# Patient Record
Sex: FEMALE | Race: BLACK OR AFRICAN AMERICAN | Employment: FULL TIME | URBAN - METROPOLITAN AREA
[De-identification: names, ages, dates, MRNs, and addresses within clinical notes are randomized per-mention and may not be internally consistent; named-entity substitution may affect disease eponyms.]

---

## 2017-10-11 ENCOUNTER — HOSPITAL ENCOUNTER (EMERGENCY)
Age: 57
Discharge: HOME | End: 2017-10-11
Attending: EMERGENCY MEDICINE
Payer: SELF-PAY

## 2017-10-11 VITALS
WEIGHT: 110 LBS | RESPIRATION RATE: 16 BRPM | BODY MASS INDEX: 20.24 KG/M2 | HEART RATE: 79 BPM | HEIGHT: 62 IN | OXYGEN SATURATION: 98 % | SYSTOLIC BLOOD PRESSURE: 119 MMHG | DIASTOLIC BLOOD PRESSURE: 71 MMHG | TEMPERATURE: 97.4 F

## 2017-10-11 DIAGNOSIS — S39.012A STRAIN OF LUMBAR REGION, INITIAL ENCOUNTER: Primary | ICD-10-CM

## 2017-10-11 DIAGNOSIS — M54.16 LUMBAR RADICULAR PAIN: ICD-10-CM

## 2017-10-11 LAB
APPEARANCE UR: CLEAR
BILIRUB UR QL: NEGATIVE
COLOR UR: YELLOW
EPITH CASTS URNS QL MICRO: NORMAL /LPF (ref 0–5)
GLUCOSE UR STRIP.AUTO-MCNC: NEGATIVE MG/DL
HGB UR QL STRIP: NEGATIVE
KETONES UR QL STRIP.AUTO: NEGATIVE MG/DL
LEUKOCYTE ESTERASE UR QL STRIP.AUTO: ABNORMAL
NITRITE UR QL STRIP.AUTO: NEGATIVE
PH UR STRIP: 6.5 [PH] (ref 5–8)
PROT UR STRIP-MCNC: NEGATIVE MG/DL
RBC #/AREA URNS HPF: NORMAL /HPF (ref 0–5)
SP GR UR REFRACTOMETRY: 1.02 (ref 1–1.03)
UROBILINOGEN UR QL STRIP.AUTO: 1 EU/DL (ref 0.2–1)
WBC URNS QL MICRO: NORMAL /HPF (ref 0–4)

## 2017-10-11 PROCEDURE — 99282 EMERGENCY DEPT VISIT SF MDM: CPT

## 2017-10-11 PROCEDURE — 81001 URINALYSIS AUTO W/SCOPE: CPT | Performed by: EMERGENCY MEDICINE

## 2017-10-11 RX ORDER — ACETAMINOPHEN 325 MG/1
650 TABLET ORAL
Qty: 20 TAB | Refills: 0 | Status: SHIPPED | OUTPATIENT
Start: 2017-10-11 | End: 2022-07-25

## 2017-10-11 RX ORDER — PREDNISONE 10 MG/1
TABLET ORAL
Qty: 1 PACKAGE | Refills: 0 | OUTPATIENT
Start: 2017-10-11 | End: 2019-11-16

## 2017-10-11 RX ORDER — CYCLOBENZAPRINE HCL 10 MG
10 TABLET ORAL
Qty: 15 TAB | Refills: 0 | OUTPATIENT
Start: 2017-10-11 | End: 2020-01-01

## 2017-10-11 NOTE — ED TRIAGE NOTES
Patient complains of lower back pain x 3 days. Patient denies any trauma. Patient believes it may be an UTI.

## 2017-10-11 NOTE — DISCHARGE INSTRUCTIONS
Back Strain: Care Instructions  Your Care Instructions    Back strain happens when you overstretch, or pull, a muscle in your back. You may hurt your back in an accident or when you exercise or lift something. Most back pain will get better with rest and time. You can take care of yourself at home to help your back heal.  Follow-up care is a key part of your treatment and safety. Be sure to make and go to all appointments, and call your doctor if you are having problems. It's also a good idea to know your test results and keep a list of the medicines you take. How can you care for yourself at home? · Try to stay as active as you can, but stop or reduce any activity that causes pain. · Put ice or a cold pack on the sore muscle for 10 to 20 minutes at a time to stop swelling. Try this every 1 to 2 hours for 3 days (when you are awake) or until the swelling goes down. Put a thin cloth between the ice pack and your skin. · After 2 or 3 days, apply a heating pad on low or a warm cloth to your back. Some doctors suggest that you go back and forth between hot and cold treatments. · Take pain medicines exactly as directed. ¨ If the doctor gave you a prescription medicine for pain, take it as prescribed. ¨ If you are not taking a prescription pain medicine, ask your doctor if you can take an over-the-counter medicine. · Try sleeping on your side with a pillow between your legs. Or put a pillow under your knees when you lie on your back. These measures can ease pain in your lower back. · Return to your usual level of activity slowly. When should you call for help? Call 911 anytime you think you may need emergency care. For example, call if:  · You are unable to move a leg at all. Call your doctor now or seek immediate medical care if:  · You have new or worse symptoms in your legs, belly, or buttocks. Symptoms may include:  ¨ Numbness or tingling. ¨ Weakness. ¨ Pain.   · You lose bladder or bowel control. Watch closely for changes in your health, and be sure to contact your doctor if you are not getting better as expected. Where can you learn more? Go to http://ej-damari.info/. Enter F309 in the search box to learn more about \"Back Strain: Care Instructions. \"  Current as of: March 21, 2017  Content Version: 11.3  © 7620-0963 [employer]. Care instructions adapted under license by [employer] (which disclaims liability or warranty for this information). If you have questions about a medical condition or this instruction, always ask your healthcare professional. [place] [address] any warranty or liability for your use of this information.

## 2017-10-11 NOTE — ED PROVIDER NOTES
HPI Comments: 5:19 PM Oskar Trevino is a 59 y.o. female who presents to the ED c/o lower back swelling and pain that began 3 days ago that radiates to hip and is worse on the right side. Patient states that she does a lot of walking at work. She denies dysuria, but notes a changed odor. She states that she has not had appetite changes. She is concerned about a UTI. The history is provided by the patient. No  was used. No past medical history on file. Past Surgical History:   Procedure Laterality Date    HX PARTIAL HYSTERECTOMY           No family history on file. Social History     Social History    Marital status: SINGLE     Spouse name: N/A    Number of children: N/A    Years of education: N/A     Occupational History    Not on file. Social History Main Topics    Smoking status: Current Every Day Smoker     Packs/day: 0.50    Smokeless tobacco: Not on file    Alcohol use No    Drug use: No    Sexual activity: Yes     Birth control/ protection: None     Other Topics Concern    Not on file     Social History Narrative    No narrative on file         ALLERGIES: Review of patient's allergies indicates no known allergies. Review of Systems   Constitutional: Negative for activity change, fatigue and fever. HENT: Negative for congestion and rhinorrhea. Eyes: Negative for visual disturbance. Respiratory: Negative for shortness of breath. Cardiovascular: Negative for chest pain and palpitations. Gastrointestinal: Negative for abdominal pain, diarrhea, nausea and vomiting. Genitourinary: Positive for flank pain. Negative for dysuria and hematuria. Musculoskeletal: Positive for back pain, joint swelling and myalgias. Skin: Negative for rash. Neurological: Negative for dizziness, weakness and light-headedness.        Vitals:    10/11/17 1705   BP: 119/71   Pulse: 79   Resp: 16   Temp: 97.4 °F (36.3 °C)   SpO2: 98%   Weight: 49.9 kg (110 lb) Height: 5' 2\" (1.575 m)            Physical Exam   Constitutional: She is oriented to person, place, and time. She appears well-developed and well-nourished. No distress. HENT:   Head: Normocephalic and atraumatic. Right Ear: External ear normal.   Left Ear: External ear normal.   Nose: Nose normal.   Mouth/Throat: Oropharynx is clear and moist.   Eyes: Conjunctivae and EOM are normal. Pupils are equal, round, and reactive to light. No scleral icterus. Neck: Normal range of motion. Neck supple. No JVD present. No tracheal deviation present. No thyromegaly present. Cardiovascular: Normal rate, regular rhythm, normal heart sounds and intact distal pulses. Exam reveals no gallop and no friction rub. No murmur heard. Pulmonary/Chest: Effort normal and breath sounds normal. She exhibits no tenderness. Abdominal: Soft. Bowel sounds are normal. She exhibits no distension. There is no tenderness. There is no rebound and no guarding. Mild R flank pain    Musculoskeletal: Normal range of motion. She exhibits tenderness. She exhibits no edema. R paraspinal tenderness, muscle hypertonicity, pain noted to the low back R with straight leg raising, no radicular findings   Lymphadenopathy:     She has no cervical adenopathy. Neurological: She is alert and oriented to person, place, and time. No cranial nerve deficit. Coordination normal.   No sensory loss, Gait normal, Motor 5/5   Skin: Skin is warm and dry. Psychiatric: She has a normal mood and affect. Her behavior is normal. Judgment and thought content normal.   Nursing note and vitals reviewed. MDM  Number of Diagnoses or Management Options  Diagnosis management comments: Pt is a 63yo female without significant medical hx presents with complaint of R sided low back pain with movement. Pt denies fever or chills but a change in urine smell. Suspect lumbar strain but will follow UA and reevaluate.   If negative, will proceed with supportive care for her low back pain. Jennifer William, DO 5:30 PM      ED Course       Procedures    Vitals:  Patient Vitals for the past 12 hrs:   Temp Pulse Resp BP SpO2   10/11/17 1705 97.4 °F (36.3 °C) 79 16 119/71 98 %       Medications ordered:   Medications - No data to display      Lab findings:  Recent Results (from the past 12 hour(s))   URINALYSIS W/ RFLX MICROSCOPIC    Collection Time: 10/11/17  5:07 PM   Result Value Ref Range    Color YELLOW      Appearance CLEAR      Specific gravity 1.018 1.005 - 1.030      pH (UA) 6.5 5.0 - 8.0      Protein NEGATIVE  NEG mg/dL    Glucose NEGATIVE  NEG mg/dL    Ketone NEGATIVE  NEG mg/dL    Bilirubin NEGATIVE  NEG      Blood NEGATIVE  NEG      Urobilinogen 1.0 0.2 - 1.0 EU/dL    Nitrites NEGATIVE  NEG      Leukocyte Esterase SMALL (A) NEG     URINE MICROSCOPIC ONLY    Collection Time: 10/11/17  5:07 PM   Result Value Ref Range    WBC 0 to 2 0 - 4 /hpf    RBC 0 to 2 0 - 5 /hpf    Epithelial cells FEW 0 - 5 /lpf       Reevaluation of patient:   I have reviewed discharge instructions with the patient. The patient verbalized understanding. Disposition:  Diagnosis:   1. Strain of lumbar region, initial encounter    2. Lumbar radicular pain        Disposition: Discharge    Follow-up Information     [facility] Details [facility] Call in 2 days ED visit follow-up [address], [address] [facility]. [address]    [facility] EMERGENCY DEPT Go to As needed, If symptoms worsen [address] [place] [id number]  [phone]           Patient's Medications   Start Taking    ACETAMINOPHEN (TYLENOL) 325 MG TABLET    Take 2 Tabs by mouth every four (4) hours as needed for Pain. CYCLOBENZAPRINE (FLEXERIL) 10 MG TABLET    Take 1 Tab by mouth three (3) times daily as needed for Muscle Spasm(s).     PREDNISONE (STERAPRED DS) 10 MG DOSE PACK    6 day dose pack per package instructions   Continue Taking    No medications on file   These Medications have changed    No medications on file   Stop Taking    No medications on file         Via Cheri Deanadebayo Michael acting as a scribe for and in the presence of Alex Mariano MD      October 11, 2017 at 5:21 PM       Provider Attestation:      I personally performed the services described in the documentation, reviewed the documentation, as recorded by the scribe in my presence, and it accurately and completely records my words and actions.  October 11, 2017 at 5:21 PM - Alex Mariano MD

## 2017-10-11 NOTE — ED NOTES
I have reviewed discharge instructions with the patient. The patient verbalized understanding.     Pt evaluated by provider

## 2018-06-26 ENCOUNTER — HOSPITAL ENCOUNTER (OUTPATIENT)
Dept: LAB | Age: 58
Discharge: HOME | End: 2018-06-26
Payer: SUBSIDIZED

## 2018-06-26 LAB
ALBUMIN SERPL-MCNC: 4 G/DL (ref 3.4–5)
ALBUMIN/GLOB SERPL: 1.1 {RATIO} (ref 0.8–1.7)
ALP SERPL-CCNC: 66 U/L (ref 45–117)
ALT SERPL-CCNC: 21 U/L (ref 13–56)
ANION GAP SERPL CALC-SCNC: 4 MMOL/L (ref 3–18)
AST SERPL-CCNC: 20 U/L (ref 15–37)
BASOPHILS # BLD: 0 K/UL (ref 0–0.06)
BASOPHILS NFR BLD: 0 % (ref 0–2)
BILIRUB SERPL-MCNC: 0.7 MG/DL (ref 0.2–1)
BUN SERPL-MCNC: 10 MG/DL (ref 7–18)
BUN/CREAT SERPL: 16 (ref 12–20)
CALCIUM SERPL-MCNC: 9.3 MG/DL (ref 8.5–10.1)
CHLORIDE SERPL-SCNC: 107 MMOL/L (ref 100–108)
CO2 SERPL-SCNC: 29 MMOL/L (ref 21–32)
CREAT SERPL-MCNC: 0.63 MG/DL (ref 0.6–1.3)
DIFFERENTIAL METHOD BLD: ABNORMAL
EOSINOPHIL # BLD: 0.1 K/UL (ref 0–0.4)
EOSINOPHIL NFR BLD: 1 % (ref 0–5)
ERYTHROCYTE [DISTWIDTH] IN BLOOD BY AUTOMATED COUNT: 12.4 % (ref 11.6–14.5)
GLOBULIN SER CALC-MCNC: 3.6 G/DL (ref 2–4)
GLUCOSE SERPL-MCNC: 86 MG/DL (ref 74–99)
HCT VFR BLD AUTO: 37.8 % (ref 35–45)
HGB BLD-MCNC: 12.9 G/DL (ref 12–16)
LYMPHOCYTES # BLD: 2 K/UL (ref 0.9–3.6)
LYMPHOCYTES NFR BLD: 22 % (ref 21–52)
MCH RBC QN AUTO: 30.9 PG (ref 24–34)
MCHC RBC AUTO-ENTMCNC: 34.1 G/DL (ref 31–37)
MCV RBC AUTO: 90.6 FL (ref 74–97)
MONOCYTES # BLD: 0.9 K/UL (ref 0.05–1.2)
MONOCYTES NFR BLD: 10 % (ref 3–10)
NEUTS SEG # BLD: 5.9 K/UL (ref 1.8–8)
NEUTS SEG NFR BLD: 67 % (ref 40–73)
PLATELET # BLD AUTO: 239 K/UL (ref 135–420)
PMV BLD AUTO: 10.7 FL (ref 9.2–11.8)
POTASSIUM SERPL-SCNC: 4.5 MMOL/L (ref 3.5–5.5)
PROT SERPL-MCNC: 7.6 G/DL (ref 6.4–8.2)
RBC # BLD AUTO: 4.17 M/UL (ref 4.2–5.3)
SODIUM SERPL-SCNC: 140 MMOL/L (ref 136–145)
WBC # BLD AUTO: 8.8 K/UL (ref 4.6–13.2)

## 2018-06-26 PROCEDURE — 85025 COMPLETE CBC W/AUTO DIFF WBC: CPT | Performed by: INTERNAL MEDICINE

## 2018-06-26 PROCEDURE — 80053 COMPREHEN METABOLIC PANEL: CPT | Performed by: INTERNAL MEDICINE

## 2018-06-26 PROCEDURE — 87902 NFCT AGT GNTYP ALYS HEP C: CPT | Performed by: INTERNAL MEDICINE

## 2018-06-26 PROCEDURE — 36415 COLL VENOUS BLD VENIPUNCTURE: CPT | Performed by: INTERNAL MEDICINE

## 2018-06-29 LAB
HCV GENTYP SERPL NAA+PROBE: NORMAL
HCV GENTYP SERPL NAA+PROBE: NORMAL
PLEASE NOTE, 550474: NORMAL
PLEASE NOTE, 550474: NORMAL

## 2018-08-14 ENCOUNTER — HOSPITAL ENCOUNTER (OUTPATIENT)
Dept: LAB | Age: 58
Discharge: HOME | End: 2018-08-14
Payer: SUBSIDIZED

## 2018-08-14 LAB
ALBUMIN SERPL-MCNC: 4 G/DL (ref 3.4–5)
ALBUMIN/GLOB SERPL: 1.1 {RATIO} (ref 0.8–1.7)
ALP SERPL-CCNC: 68 U/L (ref 45–117)
ALT SERPL-CCNC: 22 U/L (ref 13–56)
AMPHET UR QL SCN: NEGATIVE
ANION GAP SERPL CALC-SCNC: 4 MMOL/L (ref 3–18)
APTT PPP: 34.3 SEC (ref 23–36.4)
AST SERPL-CCNC: 23 U/L (ref 15–37)
BARBITURATES UR QL SCN: NEGATIVE
BASOPHILS # BLD: 0 K/UL (ref 0–0.1)
BASOPHILS NFR BLD: 0 % (ref 0–2)
BENZODIAZ UR QL: NEGATIVE
BILIRUB SERPL-MCNC: 0.4 MG/DL (ref 0.2–1)
BUN SERPL-MCNC: 9 MG/DL (ref 7–18)
BUN/CREAT SERPL: 16 (ref 12–20)
CALCIUM SERPL-MCNC: 9.5 MG/DL (ref 8.5–10.1)
CANNABINOIDS UR QL SCN: NEGATIVE
CHLORIDE SERPL-SCNC: 107 MMOL/L (ref 100–108)
CO2 SERPL-SCNC: 31 MMOL/L (ref 21–32)
COCAINE UR QL SCN: NEGATIVE
CREAT SERPL-MCNC: 0.57 MG/DL (ref 0.6–1.3)
DIFFERENTIAL METHOD BLD: ABNORMAL
EOSINOPHIL # BLD: 0.1 K/UL (ref 0–0.4)
EOSINOPHIL NFR BLD: 2 % (ref 0–5)
ERYTHROCYTE [DISTWIDTH] IN BLOOD BY AUTOMATED COUNT: 12.6 % (ref 11.6–14.5)
GLOBULIN SER CALC-MCNC: 3.6 G/DL (ref 2–4)
GLUCOSE SERPL-MCNC: 76 MG/DL (ref 74–99)
HAV IGM SER QL: NEGATIVE
HBV CORE IGM SER QL: NEGATIVE
HBV SURFACE AG SER QL: <0.1 INDEX
HBV SURFACE AG SER QL: NEGATIVE
HCT VFR BLD AUTO: 40.1 % (ref 35–45)
HCV AB SER IA-ACNC: >11 INDEX
HCV AB SERPL QL IA: POSITIVE
HCV COMMENT,HCGAC: ABNORMAL
HDSCOM,HDSCOM: NORMAL
HGB BLD-MCNC: 13.7 G/DL (ref 12–16)
INR PPP: 0.9 (ref 0.8–1.2)
LYMPHOCYTES # BLD: 2.3 K/UL (ref 0.9–3.6)
LYMPHOCYTES NFR BLD: 33 % (ref 21–52)
MCH RBC QN AUTO: 31.1 PG (ref 24–34)
MCHC RBC AUTO-ENTMCNC: 34.2 G/DL (ref 31–37)
MCV RBC AUTO: 91.1 FL (ref 74–97)
METHADONE UR QL: NEGATIVE
MONOCYTES # BLD: 0.8 K/UL (ref 0.05–1.2)
MONOCYTES NFR BLD: 12 % (ref 3–10)
NEUTS SEG # BLD: 3.6 K/UL (ref 1.8–8)
NEUTS SEG NFR BLD: 53 % (ref 40–73)
OPIATES UR QL: NEGATIVE
PCP UR QL: NEGATIVE
PLATELET # BLD AUTO: 254 K/UL (ref 135–420)
PMV BLD AUTO: 10.6 FL (ref 9.2–11.8)
POTASSIUM SERPL-SCNC: 4.7 MMOL/L (ref 3.5–5.5)
PROT SERPL-MCNC: 7.6 G/DL (ref 6.4–8.2)
PROTHROMBIN TIME: 12.3 SEC (ref 11.5–15.2)
RBC # BLD AUTO: 4.4 M/UL (ref 4.2–5.3)
SODIUM SERPL-SCNC: 142 MMOL/L (ref 136–145)
SP1: ABNORMAL
SP2: ABNORMAL
SP3: ABNORMAL
WBC # BLD AUTO: 6.8 K/UL (ref 4.6–13.2)

## 2018-08-14 PROCEDURE — 80307 DRUG TEST PRSMV CHEM ANLYZR: CPT | Performed by: INTERNAL MEDICINE

## 2018-08-14 PROCEDURE — 85025 COMPLETE CBC W/AUTO DIFF WBC: CPT | Performed by: INTERNAL MEDICINE

## 2018-08-14 PROCEDURE — 86704 HEP B CORE ANTIBODY TOTAL: CPT | Performed by: INTERNAL MEDICINE

## 2018-08-14 PROCEDURE — 80074 ACUTE HEPATITIS PANEL: CPT | Performed by: INTERNAL MEDICINE

## 2018-08-14 PROCEDURE — 87389 HIV-1 AG W/HIV-1&-2 AB AG IA: CPT | Performed by: INTERNAL MEDICINE

## 2018-08-14 PROCEDURE — 85730 THROMBOPLASTIN TIME PARTIAL: CPT | Performed by: INTERNAL MEDICINE

## 2018-08-14 PROCEDURE — 36415 COLL VENOUS BLD VENIPUNCTURE: CPT | Performed by: INTERNAL MEDICINE

## 2018-08-14 PROCEDURE — 87522 HEPATITIS C REVRS TRNSCRPJ: CPT | Performed by: INTERNAL MEDICINE

## 2018-08-14 PROCEDURE — 80053 COMPREHEN METABOLIC PANEL: CPT | Performed by: INTERNAL MEDICINE

## 2018-08-14 PROCEDURE — 85610 PROTHROMBIN TIME: CPT | Performed by: INTERNAL MEDICINE

## 2018-08-15 LAB
HBV CORE AB SERPL QL IA: POSITIVE
HIV 1+2 AB+HIV1 P24 AG SERPL QL IA: NONREACTIVE
HIV12 RESULT COMMENT, HHIVC: NORMAL

## 2018-08-17 LAB
HCV RNA SERPL NAA+PROBE-LOG IU: 6.8 HCV LOG 10IU/ML
HCV RNA SERPL PROBE AMP-ACNC: ABNORMAL HCVIU/ML

## 2019-03-14 ENCOUNTER — APPOINTMENT (OUTPATIENT)
Dept: GENERAL RADIOLOGY | Age: 59
End: 2019-03-14
Attending: PHYSICIAN ASSISTANT
Payer: SUBSIDIZED

## 2019-03-14 ENCOUNTER — HOSPITAL ENCOUNTER (EMERGENCY)
Age: 59
Discharge: HOME | End: 2019-03-14
Attending: EMERGENCY MEDICINE
Payer: SUBSIDIZED

## 2019-03-14 VITALS
SYSTOLIC BLOOD PRESSURE: 147 MMHG | HEART RATE: 88 BPM | RESPIRATION RATE: 16 BRPM | OXYGEN SATURATION: 99 % | DIASTOLIC BLOOD PRESSURE: 73 MMHG | TEMPERATURE: 98.6 F

## 2019-03-14 DIAGNOSIS — S16.1XXA STRAIN OF NECK MUSCLE, INITIAL ENCOUNTER: Primary | ICD-10-CM

## 2019-03-14 PROCEDURE — 74011636637 HC RX REV CODE- 636/637: Performed by: PHYSICIAN ASSISTANT

## 2019-03-14 PROCEDURE — 96372 THER/PROPH/DIAG INJ SC/IM: CPT

## 2019-03-14 PROCEDURE — 74011250636 HC RX REV CODE- 250/636: Performed by: PHYSICIAN ASSISTANT

## 2019-03-14 PROCEDURE — 99283 EMERGENCY DEPT VISIT LOW MDM: CPT

## 2019-03-14 PROCEDURE — 72050 X-RAY EXAM NECK SPINE 4/5VWS: CPT

## 2019-03-14 RX ORDER — CYCLOBENZAPRINE HCL 10 MG
10 TABLET ORAL
Qty: 12 TAB | Refills: 0 | Status: SHIPPED | OUTPATIENT
Start: 2019-03-14 | End: 2020-01-01

## 2019-03-14 RX ORDER — PREDNISONE 20 MG/1
60 TABLET ORAL
Status: COMPLETED | OUTPATIENT
Start: 2019-03-14 | End: 2019-03-14

## 2019-03-14 RX ORDER — KETOROLAC TROMETHAMINE 30 MG/ML
30 INJECTION, SOLUTION INTRAMUSCULAR; INTRAVENOUS
Status: COMPLETED | OUTPATIENT
Start: 2019-03-14 | End: 2019-03-14

## 2019-03-14 RX ADMIN — KETOROLAC TROMETHAMINE 30 MG: 30 INJECTION, SOLUTION INTRAMUSCULAR; INTRAVENOUS at 17:52

## 2019-03-14 RX ADMIN — PREDNISONE 60 MG: 20 TABLET ORAL at 17:51

## 2019-03-14 NOTE — ED TRIAGE NOTES
Pt injured neck by hitting pipe while wearing hard hat, jerking neck on Tuesday. Tylenol at home has not helped.

## 2019-03-14 NOTE — ED PROVIDER NOTES
EMERGENCY DEPARTMENT HISTORY AND PHYSICAL EXAM    6:20 PM      Date: 3/14/2019  Patient Name: Nicanor Ardon    History of Presenting Illness     Chief Complaint   Patient presents with    Neck Pain         History Provided By: Patient    Chief Complaint: Neck pain  Duration: 2 Days  Timing:  Worsening  Location: Diffuse  Quality: Not obtained  Severity: Moderate  Modifying Factors: Tylenol did not provide pain relief. Associated Symptoms: denies any other associated signs or symptoms      Additional History (Context): 6:25 PM Nicanor Ardon is a 59 y.o. female with no pertinent PMHx who presents to ED complaining of worsening, moderate, diffuse neck pain onset 2 days. The patient reports hitting her head at work on Tuesday. She states that she struck a pipe while wearing a hardhat, which caused her neck to jerk backwards. The patient notes that the pain did not start instantly, but began later that evening. She states that she also may have slept in an unusual position. The patient claims that she took two Tylenol, which did not provide relief. She reports that she is unable to turn her neck, as it worsens the pain. No other concerns or symptoms at this time. PCP: Sandi Kat MD      Current Outpatient Medications   Medication Sig Dispense Refill    cyclobenzaprine (FLEXERIL) 10 mg tablet Take 1 Tab by mouth three (3) times daily as needed for Muscle Spasm(s). 12 Tab 0    predniSONE (STERAPRED DS) 10 mg dose pack 6 day dose pack per package instructions 1 Package 0    cyclobenzaprine (FLEXERIL) 10 mg tablet Take 1 Tab by mouth three (3) times daily as needed for Muscle Spasm(s). 15 Tab 0    acetaminophen (TYLENOL) 325 mg tablet Take 2 Tabs by mouth every four (4) hours as needed for Pain. 20 Tab 0       Past History     Past Medical History:  No past medical history on file.     Past Surgical History:  Past Surgical History:   Procedure Laterality Date    HX PARTIAL HYSTERECTOMY Family History:  No family history on file. Social History:  Social History     Tobacco Use    Smoking status: Current Every Day Smoker     Packs/day: 0.50   Substance Use Topics    Alcohol use: No    Drug use: No       Allergies:  No Known Allergies      Review of Systems       Review of Systems   Constitutional: Negative for chills and fever. HENT: Negative for congestion, rhinorrhea and sore throat. Eyes: Negative for visual disturbance. Respiratory: Negative for cough and shortness of breath. Cardiovascular: Negative for chest pain and palpitations. Gastrointestinal: Negative for abdominal pain, nausea and vomiting. Musculoskeletal: Positive for neck pain. Negative for back pain and myalgias. Skin: Negative. Allergic/Immunologic: Negative. Neurological: Negative for headaches. All other systems reviewed and are negative. Physical Exam     Visit Vitals  /73 (BP 1 Location: Left arm, BP Patient Position: At rest)   Pulse 88   Temp 98.6 °F (37 °C)   Resp 16   SpO2 99%     Physical Exam   Constitutional: She is oriented to person, place, and time. She appears well-developed and well-nourished. No distress. HENT:   Head: Normocephalic and atraumatic. Eyes: Conjunctivae are normal. Right eye exhibits no discharge. Left eye exhibits no discharge. Neck: Normal range of motion. Neck supple. No thyromegaly present. Cardiovascular: Normal rate, regular rhythm and normal heart sounds. Pulmonary/Chest: Effort normal and breath sounds normal. No respiratory distress. She has no wheezes. She has no rales. She exhibits no tenderness. Musculoskeletal: She exhibits tenderness. She exhibits no edema or deformity. TTP noted to bilateral cervical paraspinal muscles.   No obvious deformity, step-off deformity, midline spinal tenderness, edema, ecchymosis, erythema, or overlying skin changes noted on exam.  Pt is ambulatory, moving BUE with 5/5 strength and FROM against resistance in flexion and extension. Pt is neurovascularly intact distally with cap refill < 3 seconds and intact sensation. Lymphadenopathy:     She has no cervical adenopathy. Neurological: She is alert and oriented to person, place, and time. She has normal reflexes. No cranial nerve deficit. Skin: Skin is warm and dry. She is not diaphoretic. Psychiatric: She has a normal mood and affect. Nursing note and vitals reviewed. Diagnostic Study Results     Labs -  No results found for this or any previous visit (from the past 12 hour(s)). Radiologic Studies -   Xr Spine Cerv 4 Or 5 V    Result Date: 3/14/2019  EXAM: Cervical spine x-ray INDICATION: Neck pain TECHNIQUE: AP, lateral, bilateral oblique and odontoid views of the cervical spine. COMPARISON: None. FINDINGS: The bone density is grossly unremarkable. The cervical spine from the craniocervical junction through T2 is well visualized on the lateral view. The facets are appropriately aligned. No prevertebral edema appreciated. The lateral masses of C1 sit appropriately on C2. No evidence for an acute fracture or dislocation. Multilevel degenerative disc disease is noted. This most pronounced at C5-C6 and C6-C7 where there is moderate disc space height loss with endplate cirrhosis and osteophyte formation. IMPRESSION: 1. Moderate multilevel degenerative disc disease most pronounced at C5-C6 and C6-C7. Medical Decision Making   I am the first provider for this patient. I reviewed the vital signs, available nursing notes, past medical history, past surgical history, family history and social history. Vital Signs-Reviewed the patient's vital signs.     Pulse Oximetry Analysis -  99% on room air (Interpretation) WNL    Cardiac Monitor:  Rate: 88  Rhythm:  Normal Sinus Rhythm     Records Reviewed: Nursing Notes (Time of Review: 6:20 PM)    ED Course: Progress Notes, Reevaluation, and Consults:      Provider Notes (Medical Decision Making):   Differential Diagnosis: Musculoskeletal pain, myofascial strain/sprain, muscle spasm, spondylolisthesis, spondylosis, torticollis, DJD, OA    Plan:  Pt presents ambulatory, moving BUE in NAD, well-hydrated, non-toxic in appearance, with normal vitals. No red flags such as weakness, breathing difficulty, expanding hematomas, neck swelling, crepitance, IVDA, or fever. XR without acute process, pending radiology. Exam and HPI consistent with cervical strain. Will trial outpatient flexeril. Explained to patient that this pain may take a few weeks to completely resolve. Will provide work note as necessary. At this time, patient is stable and appropriate for discharge home. Patient demonstrates understanding of current diagnoses and is in agreement with the treatment plan. They are advised that while the likelihood of serious underlying condition is low at this point given the evaluation performed today, we cannot fully rule it out. They are advised to immediately return with any new symptoms or worsening of current condition. All questions have been answered. Patient is given educational material regarding their diagnoses, including danger symptoms and when to return to the ED. Diagnosis     Clinical Impression:   1. Strain of neck muscle, initial encounter        Disposition: DC Home    Follow-up Information     Follow up With Specialties Details Why Contact Info    Evetta Sacks, MD Internal Medicine Call in 2 days As needed, If symptoms worsen [address] [place] [facility] EMERGENCY DEPT Emergency Medicine Go to As needed, If symptoms worsen [address] [address]  [phone]              Medication List      CHANGE how you take these medications    * cyclobenzaprine 10 mg tablet  Commonly known as:  FLEXERIL  Take 1 Tab by mouth three (3) times daily as needed for Muscle Spasm(s).   What changed:  Another medication with the same name was added. Make sure you understand how and when to take each. * cyclobenzaprine 10 mg tablet  Commonly known as:  FLEXERIL  Take 1 Tab by mouth three (3) times daily as needed for Muscle Spasm(s). What changed: You were already taking a medication with the same name, and this prescription was added. Make sure you understand how and when to take each. * This list has 2 medication(s) that are the same as other medications prescribed for you. Read the directions carefully, and ask your doctor or other care provider to review them with you. CONTINUE taking these medications    acetaminophen 325 mg tablet  Commonly known as:  TYLENOL  Take 2 Tabs by mouth every four (4) hours as needed for Pain. predniSONE 10 mg dose pack  Commonly known as:  STERAPRED DS  6 day dose pack per package instructions           Where to Get Your Medications      Information about where to get these medications is not yet available    Ask your nurse or doctor about these medications  · cyclobenzaprine 10 mg tablet       _______________________________    Samira Gates acting as a scribe for and in the presence of Raza Roche     March 14, 2019 at 6:22 PM       Provider Attestation:      I personally performed the services described in the documentation, reviewed the documentation, as recorded by the scribe in my presence, and it accurately and completely records my words and actions.  March 14, 2019 at 6:22 PM - Sunny Baptiste PA-C

## 2019-03-14 NOTE — LETTER
NOTIFICATION OF RETURN TO WORK 
 
3/14/2019 6:44 PM 
 
Ms. Consuelo Melendez [address] [facility] [address] [place] To Whom It May Concern: 
 
Consuelo Melendez was under the care of [facility] EMERGENCY DEPT. She will be able to return to work on Monday, 3/18/19. If there are questions or concerns please have the patient contact our office. Sincerely, Velvet Vasquez PA-C

## 2019-03-14 NOTE — DISCHARGE INSTRUCTIONS
Patient Education      Please return immediately to the Emergency Room for re-evaluation if you are not improving, develop any new symptoms, or develop worsening of current symptoms! If you have been prescribed a medication and are unable to take this medication for any reason, please return to the Emergency Department for further evaluation! If you have been referred for follow-up to a specialist, but are unable to follow-up and your symptoms are either not improving or are worsening, please return to the Emergency Department for further evaluation! Neck Strain or Sprain: Rehab Exercises  Your Care Instructions  Here are some examples of typical rehabilitation exercises for your condition. Start each exercise slowly. Ease off the exercise if you start to have pain. Your doctor or physical therapist will tell you when you can start these exercises and which ones will work best for you. How to do the exercises  Neck rotation    1. Sit in a firm chair, or stand up straight. 2. Keeping your chin level, turn your head to the right, and hold for 15 to 30 seconds. 3. Turn your head to the left and hold for 15 to 30 seconds. 4. Repeat 2 to 4 times to each side. Neck stretches    1. Look straight ahead, and tip your right ear to your right shoulder. Do not let your left shoulder rise up as you tip your head to the right. 2. Hold for 15 to 30 seconds. 3. Tilt your head to the left. Do not let your right shoulder rise up as you tip your head to the left. 4. Hold for 15 to 30 seconds. 5. Repeat 2 to 4 times to each side. Forward neck flexion    1. Sit in a firm chair, or stand up straight. 2. Bend your head forward. 3. Hold for 15 to 30 seconds. 4. Repeat 2 to 4 times. Lateral (side) bend strengthening    1. With your right hand, place your first two fingers on your right temple.   2. Start to bend your head to the side while using gentle pressure from your fingers to keep your head from bending. 3. Hold for about 6 seconds. 4. Repeat 8 to 12 times. 5. Switch hands and repeat the same exercise on your left side. Forward bend strengthening    1. Place your first two fingers of either hand on your forehead. 2. Start to bend your head forward while using gentle pressure from your fingers to keep your head from bending. 3. Hold for about 6 seconds. 4. Repeat 8 to 12 times. Neutral position strengthening    1. Using one hand, place your fingertips on the back of your head at the top of your neck. 2. Start to bend your head backward while using gentle pressure from your fingers to keep your head from bending. 3. Hold for about 6 seconds. 4. Repeat 8 to 12 times. Chin tuck    1. Lie on the floor with a rolled-up towel under your neck. Your head should be touching the floor. 2. Slowly bring your chin toward your chest.  3. Hold for a count of 6, and then relax for up to 10 seconds. 4. Repeat 8 to 12 times. Follow-up care is a key part of your treatment and safety. Be sure to make and go to all appointments, and call your doctor if you are having problems. It's also a good idea to know your test results and keep a list of the medicines you take. Where can you learn more? Go to http://ej-damari.info/. Enter M679 in the search box to learn more about \"Neck Strain or Sprain: Rehab Exercises. \"  Current as of: September 20, 2018  Content Version: 11.9  © 3561-8928 [employer]. Care instructions adapted under license by [employer] (which disclaims liability or warranty for this information). If you have questions about a medical condition or this instruction, always ask your healthcare professional. [address] any warranty or liability for your use of this information.

## 2019-03-14 NOTE — ED NOTES
Pt discharged to home, discharge paperwork, follow up information, and prescriptions given to pt and all questions answered.

## 2019-03-14 NOTE — ED NOTES
I performed a brief evaluation, including history and physical, of the patient here in triage and I have determined that pt will need further treatment and evaluation from the main side ER physician. I have placed initial orders to help in expediting patients care. March 14, 2019 at 5:30 PM - GREER Ernandez        Visit Vitals  /73 (BP 1 Location: Left arm, BP Patient Position: At rest)   Pulse 88   Temp 98.6 °F (37 °C)   Resp 16   SpO2 99%        Patient reports neck pain with motion, reports neck injury at work and concern she may have slept on her neck wrong. Patient denies CT of c-spine and c-collar.

## 2019-11-16 ENCOUNTER — HOSPITAL ENCOUNTER (EMERGENCY)
Age: 59
Discharge: HOME | End: 2019-11-16
Attending: EMERGENCY MEDICINE
Payer: SELF-PAY

## 2019-11-16 VITALS
TEMPERATURE: 98.6 F | RESPIRATION RATE: 14 BRPM | SYSTOLIC BLOOD PRESSURE: 111 MMHG | HEART RATE: 76 BPM | DIASTOLIC BLOOD PRESSURE: 68 MMHG | OXYGEN SATURATION: 100 %

## 2019-11-16 DIAGNOSIS — M54.41 ACUTE RIGHT-SIDED LOW BACK PAIN WITH RIGHT-SIDED SCIATICA: Primary | ICD-10-CM

## 2019-11-16 PROCEDURE — 99283 EMERGENCY DEPT VISIT LOW MDM: CPT

## 2019-11-16 PROCEDURE — 74011636637 HC RX REV CODE- 636/637: Performed by: EMERGENCY MEDICINE

## 2019-11-16 RX ORDER — NAPROXEN 500 MG/1
500 TABLET ORAL 2 TIMES DAILY WITH MEALS
Qty: 20 TAB | Refills: 0 | Status: SHIPPED | OUTPATIENT
Start: 2019-11-16 | End: 2019-11-26

## 2019-11-16 RX ORDER — PREDNISONE 50 MG/1
50 TABLET ORAL DAILY
Qty: 3 TAB | Refills: 0 | Status: SHIPPED | OUTPATIENT
Start: 2019-11-16 | End: 2019-11-19

## 2019-11-16 RX ORDER — PREDNISONE 20 MG/1
60 TABLET ORAL
Status: COMPLETED | OUTPATIENT
Start: 2019-11-16 | End: 2019-11-16

## 2019-11-16 RX ADMIN — PREDNISONE 60 MG: 20 TABLET ORAL at 13:22

## 2019-11-16 NOTE — ED PROVIDER NOTES
EMERGENCY DEPARTMENT HISTORY AND PHYSICAL EXAM  This was created with voice recognition software and transcription errors may be present. 12:47 PM  Date: 11/16/2019  Patient Name: Kanu James    History of Presenting Illness     Chief Complaint:    History Provided By:     HPI: Kanu James is a 62 y.o. female past medical history of partial hysterectomy as well as history of IV drug use 25 years ago presents with right lower back pain radiating down her leg. She is denies injury denies any current IV drug use in the recent 25 years. No numbness no tingling no fevers no bowel or bladder incontinence. She had low back pain in the past but no other radiation down her leg. Pain is worse with movement better with rest.    PCP: Anitha Still MD      Past History     Past Medical History:  No past medical history on file. Past Surgical History:  Past Surgical History:   Procedure Laterality Date    HX PARTIAL HYSTERECTOMY         Family History:  No family history on file. Social History:  Social History     Tobacco Use    Smoking status: Current Every Day Smoker     Packs/day: 0.50   Substance Use Topics    Alcohol use: No    Drug use: No       Allergies:  No Known Allergies    Review of Systems     Review of Systems   All other systems reviewed and are negative. 10 point review of systems otherwise negative unless noted in HPI. Physical Exam       Physical Exam   Constitutional: She is oriented to person, place, and time. She appears well-developed. HENT:   Head: Normocephalic and atraumatic. Eyes: Pupils are equal, round, and reactive to light. EOM are normal.   Neck: Normal range of motion. Neck supple. Cardiovascular: Normal rate, regular rhythm and normal heart sounds. Exam reveals no friction rub. No murmur heard. Pulmonary/Chest: Effort normal and breath sounds normal. No respiratory distress. She has no wheezes. Abdominal: Soft. She exhibits no distension.  There is no tenderness. There is no rebound and no guarding. Musculoskeletal: Normal range of motion. Right paraspinous back pain with radiation down the right leg   Neurological: She is alert and oriented to person, place, and time. Skin: Skin is warm and dry. Psychiatric: She has a normal mood and affect. Her behavior is normal. Thought content normal.       Diagnostic Study Results     Vital Signs   Visit Vitals  /68   Pulse 76   Temp 98.6 °F (37 °C)   Resp 14   SpO2 100%      EKG:  Labs:   Imaging:     Medical Decision Making     ED Course: Progress Notes, Reevaluation, and Consults:      Provider Notes (Medical Decision Making): Patient with nontraumatic right low back pain with sciatica will treat with steroids & Naprosyn         Diagnosis     Clinical Impression: No diagnosis found. Disposition:    Patient's Medications   Start Taking    No medications on file   Continue Taking    ACETAMINOPHEN (TYLENOL) 325 MG TABLET    Take 2 Tabs by mouth every four (4) hours as needed for Pain. CYCLOBENZAPRINE (FLEXERIL) 10 MG TABLET    Take 1 Tab by mouth three (3) times daily as needed for Muscle Spasm(s). CYCLOBENZAPRINE (FLEXERIL) 10 MG TABLET    Take 1 Tab by mouth three (3) times daily as needed for Muscle Spasm(s).     PREDNISONE (STERAPRED DS) 10 MG DOSE PACK    6 day dose pack per package instructions   These Medications have changed    No medications on file   Stop Taking    No medications on file

## 2019-11-16 NOTE — ED TRIAGE NOTES
Patient arrived via triage complaining of lower back pain that radiates to her hips. Patient states that this has been going on for 3 days and today the pain was unbearable.

## 2019-11-16 NOTE — DISCHARGE INSTRUCTIONS
Back Pain: Care Instructions  Your Care Instructions    Back pain has many possible causes. It is often related to problems with muscles and ligaments of the back. It may also be related to problems with the nerves, discs, or bones of the back. Moving, lifting, standing, sitting, or sleeping in an awkward way can strain the back. Sometimes you don't notice the injury until later. Arthritis is another common cause of back pain. Although it may hurt a lot, back pain usually improves on its own within several weeks. Most people recover in 12 weeks or less. Using good home treatment and being careful not to stress your back can help you feel better sooner. Follow-up care is a key part of your treatment and safety. Be sure to make and go to all appointments, and call your doctor if you are having problems. It's also a good idea to know your test results and keep a list of the medicines you take. How can you care for yourself at home? · Sit or lie in positions that are most comfortable and reduce your pain. Try one of these positions when you lie down:  ? Lie on your back with your knees bent and supported by large pillows. ? Lie on the floor with your legs on the seat of a sofa or chair. ? Lie on your side with your knees and hips bent and a pillow between your legs. ? Lie on your stomach if it does not make pain worse. · Do not sit up in bed, and avoid soft couches and twisted positions. Bed rest can help relieve pain at first, but it delays healing. Avoid bed rest after the first day of back pain. · Change positions every 30 minutes. If you must sit for long periods of time, take breaks from sitting. Get up and walk around, or lie in a comfortable position. · Try using a heating pad on a low or medium setting for 15 to 20 minutes every 2 or 3 hours. Try a warm shower in place of one session with the heating pad. · You can also try an ice pack for 10 to 15 minutes every 2 to 3 hours.  Put a thin cloth between the ice pack and your skin. · Take pain medicines exactly as directed. ? If the doctor gave you a prescription medicine for pain, take it as prescribed. ? If you are not taking a prescription pain medicine, ask your doctor if you can take an over-the-counter medicine. · Take short walks several times a day. You can start with 5 to 10 minutes, 3 or 4 times a day, and work up to longer walks. Walk on level surfaces and avoid hills and stairs until your back is better. · Return to work and other activities as soon as you can. Continued rest without activity is usually not good for your back. · To prevent future back pain, do exercises to stretch and strengthen your back and stomach. Learn how to use good posture, safe lifting techniques, and proper body mechanics. When should you call for help? Call your doctor now or seek immediate medical care if:    · You have new or worsening numbness in your legs.     · You have new or worsening weakness in your legs. (This could make it hard to stand up.)     · You lose control of your bladder or bowels.    Watch closely for changes in your health, and be sure to contact your doctor if:    · You have a fever, lose weight, or don't feel well.     · You do not get better as expected. Where can you learn more? Go to http://ej-damari.info/. Enter V310 in the search box to learn more about \"Back Pain: Care Instructions. \"  Current as of: June 26, 2019  Content Version: 12.2  © 4384-4197 [employer]. Care instructions adapted under license by [employer] (which disclaims liability or warranty for this information). If you have questions about a medical condition or this instruction, always ask your healthcare professional. [place] [address] any warranty or liability for your use of this information.     Patient Education        Back Pain: Care Instructions  Your Care Instructions    Back pain has many possible causes. It is often related to problems with muscles and ligaments of the back. It may also be related to problems with the nerves, discs, or bones of the back. Moving, lifting, standing, sitting, or sleeping in an awkward way can strain the back. Sometimes you don't notice the injury until later. Arthritis is another common cause of back pain. Although it may hurt a lot, back pain usually improves on its own within several weeks. Most people recover in 12 weeks or less. Using good home treatment and being careful not to stress your back can help you feel better sooner. Follow-up care is a key part of your treatment and safety. Be sure to make and go to all appointments, and call your doctor if you are having problems. It's also a good idea to know your test results and keep a list of the medicines you take. How can you care for yourself at home? · Sit or lie in positions that are most comfortable and reduce your pain. Try one of these positions when you lie down:  ? Lie on your back with your knees bent and supported by large pillows. ? Lie on the floor with your legs on the seat of a sofa or chair. ? Lie on your side with your knees and hips bent and a pillow between your legs. ? Lie on your stomach if it does not make pain worse. · Do not sit up in bed, and avoid soft couches and twisted positions. Bed rest can help relieve pain at first, but it delays healing. Avoid bed rest after the first day of back pain. · Change positions every 30 minutes. If you must sit for long periods of time, take breaks from sitting. Get up and walk around, or lie in a comfortable position. · Try using a heating pad on a low or medium setting for 15 to 20 minutes every 2 or 3 hours. Try a warm shower in place of one session with the heating pad. · You can also try an ice pack for 10 to 15 minutes every 2 to 3 hours. Put a thin cloth between the ice pack and your skin.   · Take pain medicines exactly as directed. ? If the doctor gave you a prescription medicine for pain, take it as prescribed. ? If you are not taking a prescription pain medicine, ask your doctor if you can take an over-the-counter medicine. · Take short walks several times a day. You can start with 5 to 10 minutes, 3 or 4 times a day, and work up to longer walks. Walk on level surfaces and avoid hills and stairs until your back is better. · Return to work and other activities as soon as you can. Continued rest without activity is usually not good for your back. · To prevent future back pain, do exercises to stretch and strengthen your back and stomach. Learn how to use good posture, safe lifting techniques, and proper body mechanics. When should you call for help? Call your doctor now or seek immediate medical care if:    · You have new or worsening numbness in your legs.     · You have new or worsening weakness in your legs. (This could make it hard to stand up.)     · You lose control of your bladder or bowels.    Watch closely for changes in your health, and be sure to contact your doctor if:    · You have a fever, lose weight, or don't feel well.     · You do not get better as expected. Where can you learn more? Go to http://ej-damari.info/. Enter R065 in the search box to learn more about \"Back Pain: Care Instructions. \"  Current as of: June 26, 2019  Content Version: 12.2  © 3767-9661 [employer]. Care instructions adapted under license by [employer] (which disclaims liability or warranty for this information). If you have questions about a medical condition or this instruction, always ask your healthcare professional. [address] any warranty or liability for your use of this information.

## 2020-01-01 ENCOUNTER — HOSPITAL ENCOUNTER (EMERGENCY)
Age: 60
Discharge: HOME | End: 2020-01-01
Attending: EMERGENCY MEDICINE
Payer: SELF-PAY

## 2020-01-01 ENCOUNTER — APPOINTMENT (OUTPATIENT)
Dept: GENERAL RADIOLOGY | Age: 60
End: 2020-01-01
Attending: PHYSICIAN ASSISTANT
Payer: SELF-PAY

## 2020-01-01 VITALS
TEMPERATURE: 97.8 F | HEART RATE: 80 BPM | SYSTOLIC BLOOD PRESSURE: 104 MMHG | RESPIRATION RATE: 16 BRPM | OXYGEN SATURATION: 100 % | DIASTOLIC BLOOD PRESSURE: 65 MMHG

## 2020-01-01 DIAGNOSIS — M54.2 ACUTE NECK PAIN: Primary | ICD-10-CM

## 2020-01-01 PROCEDURE — 99283 EMERGENCY DEPT VISIT LOW MDM: CPT

## 2020-01-01 PROCEDURE — 73000 X-RAY EXAM OF COLLAR BONE: CPT

## 2020-01-01 PROCEDURE — 74011250637 HC RX REV CODE- 250/637: Performed by: PHYSICIAN ASSISTANT

## 2020-01-01 RX ORDER — IBUPROFEN 600 MG/1
600 TABLET ORAL
Qty: 30 TAB | Refills: 0 | Status: SHIPPED | OUTPATIENT
Start: 2020-01-01 | End: 2022-07-25

## 2020-01-01 RX ORDER — IBUPROFEN 600 MG/1
600 TABLET ORAL
Status: COMPLETED | OUTPATIENT
Start: 2020-01-01 | End: 2020-01-01

## 2020-01-01 RX ORDER — CYCLOBENZAPRINE HCL 10 MG
10 TABLET ORAL
Qty: 15 TAB | Refills: 0 | Status: SHIPPED | OUTPATIENT
Start: 2020-01-01 | End: 2022-07-25

## 2020-01-01 RX ADMIN — IBUPROFEN 600 MG: 600 TABLET ORAL at 12:38

## 2020-01-01 NOTE — ED PROVIDER NOTES
EMERGENCY DEPARTMENT HISTORY AND PHYSICAL EXAM    Date: 1/1/2020  Patient Name: Lillian Kanner    History of Presenting Illness     Chief Complaint   Patient presents with    Neck Pain       HPI: Lillian Kanner, 62 y.o. female presents to the ED for Rt sided neck pain and stiffness starting yesterday. She states she feels like the muscles in the Rt side of her neck are very tight. She also c/o swelling and pain to her Rt collarbone. She denies any fall or trauma. She has not taken anything for pain. No HA, numbness, tingling, arm weakness, swelling or numbness. There are no other complaints, changes, or physical findings at this time. Past History     Past Medical History:  No past medical history on file. Past Surgical History:  Past Surgical History:   Procedure Laterality Date    HX PARTIAL HYSTERECTOMY         Family History:  No family history on file. Social History:  Social History     Tobacco Use    Smoking status: Current Every Day Smoker     Packs/day: 0.50   Substance Use Topics    Alcohol use: No    Drug use: No       Allergies:  No Known Allergies      Review of Systems   Constitutional: Negative for chills and fever. HENT: Negative. Eyes: Negative for visual disturbance. Respiratory: Negative for cough and shortness of breath. Cardiovascular: Negative for chest pain and palpitations. Gastrointestinal: Negative for abdominal pain, nausea and vomiting. Musculoskeletal: Positive for arthralgias, neck pain and neck stiffness. Negative for back pain and myalgias. Skin: Negative. Allergic/Immunologic: Negative. Neurological: Negative for dizziness, weakness, light-headedness and headaches. Psychiatric/Behavioral: Negative. Physical Exam  Vitals signs and nursing note reviewed. Constitutional:       General: She is not in acute distress. Appearance: Normal appearance. She is well-developed. She is not toxic-appearing.    HENT:      Head: Normocephalic and atraumatic. Right Ear: External ear normal.      Left Ear: External ear normal.      Nose: Nose normal.   Eyes:      General: Lids are normal. No scleral icterus. Conjunctiva/sclera: Conjunctivae normal.   Neck:      Musculoskeletal: Normal range of motion and neck supple. Pain with movement and muscular tenderness present. No edema, neck rigidity or spinous process tenderness. Cardiovascular:      Rate and Rhythm: Normal rate and regular rhythm. Heart sounds: Normal heart sounds. Pulmonary:      Effort: Pulmonary effort is normal. No respiratory distress. Breath sounds: Normal breath sounds. Musculoskeletal: Normal range of motion. Right shoulder: She exhibits normal range of motion. Arms:       Comments: Rt clavicle with distal and proximal tenderness  Proximal swelling to Rt clavicle   Skin:     General: Skin is warm. Findings: No rash. Neurological:      Mental Status: She is alert and oriented to person, place, and time. Motor: No abnormal muscle tone. Psychiatric:         Speech: Speech normal.         Behavior: Behavior normal. Behavior is cooperative. Thought Content: Thought content normal.         Judgment: Judgment normal.          Diagnostic Study Results     Labs -   No results found for this or any previous visit (from the past 12 hour(s)). Radiologic Studies -   XR CLAVICLE RT    (Results Pending)   reviewed, no acute findings  CT Results  (Last 48 hours)    None        CXR Results  (Last 48 hours)    None          Vital Signs-Reviewed the patient's vital signs. Patient Vitals for the past 12 hrs:   Temp Pulse Resp BP SpO2   01/01/20 1132 97.8 °F (36.6 °C) 80 16 104/65 100 %       I reviewed the vital signs, available nursing notes, past medical history, past surgical history, family history and social history.     Provider Notes (Medical Decision Making):   Rt sided neck pain and stiffness, atraumatic., tenderness/stiffness to Rt posterior-lateral aspect of neck. Likely musculoskeletal.  Rt clavicular tenderness and pain, check Xray for fx. Diagnosis     Clinical Impression:   1. Acute neck pain        Disposition:  Home    PLAN:  1. Current Discharge Medication List      START taking these medications    Details   ibuprofen (MOTRIN) 600 mg tablet Take 1 Tab by mouth every six (6) hours as needed for Pain. Qty: 30 Tab, Refills: 0         CONTINUE these medications which have CHANGED    Details   cyclobenzaprine (FLEXERIL) 10 mg tablet Take 1 Tab by mouth three (3) times daily as needed for Muscle Spasm(s). Qty: 15 Tab, Refills: 0           2. Follow-up Information     Follow up With Specialties Details Why [address] [facility] EMERGENCY DEPT Emergency Medicine  If symptoms worsen, As needed [address] [place] [id number]  [phone]    [facility]  Schedule an appointment as soon as possible for a visit in 2 days If symptoms worsen, for re-evaluation, As needed [address]  [phone]        3. Return to ED if worse   The patient will be discharged home. Warning signs of worsening condition were discussed and the patient verbalized understanding. Based on patient's age, coexisting illness, exam, and the results of this ED evaluation, the decision to treat as an outpatient was made. While it is impossible to completely exclude the possibility of underlying serious disease or worsening of condition, I feel the relative likelihood is extremely low. I discussed this uncertainty with the patient, who understood ED evaluation and treatment and felt comfortable with the outpatient treatment plan. All questions regarding care, test results, and follow up were answered. The patient is stable and appropriate to discharge. Patient understands importance to return to the emergency department for any new or worsening symptoms.  I stressed the importance of follow up for repeat assessment and possibly further evaluation/treatment.       Tamra Trevino PA-C

## 2020-01-01 NOTE — DISCHARGE INSTRUCTIONS
Patient Education      Apply warm heat to neck for 10-20 minutest at a time. Rest, avoid heavy lifting  Return as needed or if symptoms worsen  Neck Pain: Care Instructions  Your Care Instructions    You can have neck pain anywhere from the bottom of your head to the top of your shoulders. It can spread to the upper back or arms. Injuries, painting a ceiling, sleeping with your neck twisted, staying in one position for too long, and many other activities can cause neck pain. Most neck pain gets better with home care. Your doctor may recommend medicine to relieve pain or relax your muscles. He or she may suggest exercise and physical therapy to increase flexibility and relieve stress. You may need to wear a special (cervical) collar to support your neck for a day or two. Follow-up care is a key part of your treatment and safety. Be sure to make and go to all appointments, and call your doctor if you are having problems. It's also a good idea to know your test results and keep a list of the medicines you take. How can you care for yourself at home? · Try using a heating pad on a low or medium setting for 15 to 20 minutes every 2 or 3 hours. Try a warm shower in place of one session with the heating pad. · You can also try an ice pack for 10 to 15 minutes every 2 to 3 hours. Put a thin cloth between the ice and your skin. · Take pain medicines exactly as directed. ¨ If the doctor gave you a prescription medicine for pain, take it as prescribed. ¨ If you are not taking a prescription pain medicine, ask your doctor if you can take an over-the-counter medicine. · If your doctor recommends a cervical collar, wear it exactly as directed. When should you call for help? Call your doctor now or seek immediate medical care if:  ? · You have new or worsening numbness in your arms, buttocks or legs. ? · You have new or worsening weakness in your arms or legs. (This could make it hard to stand up.)   ?  · You lose control of your bladder or bowels. ? Watch closely for changes in your health, and be sure to contact your doctor if:  ? · Your neck pain is getting worse. ? · You are not getting better after 1 week. ? · You do not get better as expected. Where can you learn more? Go to http://ej-damari.info/. Enter [id number] in the search box to learn more about \"Neck Pain: Care Instructions. \"  Current as of: March 21, 2017  Content Version: 11.5  © 3263-8345 [employer]. Care instructions adapted under license by [employer] (which disclaims liability or warranty for this information). If you have questions about a medical condition or this instruction, always ask your healthcare professional. [address] any warranty or liability for your use of this information.

## 2022-07-13 ENCOUNTER — HOSPITAL ENCOUNTER (INPATIENT)
Age: 62
LOS: 12 days | Discharge: HOME HEALTH | DRG: 234 | End: 2022-07-25
Attending: STUDENT IN AN ORGANIZED HEALTH CARE EDUCATION/TRAINING PROGRAM | Admitting: INTERNAL MEDICINE
Payer: COMMERCIAL

## 2022-07-13 ENCOUNTER — APPOINTMENT (OUTPATIENT)
Dept: GENERAL RADIOLOGY | Age: 62
DRG: 234 | End: 2022-07-13
Attending: STUDENT IN AN ORGANIZED HEALTH CARE EDUCATION/TRAINING PROGRAM
Payer: COMMERCIAL

## 2022-07-13 ENCOUNTER — APPOINTMENT (OUTPATIENT)
Dept: NON INVASIVE DIAGNOSTICS | Age: 62
DRG: 234 | End: 2022-07-13
Attending: PHYSICIAN ASSISTANT
Payer: COMMERCIAL

## 2022-07-13 DIAGNOSIS — I47.29 NSVT (NONSUSTAINED VENTRICULAR TACHYCARDIA): ICD-10-CM

## 2022-07-13 DIAGNOSIS — J93.83 OTHER PNEUMOTHORAX: ICD-10-CM

## 2022-07-13 DIAGNOSIS — Z72.0 TOBACCO ABUSE: ICD-10-CM

## 2022-07-13 DIAGNOSIS — I21.4 NSTEMI (NON-ST ELEVATED MYOCARDIAL INFARCTION) (HCC): Primary | ICD-10-CM

## 2022-07-13 DIAGNOSIS — R07.9 CHEST PAIN, UNSPECIFIED TYPE: ICD-10-CM

## 2022-07-13 DIAGNOSIS — Z95.1 S/P CABG X 3: ICD-10-CM

## 2022-07-13 DIAGNOSIS — I25.110 CORONARY ARTERY DISEASE INVOLVING NATIVE CORONARY ARTERY OF NATIVE HEART WITH UNSTABLE ANGINA PECTORIS (HCC): ICD-10-CM

## 2022-07-13 LAB
ALBUMIN SERPL-MCNC: 3.5 G/DL (ref 3.4–5)
ALBUMIN SERPL-MCNC: 4.1 G/DL (ref 3.4–5)
ALBUMIN/GLOB SERPL: 0.9 {RATIO} (ref 0.8–1.7)
ALBUMIN/GLOB SERPL: 1 {RATIO} (ref 0.8–1.7)
ALP SERPL-CCNC: 59 U/L (ref 45–117)
ALP SERPL-CCNC: 69 U/L (ref 45–117)
ALT SERPL-CCNC: 12 U/L (ref 13–56)
ALT SERPL-CCNC: 14 U/L (ref 13–56)
ANION GAP SERPL CALC-SCNC: 5 MMOL/L (ref 3–18)
ANION GAP SERPL CALC-SCNC: 5 MMOL/L (ref 3–18)
APPEARANCE UR: CLEAR
APTT PPP: 33.3 SEC (ref 23–36.4)
APTT PPP: 74.1 SEC (ref 23–36.4)
AST SERPL-CCNC: 30 U/L (ref 10–38)
AST SERPL-CCNC: 38 U/L (ref 10–38)
ATRIAL RATE: 63 BPM
BASOPHILS # BLD: 0 K/UL (ref 0–0.1)
BASOPHILS NFR BLD: 0 % (ref 0–2)
BILIRUB DIRECT SERPL-MCNC: 0.2 MG/DL (ref 0–0.2)
BILIRUB SERPL-MCNC: 0.6 MG/DL (ref 0.2–1)
BILIRUB SERPL-MCNC: 1 MG/DL (ref 0.2–1)
BILIRUB UR QL: NEGATIVE
BNP SERPL-MCNC: 283 PG/ML (ref 0–900)
BUN SERPL-MCNC: 10 MG/DL (ref 7–18)
BUN SERPL-MCNC: 11 MG/DL (ref 7–18)
BUN/CREAT SERPL: 16 (ref 12–20)
BUN/CREAT SERPL: 16 (ref 12–20)
CALCIUM SERPL-MCNC: 8.9 MG/DL (ref 8.5–10.1)
CALCIUM SERPL-MCNC: 9.3 MG/DL (ref 8.5–10.1)
CALCULATED P AXIS, ECG09: 80 DEGREES
CALCULATED R AXIS, ECG10: -30 DEGREES
CALCULATED T AXIS, ECG11: 63 DEGREES
CHLORIDE SERPL-SCNC: 104 MMOL/L (ref 100–111)
CHLORIDE SERPL-SCNC: 107 MMOL/L (ref 100–111)
CHOLEST SERPL-MCNC: 138 MG/DL
CO2 SERPL-SCNC: 25 MMOL/L (ref 21–32)
CO2 SERPL-SCNC: 27 MMOL/L (ref 21–32)
COLOR UR: YELLOW
CREAT SERPL-MCNC: 0.63 MG/DL (ref 0.6–1.3)
CREAT SERPL-MCNC: 0.67 MG/DL (ref 0.6–1.3)
DIAGNOSIS, 93000: NORMAL
DIFFERENTIAL METHOD BLD: ABNORMAL
ECHO AO ROOT DIAM: 2.5 CM
ECHO AO ROOT INDEX: 1.54 CM/M2
ECHO LA VOL 2C: 27 ML (ref 22–52)
ECHO LA VOL 4C: 25 ML (ref 22–52)
ECHO LA VOLUME AREA LENGTH: 29 ML
ECHO LA VOLUME INDEX A2C: 17 ML/M2 (ref 16–34)
ECHO LA VOLUME INDEX A4C: 15 ML/M2 (ref 16–34)
ECHO LA VOLUME INDEX AREA LENGTH: 18 ML/M2 (ref 16–34)
ECHO LV E' LATERAL VELOCITY: 8 CM/S
ECHO LV E' SEPTAL VELOCITY: 9 CM/S
ECHO LV FRACTIONAL SHORTENING: 37 % (ref 28–44)
ECHO LV INTERNAL DIMENSION DIASTOLE INDEX: 2.35 CM/M2
ECHO LV INTERNAL DIMENSION DIASTOLIC: 3.8 CM (ref 3.9–5.3)
ECHO LV INTERNAL DIMENSION SYSTOLIC INDEX: 1.48 CM/M2
ECHO LV INTERNAL DIMENSION SYSTOLIC: 2.4 CM
ECHO LV IVSD: 0.8 CM (ref 0.6–0.9)
ECHO LV MASS 2D: 78.8 G (ref 67–162)
ECHO LV MASS INDEX 2D: 48.6 G/M2 (ref 43–95)
ECHO LV POSTERIOR WALL DIASTOLIC: 0.7 CM (ref 0.6–0.9)
ECHO LV RELATIVE WALL THICKNESS RATIO: 0.37
ECHO LVOT AREA: 2.3 CM2
ECHO LVOT DIAM: 1.7 CM
ECHO LVOT MEAN GRADIENT: 2 MMHG
ECHO LVOT PEAK GRADIENT: 3 MMHG
ECHO LVOT PEAK VELOCITY: 0.9 M/S
ECHO LVOT STROKE VOLUME INDEX: 27 ML/M2
ECHO LVOT SV: 43.8 ML
ECHO LVOT VTI: 19.3 CM
ECHO MV A VELOCITY: 0.78 M/S
ECHO MV E DECELERATION TIME (DT): 166.3 MS
ECHO MV E VELOCITY: 0.8 M/S
ECHO MV E/A RATIO: 1.03
ECHO MV E/E' LATERAL: 10
ECHO MV E/E' RATIO (AVERAGED): 9.44
ECHO MV E/E' SEPTAL: 8.89
ECHO RV FREE WALL PEAK S': 12 CM/S
ECHO RV TAPSE: 2 CM (ref 1.7–?)
EOSINOPHIL # BLD: 0 K/UL (ref 0–0.4)
EOSINOPHIL NFR BLD: 0 % (ref 0–5)
ERYTHROCYTE [DISTWIDTH] IN BLOOD BY AUTOMATED COUNT: 12.3 % (ref 11.6–14.5)
ERYTHROCYTE [DISTWIDTH] IN BLOOD BY AUTOMATED COUNT: 12.3 % (ref 11.6–14.5)
EST. AVERAGE GLUCOSE BLD GHB EST-MCNC: 123 MG/DL
GLOBULIN SER CALC-MCNC: 4.1 G/DL (ref 2–4)
GLOBULIN SER CALC-MCNC: 4.2 G/DL (ref 2–4)
GLUCOSE SERPL-MCNC: 110 MG/DL (ref 74–99)
GLUCOSE SERPL-MCNC: 94 MG/DL (ref 74–99)
GLUCOSE UR STRIP.AUTO-MCNC: NEGATIVE MG/DL
HBA1C MFR BLD: 5.9 % (ref 4.2–5.6)
HCT VFR BLD AUTO: 34.7 % (ref 35–45)
HCT VFR BLD AUTO: 39.4 % (ref 35–45)
HDLC SERPL-MCNC: 44 MG/DL (ref 40–60)
HDLC SERPL: 3.1 {RATIO} (ref 0–5)
HGB BLD-MCNC: 11.8 G/DL (ref 12–16)
HGB BLD-MCNC: 13.4 G/DL (ref 12–16)
HGB UR QL STRIP: NEGATIVE
IMM GRANULOCYTES # BLD AUTO: 0 K/UL (ref 0–0.04)
IMM GRANULOCYTES NFR BLD AUTO: 0 % (ref 0–0.5)
INR PPP: 1.1 (ref 0.8–1.2)
KETONES UR QL STRIP.AUTO: ABNORMAL MG/DL
LDLC SERPL CALC-MCNC: 83.6 MG/DL (ref 0–100)
LEUKOCYTE ESTERASE UR QL STRIP.AUTO: NEGATIVE
LIPID PROFILE,FLP: NORMAL
LYMPHOCYTES # BLD: 1.5 K/UL (ref 0.9–3.6)
LYMPHOCYTES NFR BLD: 15 % (ref 21–52)
MAGNESIUM SERPL-MCNC: 1.9 MG/DL (ref 1.6–2.6)
MCH RBC QN AUTO: 31.1 PG (ref 24–34)
MCH RBC QN AUTO: 31.3 PG (ref 24–34)
MCHC RBC AUTO-ENTMCNC: 34 G/DL (ref 31–37)
MCHC RBC AUTO-ENTMCNC: 34 G/DL (ref 31–37)
MCV RBC AUTO: 91.3 FL (ref 78–100)
MCV RBC AUTO: 92.1 FL (ref 78–100)
MONOCYTES # BLD: 0.8 K/UL (ref 0.05–1.2)
MONOCYTES NFR BLD: 8 % (ref 3–10)
NEUTS SEG # BLD: 7.5 K/UL (ref 1.8–8)
NEUTS SEG NFR BLD: 77 % (ref 40–73)
NITRITE UR QL STRIP.AUTO: NEGATIVE
NRBC # BLD: 0 K/UL (ref 0–0.01)
NRBC # BLD: 0 K/UL (ref 0–0.01)
NRBC BLD-RTO: 0 PER 100 WBC
NRBC BLD-RTO: 0 PER 100 WBC
P-R INTERVAL, ECG05: 142 MS
PH UR STRIP: 6.5 [PH] (ref 5–8)
PLATELET # BLD AUTO: 243 K/UL (ref 135–420)
PLATELET # BLD AUTO: 263 K/UL (ref 135–420)
PMV BLD AUTO: 10.1 FL (ref 9.2–11.8)
PMV BLD AUTO: 11.3 FL (ref 9.2–11.8)
POTASSIUM SERPL-SCNC: 3.9 MMOL/L (ref 3.5–5.5)
POTASSIUM SERPL-SCNC: 4 MMOL/L (ref 3.5–5.5)
PROT SERPL-MCNC: 7.6 G/DL (ref 6.4–8.2)
PROT SERPL-MCNC: 8.3 G/DL (ref 6.4–8.2)
PROT UR STRIP-MCNC: NEGATIVE MG/DL
PROTHROMBIN TIME: 14.8 SEC (ref 11.5–15.2)
Q-T INTERVAL, ECG07: 392 MS
QRS DURATION, ECG06: 92 MS
QTC CALCULATION (BEZET), ECG08: 446 MS
RBC # BLD AUTO: 3.8 M/UL (ref 4.2–5.3)
RBC # BLD AUTO: 4.28 M/UL (ref 4.2–5.3)
SODIUM SERPL-SCNC: 136 MMOL/L (ref 136–145)
SODIUM SERPL-SCNC: 137 MMOL/L (ref 136–145)
SP GR UR REFRACTOMETRY: 1.02 (ref 1–1.03)
TRIGL SERPL-MCNC: 52 MG/DL (ref ?–150)
TROPONIN-HIGH SENSITIVITY: 1177 NG/L (ref 0–54)
TROPONIN-HIGH SENSITIVITY: 1578 NG/L (ref 0–54)
TSH SERPL DL<=0.05 MIU/L-ACNC: 0.68 UIU/ML (ref 0.36–3.74)
UROBILINOGEN UR QL STRIP.AUTO: 2 EU/DL (ref 0.2–1)
VENTRICULAR RATE, ECG03: 78 BPM
VLDLC SERPL CALC-MCNC: 10.4 MG/DL
WBC # BLD AUTO: 8 K/UL (ref 4.6–13.2)
WBC # BLD AUTO: 9.8 K/UL (ref 4.6–13.2)

## 2022-07-13 PROCEDURE — 77030013797 HC KT TRNSDUC PRSSR EDWD -A: Performed by: INTERNAL MEDICINE

## 2022-07-13 PROCEDURE — 4A023N7 MEASUREMENT OF CARDIAC SAMPLING AND PRESSURE, LEFT HEART, PERCUTANEOUS APPROACH: ICD-10-PCS | Performed by: INTERNAL MEDICINE

## 2022-07-13 PROCEDURE — 74011000250 HC RX REV CODE- 250: Performed by: PHYSICIAN ASSISTANT

## 2022-07-13 PROCEDURE — 77030018729 HC ELECTRD DEFIB PAD CARD -B: Performed by: INTERNAL MEDICINE

## 2022-07-13 PROCEDURE — 93005 ELECTROCARDIOGRAM TRACING: CPT

## 2022-07-13 PROCEDURE — B2111ZZ FLUOROSCOPY OF MULTIPLE CORONARY ARTERIES USING LOW OSMOLAR CONTRAST: ICD-10-PCS | Performed by: INTERNAL MEDICINE

## 2022-07-13 PROCEDURE — 99285 EMERGENCY DEPT VISIT HI MDM: CPT

## 2022-07-13 PROCEDURE — 80061 LIPID PANEL: CPT

## 2022-07-13 PROCEDURE — 84443 ASSAY THYROID STIM HORMONE: CPT

## 2022-07-13 PROCEDURE — 85025 COMPLETE CBC W/AUTO DIFF WBC: CPT

## 2022-07-13 PROCEDURE — 83880 ASSAY OF NATRIURETIC PEPTIDE: CPT

## 2022-07-13 PROCEDURE — 74011250636 HC RX REV CODE- 250/636: Performed by: INTERNAL MEDICINE

## 2022-07-13 PROCEDURE — 74011250636 HC RX REV CODE- 250/636: Performed by: PHYSICIAN ASSISTANT

## 2022-07-13 PROCEDURE — 74011000636 HC RX REV CODE- 636: Performed by: INTERNAL MEDICINE

## 2022-07-13 PROCEDURE — 71045 X-RAY EXAM CHEST 1 VIEW: CPT

## 2022-07-13 PROCEDURE — 74011250637 HC RX REV CODE- 250/637: Performed by: PHYSICIAN ASSISTANT

## 2022-07-13 PROCEDURE — 99152 MOD SED SAME PHYS/QHP 5/>YRS: CPT | Performed by: INTERNAL MEDICINE

## 2022-07-13 PROCEDURE — 83036 HEMOGLOBIN GLYCOSYLATED A1C: CPT

## 2022-07-13 PROCEDURE — 80053 COMPREHEN METABOLIC PANEL: CPT

## 2022-07-13 PROCEDURE — 84484 ASSAY OF TROPONIN QUANT: CPT

## 2022-07-13 PROCEDURE — 83735 ASSAY OF MAGNESIUM: CPT

## 2022-07-13 PROCEDURE — C1894 INTRO/SHEATH, NON-LASER: HCPCS | Performed by: INTERNAL MEDICINE

## 2022-07-13 PROCEDURE — 81003 URINALYSIS AUTO W/O SCOPE: CPT

## 2022-07-13 PROCEDURE — 77030029997 HC DEV COM RDL R BND TELE -B: Performed by: INTERNAL MEDICINE

## 2022-07-13 PROCEDURE — 80076 HEPATIC FUNCTION PANEL: CPT

## 2022-07-13 PROCEDURE — 93306 TTE W/DOPPLER COMPLETE: CPT

## 2022-07-13 PROCEDURE — 2709999900 HC NON-CHARGEABLE SUPPLY: Performed by: INTERNAL MEDICINE

## 2022-07-13 PROCEDURE — 74011250637 HC RX REV CODE- 250/637: Performed by: INTERNAL MEDICINE

## 2022-07-13 PROCEDURE — 36415 COLL VENOUS BLD VENIPUNCTURE: CPT

## 2022-07-13 PROCEDURE — 93458 L HRT ARTERY/VENTRICLE ANGIO: CPT | Performed by: INTERNAL MEDICINE

## 2022-07-13 PROCEDURE — 77030012597: Performed by: INTERNAL MEDICINE

## 2022-07-13 PROCEDURE — 74011000250 HC RX REV CODE- 250: Performed by: INTERNAL MEDICINE

## 2022-07-13 PROCEDURE — 77030013761 HC KT HRT LFT ANGI -B: Performed by: INTERNAL MEDICINE

## 2022-07-13 PROCEDURE — 96374 THER/PROPH/DIAG INJ IV PUSH: CPT

## 2022-07-13 PROCEDURE — 85027 COMPLETE CBC AUTOMATED: CPT

## 2022-07-13 PROCEDURE — B2151ZZ FLUOROSCOPY OF LEFT HEART USING LOW OSMOLAR CONTRAST: ICD-10-PCS | Performed by: INTERNAL MEDICINE

## 2022-07-13 PROCEDURE — 85730 THROMBOPLASTIN TIME PARTIAL: CPT

## 2022-07-13 PROCEDURE — 99223 1ST HOSP IP/OBS HIGH 75: CPT | Performed by: INTERNAL MEDICINE

## 2022-07-13 PROCEDURE — 65660000004 HC RM CVT STEPDOWN

## 2022-07-13 PROCEDURE — 85610 PROTHROMBIN TIME: CPT

## 2022-07-13 PROCEDURE — 77030015766: Performed by: INTERNAL MEDICINE

## 2022-07-13 RX ORDER — VERAPAMIL HYDROCHLORIDE 2.5 MG/ML
INJECTION, SOLUTION INTRAVENOUS AS NEEDED
Status: DISCONTINUED | OUTPATIENT
Start: 2022-07-13 | End: 2022-07-13 | Stop reason: HOSPADM

## 2022-07-13 RX ORDER — NITROGLYCERIN 40 MG/100ML
5 INJECTION INTRAVENOUS CONTINUOUS
Status: DISCONTINUED | OUTPATIENT
Start: 2022-07-13 | End: 2022-07-16 | Stop reason: SDUPTHER

## 2022-07-13 RX ORDER — ENOXAPARIN SODIUM 100 MG/ML
40 INJECTION SUBCUTANEOUS EVERY 24 HOURS
Status: DISCONTINUED | OUTPATIENT
Start: 2022-07-14 | End: 2022-07-13

## 2022-07-13 RX ORDER — IBUPROFEN 200 MG
1 TABLET ORAL DAILY
Status: DISCONTINUED | OUTPATIENT
Start: 2022-07-14 | End: 2022-07-25 | Stop reason: HOSPADM

## 2022-07-13 RX ORDER — NITROGLYCERIN 0.4 MG/1
0.4 TABLET SUBLINGUAL AS NEEDED
Status: DISCONTINUED | OUTPATIENT
Start: 2022-07-13 | End: 2022-07-15

## 2022-07-13 RX ORDER — ATORVASTATIN CALCIUM 40 MG/1
40 TABLET, FILM COATED ORAL
Status: DISCONTINUED | OUTPATIENT
Start: 2022-07-13 | End: 2022-07-13 | Stop reason: SDUPTHER

## 2022-07-13 RX ORDER — DOCUSATE SODIUM 100 MG/1
100 CAPSULE, LIQUID FILLED ORAL 2 TIMES DAILY
Status: DISCONTINUED | OUTPATIENT
Start: 2022-07-13 | End: 2022-07-15

## 2022-07-13 RX ORDER — HEPARIN SODIUM 10000 [USP'U]/100ML
12-25 INJECTION, SOLUTION INTRAVENOUS
Status: DISCONTINUED | OUTPATIENT
Start: 2022-07-13 | End: 2022-07-13

## 2022-07-13 RX ORDER — VERAPAMIL HYDROCHLORIDE 2.5 MG/ML
INJECTION, SOLUTION INTRAVENOUS
Status: DISPENSED
Start: 2022-07-13 | End: 2022-07-14

## 2022-07-13 RX ORDER — VERAPAMIL HYDROCHLORIDE 2.5 MG/ML
INJECTION, SOLUTION INTRAVENOUS
Status: DISCONTINUED
Start: 2022-07-13 | End: 2022-07-13 | Stop reason: WASHOUT

## 2022-07-13 RX ORDER — FENTANYL CITRATE 50 UG/ML
INJECTION, SOLUTION INTRAMUSCULAR; INTRAVENOUS AS NEEDED
Status: DISCONTINUED | OUTPATIENT
Start: 2022-07-13 | End: 2022-07-13 | Stop reason: HOSPADM

## 2022-07-13 RX ORDER — LIDOCAINE HYDROCHLORIDE 10 MG/ML
INJECTION, SOLUTION EPIDURAL; INFILTRATION; INTRACAUDAL; PERINEURAL
Status: DISPENSED
Start: 2022-07-13 | End: 2022-07-14

## 2022-07-13 RX ORDER — HEPARIN SODIUM 1000 [USP'U]/ML
60 INJECTION, SOLUTION INTRAVENOUS; SUBCUTANEOUS ONCE
Status: COMPLETED | OUTPATIENT
Start: 2022-07-13 | End: 2022-07-13

## 2022-07-13 RX ORDER — MIDAZOLAM HYDROCHLORIDE 1 MG/ML
INJECTION, SOLUTION INTRAMUSCULAR; INTRAVENOUS AS NEEDED
Status: DISCONTINUED | OUTPATIENT
Start: 2022-07-13 | End: 2022-07-13 | Stop reason: HOSPADM

## 2022-07-13 RX ORDER — NITROGLYCERIN 40 MG/100ML
5 INJECTION INTRAVENOUS CONTINUOUS
Status: DISCONTINUED | OUTPATIENT
Start: 2022-07-13 | End: 2022-07-13

## 2022-07-13 RX ORDER — FENTANYL CITRATE 50 UG/ML
INJECTION, SOLUTION INTRAMUSCULAR; INTRAVENOUS
Status: DISPENSED
Start: 2022-07-13 | End: 2022-07-14

## 2022-07-13 RX ORDER — HEPARIN SODIUM 1000 [USP'U]/ML
INJECTION, SOLUTION INTRAVENOUS; SUBCUTANEOUS
Status: DISCONTINUED
Start: 2022-07-13 | End: 2022-07-13 | Stop reason: WASHOUT

## 2022-07-13 RX ORDER — SODIUM CHLORIDE 0.9 % (FLUSH) 0.9 %
5-40 SYRINGE (ML) INJECTION EVERY 8 HOURS
Status: DISCONTINUED | OUTPATIENT
Start: 2022-07-13 | End: 2022-07-15

## 2022-07-13 RX ORDER — METOPROLOL TARTRATE 25 MG/1
12.5 TABLET, FILM COATED ORAL EVERY 12 HOURS
Status: DISCONTINUED | OUTPATIENT
Start: 2022-07-13 | End: 2022-07-15

## 2022-07-13 RX ORDER — HEPARIN SODIUM 10000 [USP'U]/100ML
12-25 INJECTION, SOLUTION INTRAVENOUS CONTINUOUS
Status: DISCONTINUED | OUTPATIENT
Start: 2022-07-13 | End: 2022-07-16

## 2022-07-13 RX ORDER — SODIUM CHLORIDE 0.9 % (FLUSH) 0.9 %
5-40 SYRINGE (ML) INJECTION AS NEEDED
Status: DISCONTINUED | OUTPATIENT
Start: 2022-07-13 | End: 2022-07-15

## 2022-07-13 RX ORDER — MIDAZOLAM HYDROCHLORIDE 1 MG/ML
INJECTION, SOLUTION INTRAMUSCULAR; INTRAVENOUS
Status: DISPENSED
Start: 2022-07-13 | End: 2022-07-14

## 2022-07-13 RX ORDER — HEPARIN SODIUM 200 [USP'U]/100ML
INJECTION, SOLUTION INTRAVENOUS AS NEEDED
Status: DISCONTINUED | OUTPATIENT
Start: 2022-07-13 | End: 2022-07-13 | Stop reason: HOSPADM

## 2022-07-13 RX ORDER — HEPARIN SODIUM 200 [USP'U]/100ML
INJECTION, SOLUTION INTRAVENOUS
Status: DISCONTINUED
Start: 2022-07-13 | End: 2022-07-13 | Stop reason: WASHOUT

## 2022-07-13 RX ORDER — GUAIFENESIN 100 MG/5ML
81 LIQUID (ML) ORAL DAILY
Status: DISCONTINUED | OUTPATIENT
Start: 2022-07-14 | End: 2022-07-15

## 2022-07-13 RX ORDER — HEPARIN SODIUM 1000 [USP'U]/ML
INJECTION, SOLUTION INTRAVENOUS; SUBCUTANEOUS
Status: DISPENSED
Start: 2022-07-13 | End: 2022-07-14

## 2022-07-13 RX ORDER — HEPARIN SODIUM 200 [USP'U]/100ML
INJECTION, SOLUTION INTRAVENOUS
Status: DISPENSED
Start: 2022-07-13 | End: 2022-07-14

## 2022-07-13 RX ORDER — ATORVASTATIN CALCIUM 40 MG/1
40 TABLET, FILM COATED ORAL
Status: DISCONTINUED | OUTPATIENT
Start: 2022-07-13 | End: 2022-07-25 | Stop reason: HOSPADM

## 2022-07-13 RX ORDER — ADHESIVE BANDAGE
30 BANDAGE TOPICAL DAILY PRN
Status: DISCONTINUED | OUTPATIENT
Start: 2022-07-13 | End: 2022-07-15

## 2022-07-13 RX ORDER — GUAIFENESIN 100 MG/5ML
81 LIQUID (ML) ORAL DAILY
Status: DISCONTINUED | OUTPATIENT
Start: 2022-07-14 | End: 2022-07-13 | Stop reason: SDUPTHER

## 2022-07-13 RX ORDER — LIDOCAINE HYDROCHLORIDE 10 MG/ML
INJECTION, SOLUTION EPIDURAL; INFILTRATION; INTRACAUDAL; PERINEURAL AS NEEDED
Status: DISCONTINUED | OUTPATIENT
Start: 2022-07-13 | End: 2022-07-13 | Stop reason: HOSPADM

## 2022-07-13 RX ADMIN — NITROGLYCERIN 5 MCG/MIN: 40 INJECTION INTRAVENOUS at 12:53

## 2022-07-13 RX ADMIN — NITROGLYCERIN 5 MCG/MIN: 40 INJECTION INTRAVENOUS at 18:09

## 2022-07-13 RX ADMIN — HEPARIN SODIUM 3700 UNITS: 1000 INJECTION INTRAVENOUS; SUBCUTANEOUS at 11:37

## 2022-07-13 RX ADMIN — METOPROLOL TARTRATE 12.5 MG: 25 TABLET, FILM COATED ORAL at 12:57

## 2022-07-13 RX ADMIN — ATORVASTATIN CALCIUM 40 MG: 40 TABLET, FILM COATED ORAL at 21:55

## 2022-07-13 RX ADMIN — NITROGLYCERIN 0.4 MG: 0.4 TABLET, ORALLY DISINTEGRATING SUBLINGUAL at 11:32

## 2022-07-13 RX ADMIN — SODIUM CHLORIDE, PRESERVATIVE FREE 10 ML: 5 INJECTION INTRAVENOUS at 21:55

## 2022-07-13 RX ADMIN — HEPARIN SODIUM 12 UNITS/KG/HR: 10000 INJECTION, SOLUTION INTRAVENOUS at 11:40

## 2022-07-13 RX ADMIN — METOPROLOL TARTRATE 12.5 MG: 25 TABLET, FILM COATED ORAL at 21:55

## 2022-07-13 RX ADMIN — HEPARIN SODIUM 12 UNITS/KG/HR: 10000 INJECTION, SOLUTION INTRAVENOUS at 18:07

## 2022-07-13 NOTE — Clinical Note
Contrast Dose Calculator:   Patient's age: 57.   Patient's sex: Female. Patient weight (kg) = 62. Creatinine level (mg/dL) = 0.67. Creatinine clearance (mL/min): 83.03. Max Contrast dose per Creatinine Cl calculator = 186.81 mL.

## 2022-07-13 NOTE — PROGRESS NOTES
Patient admitted with some chest pain and elevated cardiac enzyme, non-STEMI  Dr. Mitali Sadler who saw the patient requested cardiac catheterization to rule out CAD and possible PCI  Patient with stuttering chest pain and lower abdominal discomfort. Discussed regarding management strategy which includes medical management vs. Ischemia evaluation ( non-invasive vs. Invasive). Risk, benefit and alternatives of each strategy discussed in detail. Risk, benefit, complication of LHC and possible PCI ( including but not limited to bleeding, vascular trauma requiring surgery,  infection, heart failure, stroke, MI, emergent bypass surgery, severe allergic reactions, kidney failure, dialysis and death ) were discussed with patient and daughter and willing to proceed with procedure. Will be using moderate sedation   By stating these are possible risks, this does not exclude the potential for additional risks not named here.

## 2022-07-13 NOTE — Clinical Note
Status[de-identified] INPATIENT [101]   Type of Bed: Telemetry [19]   Cardiac Monitoring Required?: Yes   Inpatient Hospitalization Certified Necessary for the Following Reasons: 3.  Patient receiving treatment that can only be provided in an inpatient setting (further clarification in H&P documentation)   Admitting Diagnosis: NSTEMI (non-ST elevated myocardial infarction) [facility]) [[id number]]   Admitting Physician: Leslie Braun [[id number]]   Attending Physician: Ny Ulloa   Estimated Length of Stay: 3-4 Midnights   Discharge Plan[de-identified] Home with Office Follow-up

## 2022-07-13 NOTE — PROGRESS NOTES
completed the initial Spiritual Assessment of the patient, in bed 20 of the emergency room and offered Pastoral Care support to patient and family. There is no advance directive present. Patient does not have any Advent/cultural needs that will affect patients preferences in health care. Chaplains will continue to follow and will provide pastoral care on an as needed/requested basis.     Demian Craven  [facility] Care Department  [phone]

## 2022-07-13 NOTE — H&P
History and Physical    Patient: Wander Griffin MRN: [id number]  SSN: [id number]    YOB: 1960  Age: 57 y.o. Sex: female      None    C/C : Chest pain    Subjective:      Wander Griffin is a 57 y.o. female with past medical history of chronic tobacco use complaint of chest pain central chest area last night, today she went to the urgent care center room. She was sent to [facility] ER from bed patient is admitted to [facility] for chest pain with positive troponin elevation. [facility] where she was given nitro and 4 baby aspirin and was referred to ER. Chest pain localized in the central chest area, not associated with exercise, not associated with nausea vomiting shortness of breath palpitation dizziness. No similar episode in the past, severity was moderate quality was sharp. In ED at [facility]  -Elevated troponin diagnosed NSTEMI started on heparin  -Chest x-ray no infiltrate    No past medical history on file. Past Surgical History:   Procedure Laterality Date    HX PARTIAL HYSTERECTOMY        No family history on file. Social History     Tobacco Use    Smoking status: Current Every Day Smoker     Packs/day: 0.50    Smokeless tobacco: Not on file   Substance Use Topics    Alcohol use: No      Prior to Admission medications    Medication Sig Start Date End Date Taking? Authorizing Provider   ibuprofen (MOTRIN) 600 mg tablet Take 1 Tab by mouth every six (6) hours as needed for Pain. 1/1/20   aTmra Catherine PA-C   cyclobenzaprine (FLEXERIL) 10 mg tablet Take 1 Tab by mouth three (3) times daily as needed for Muscle Spasm(s). 1/1/20   Tamra Catherine PA-C   acetaminophen (TYLENOL) 325 mg tablet Take 2 Tabs by mouth every four (4) hours as needed for Pain. 10/11/17   Layne Mullen MD        No Known Allergies        Review of Systems:  positive responses in bold type   Constitutional: Negative for fever, chills, diaphoresis and unexpected weight change. HENT: Negative for ear pain, congestion, sore throat, rhinorrhea, drooling, trouble swallowing, neck pain and tinnitus. Eyes: Negative for photophobia, pain, redness and visual disturbance. Respiratory: negative for shortness of breath, cough, choking, chest tightness, wheezing or stridor. Cardiovascular: Negative for chest pain, palpitations and leg swelling. Gastrointestinal: Negative for nausea, vomiting, abdominal pain, diarrhea, constipation, blood in stool, abdominal distention and centralized bleeding. Genitourinary: Negative for dysuria, urgency, frequency, hematuria, flank pain and difficulty urinating. Musculoskeletal: Negative for back pain and arthralgias. Skin: Negative for color change, rash and wound. Neurological: Negative for dizziness, seizures, syncope, speech difficulty, light-headedness or headaches. Hematological: Does not bruise/bleed easily. Psychiatric/Behavioral: Negative for suicidal ideas, hallucinations, behavioral problems, self-injury or agitation         Objective: Body mass index is 24.87 kg/m².   Vitals:    07/13/22 1117 07/13/22 1125 07/13/22 1140 07/13/22 1155   BP:  (!) 152/69  120/68   Pulse:  77 76 89   Resp:  22 23 28   Temp:       SpO2:  99% 98% 100%   Weight: 61.7 kg (136 lb)           Physical Exam:  General appearance - alert, well appearing, and in no distress  Mental status - alert, oriented to person, place, and time  Eyes - pupils equal and reactive, extraocular eye movements intact  Ears - bilateral TM's and external ear canals normal  Nose - normal and patent, no erythema, discharge or polyps  Chest - clear to auscultation, no wheezes, rales or rhonchi, symmetric air entry  Heart - normal rate, regular rhythm, normal S1, S2, no murmurs, rubs, clicks or gallops  Abdomen - soft, nontender, nondistended, no masses or organomegaly  Musculoskeletal - no joint tenderness, deformity or swelling          Hospital Problems  Never Reviewed          Codes Class Noted POA    NSTEMI (non-ST elevated myocardial infarction) ([facility] [address] ICD-10-CM: I21.4  ICD-9-CM: 410.70  7/13/2022 Unknown              CBC:  Lab Results   Component Value Date/Time    WBC 9.8 07/13/2022 10:31 AM    HGB 13.4 07/13/2022 10:31 AM    HCT 39.4 07/13/2022 10:31 AM    PLATELET 569 95/84/3438 10:31 AM    MCV 92.1 07/13/2022 10:31 AM        CMP:  Lab Results   Component Value Date/Time    Sodium 136 07/13/2022 10:31 AM    Potassium 3.9 07/13/2022 10:31 AM    Chloride 104 07/13/2022 10:31 AM    CO2 27 07/13/2022 10:31 AM    Anion gap 5 07/13/2022 10:31 AM    Glucose 110 (H) 07/13/2022 10:31 AM    BUN 11 07/13/2022 10:31 AM    Creatinine 0.67 07/13/2022 10:31 AM    BUN/Creatinine ratio 16 07/13/2022 10:31 AM    GFR est AA >60 07/13/2022 10:31 AM    GFR est non-AA >60 07/13/2022 10:31 AM    Calcium 9.3 07/13/2022 10:31 AM    Alk. phosphatase 69 07/13/2022 10:31 AM    Protein, total 8.3 (H) 07/13/2022 10:31 AM    Albumin 4.1 07/13/2022 10:31 AM    Globulin 4.2 (H) 07/13/2022 10:31 AM    A-G Ratio 1.0 07/13/2022 10:31 AM    ALT (SGPT) 12 (L) 07/13/2022 10:31 AM        PT/INR  Lab Results   Component Value Date/Time    INR 0.9 08/14/2018 08:40 AM    INR 0.9 05/23/2013 04:19 PM    Prothrombin time 12.3 08/14/2018 08:40 AM    Prothrombin time 12.0 05/23/2013 04:19 PM            EKG: No results found for this or any previous visit.        Assessment And  Plan:     1 chest pain  2 NSTEMI  3 chronic tobacco use    Plan    -Patient started on Heparin, statin, beta-blocker, aspirin  -Cardiology consultation  -We will keep patient n.p.o. for possible cath soon either today or tomorrow  -Strong recommendation to stop smoking patient understood well  -Follow lipid profile, follow echocardiogram  -Nicotine patch for smoking history    Signed By: Joselin Paul MD     July 13, 2022

## 2022-07-13 NOTE — PROGRESS NOTES
TRANSFER - IN REPORT:  Verbal report received from FRANKIE Tapia(name) on [facility]  being received from Cath Lab(unit) for routine post - op    Report consisted of patients Situation, Background, Assessment and   Recommendations(SBAR). Information from the following report(s) SBAR, Procedure Summary, MAR, Accordion and Recent Results was reviewed with the receiving nurse. Opportunity for questions and clarification was provided. Assessment completed upon patients arrival to unit and care assumed.

## 2022-07-13 NOTE — ED PROVIDER NOTES
EMERGENCY DEPARTMENT HISTORY AND PHYSICAL EXAM    Date: 7/13/2022  Patient Name: Kassie Irene    History of Presenting Illness     Chief Complaint   Patient presents with    Chest Pain         History Provided By: Patient    Chief Complaint: Chest pain  Duration: Last night around 8:00  Timing: Constant  Location: Center of chest without radiation  Quality: Sharp  Severity: Moderate   Modifying Factors: Improved with nitro  Associated Symptoms: none       Additional History (Context): Kassie Irene is a 57 y.o. female with a history of tobacco abuse who presents today for history as listed above. Patient reports she smokes roughly a pack a day. States she has no medical history. Does not take any medications daily. Reports last night she started having some centralized chest pain. Denies any radiation or associated shortness of breath, dizziness, nausea, vomiting or diaphoresis. Reports that she thought it was just indigestion. Reports that she went to urgent care today and was advised to come to the emergency department. Reports that they gave her 4 baby aspirin and a nitro while there. Reports the nitro did help and she has gotten some relief but still has some central chest pressure. Is not on blood thinners. Denies alcohol/drug use. PCP: None    Current Facility-Administered Medications   Medication Dose Route Frequency Provider Last Rate Last Admin    heparin (porcine) 25,000 units in 0.45% saline 250 ml infusion  12-25 Units/kg/hr IntraVENous TITRATE Ed Borjas PA 7.4 mL/hr at 07/13/22 1140 12 Units/kg/hr at 07/13/22 1140    nitroglycerin (NITROSTAT) tablet 0.4 mg  0.4 mg SubLINGual PRN GREER Valdez   0.4 mg at 07/13/22 1132     Current Outpatient Medications   Medication Sig Dispense Refill    ibuprofen (MOTRIN) 600 mg tablet Take 1 Tab by mouth every six (6) hours as needed for Pain.  30 Tab 0    cyclobenzaprine (FLEXERIL) 10 mg tablet Take 1 Tab by mouth three (3) times daily as needed for Muscle Spasm(s). 15 Tab 0    acetaminophen (TYLENOL) 325 mg tablet Take 2 Tabs by mouth every four (4) hours as needed for Pain. 20 Tab 0       Past History     Past Medical History:  No past medical history on file. Past Surgical History:  Past Surgical History:   Procedure Laterality Date    HX PARTIAL HYSTERECTOMY         Family History:  No family history on file. Social History:  Social History     Tobacco Use    Smoking status: Current Every Day Smoker     Packs/day: 0.50    Smokeless tobacco: Not on file   Substance Use Topics    Alcohol use: No    Drug use: No       Allergies:  No Known Allergies      Review of Systems   Review of Systems   Constitutional: Negative for chills and fever. HENT: Negative for congestion, rhinorrhea and sore throat. Respiratory: Negative for cough and shortness of breath. Cardiovascular: Positive for chest pain. Negative for palpitations and leg swelling. Gastrointestinal: Negative for abdominal pain, blood in stool, constipation, diarrhea, nausea and vomiting. Genitourinary: Negative for dysuria, frequency and hematuria. Musculoskeletal: Negative for back pain and myalgias. Skin: Negative for rash and wound. Neurological: Negative for dizziness and headaches. All other systems reviewed and are negative. All Other Systems Negative  Physical Exam     Vitals:    07/13/22 1025 07/13/22 1117   BP: (!) 149/72    Pulse: 81    Resp: 24    Temp: 98 °F (36.7 °C)    SpO2: 96%    Weight:  61.7 kg (136 lb)     Physical Exam  Vitals and nursing note reviewed. Constitutional:       General: She is not in acute distress. Appearance: She is well-developed. She is not diaphoretic. HENT:      Head: Normocephalic and atraumatic. Eyes:      Conjunctiva/sclera: Conjunctivae normal.   Cardiovascular:      Rate and Rhythm: Normal rate and regular rhythm. Pulses: No decreased pulses. Heart sounds: Normal heart sounds.     No systolic murmur is present. No diastolic murmur is present. Pulmonary:      Effort: Pulmonary effort is normal. No respiratory distress. Breath sounds: Normal breath sounds. Chest:      Chest wall: No tenderness. Abdominal:      General: Bowel sounds are normal. There is no distension. Palpations: Abdomen is soft. Tenderness: There is no abdominal tenderness. There is no guarding or rebound. Musculoskeletal:         General: No deformity. Cervical back: Normal range of motion and neck supple. Right lower leg: No edema. Left lower leg: No edema. Skin:     General: Skin is warm and dry. Neurological:      Mental Status: She is alert and oriented to person, place, and time. Deep Tendon Reflexes: Reflexes are normal and symmetric. Diagnostic Study Results     Labs -     Recent Results (from the past 12 hour(s))   URINALYSIS W/ RFLX MICROSCOPIC    Collection Time: 07/13/22 10:26 AM   Result Value Ref Range    Color YELLOW      Appearance CLEAR      Specific gravity 1.018 1.005 - 1.030      pH (UA) 6.5 5.0 - 8.0      Protein Negative NEG mg/dL    Glucose Negative NEG mg/dL    Ketone TRACE (A) NEG mg/dL    Bilirubin Negative NEG      Blood Negative NEG      Urobilinogen 2.0 (H) 0.2 - 1.0 EU/dL    Nitrites Negative NEG      Leukocyte Esterase Negative NEG     CBC WITH AUTOMATED DIFF    Collection Time: 07/13/22 10:31 AM   Result Value Ref Range    WBC 9.8 4.6 - 13.2 K/uL    RBC 4.28 4.20 - 5.30 M/uL    HGB 13.4 12.0 - 16.0 g/dL    HCT 39.4 35.0 - 45.0 %    MCV 92.1 78.0 - 100.0 FL    MCH 31.3 24.0 - 34.0 PG    MCHC 34.0 31.0 - 37.0 g/dL    RDW 12.3 11.6 - 14.5 %    PLATELET 397 325 - 248 K/uL    MPV 10.1 9.2 - 11.8 FL    NRBC 0.0 0  WBC    ABSOLUTE NRBC 0.00 0.00 - 0.01 K/uL    NEUTROPHILS 77 (H) 40 - 73 %    LYMPHOCYTES 15 (L) 21 - 52 %    MONOCYTES 8 3 - 10 %    EOSINOPHILS 0 0 - 5 %    BASOPHILS 0 0 - 2 %    IMMATURE GRANULOCYTES 0 0.0 - 0.5 %    ABS. NEUTROPHILS 7.5 1.8 - 8.0 K/UL    ABS. LYMPHOCYTES 1.5 0.9 - 3.6 K/UL    ABS. MONOCYTES 0.8 0.05 - 1.2 K/UL    ABS. EOSINOPHILS 0.0 0.0 - 0.4 K/UL    ABS. BASOPHILS 0.0 0.0 - 0.1 K/UL    ABS. IMM. GRANS. 0.0 0.00 - 0.04 K/UL    DF AUTOMATED     METABOLIC PANEL, COMPREHENSIVE    Collection Time: 07/13/22 10:31 AM   Result Value Ref Range    Sodium 136 136 - 145 mmol/L    Potassium 3.9 3.5 - 5.5 mmol/L    Chloride 104 100 - 111 mmol/L    CO2 27 21 - 32 mmol/L    Anion gap 5 3.0 - 18 mmol/L    Glucose 110 (H) 74 - 99 mg/dL    BUN 11 7.0 - 18 MG/DL    Creatinine 0.67 0.6 - 1.3 MG/DL    BUN/Creatinine ratio 16 12 - 20      GFR est AA >60 >60 ml/min/1.73m2    GFR est non-AA >60 >60 ml/min/1.73m2    Calcium 9.3 8.5 - 10.1 MG/DL    Bilirubin, total 0.6 0.2 - 1.0 MG/DL    ALT (SGPT) 12 (L) 13 - 56 U/L    AST (SGOT) 30 10 - 38 U/L    Alk. phosphatase 69 45 - 117 U/L    Protein, total 8.3 (H) 6.4 - 8.2 g/dL    Albumin 4.1 3.4 - 5.0 g/dL    Globulin 4.2 (H) 2.0 - 4.0 g/dL    A-G Ratio 1.0 0.8 - 1.7     TROPONIN-HIGH SENSITIVITY    Collection Time: 07/13/22 10:31 AM   Result Value Ref Range    Troponin-High Sensitivity 1,177 (HH) 0 - 54 ng/L   NT-PRO BNP    Collection Time: 07/13/22 10:31 AM   Result Value Ref Range    NT pro- 0 - 900 PG/ML   MAGNESIUM    Collection Time: 07/13/22 10:31 AM   Result Value Ref Range    Magnesium 1.9 1.6 - 2.6 mg/dL       Radiologic Studies -   XR CHEST PORT   Final Result    IMPRESSION:   1. No acute cardiopulmonary process. No change. CT Results  (Last 48 hours)    None        CXR Results  (Last 48 hours)               07/13/22 1049  XR CHEST PORT Final result    Impression:   IMPRESSION:   1. No acute cardiopulmonary process. No change. Narrative:  HISTORY: Shortness of breath. Exam: Chest.       Technique: Single view portable upright chest. 11/14/2006. FINDINGS: There is no pneumothorax, pneumonia or pleural effusions.  Heart and   mediastinal structures are unchanged. Bony thorax and soft tissues are   unchanged. Medical Decision Making   I am the first provider for this patient. I reviewed the vital signs, available nursing notes, past medical history, past surgical history, family history and social history. Vital Signs-Reviewed the patient's vital signs. Records Reviewed: Nursing Notes and Old Medical Records     Procedures: None   Procedures    Provider Notes (Medical Decision Making):     Differential Diagnosis: Pneumonia, pulmonary embolism, costochondritis , angina/MI, pleurisy, pericarditis, myopericarditis, herpes zoster, aortic dissection, Prinzmetal angina, acute pericardial tamponade, GERD, PUD, esophageal motility disorders      Plan: Will order full cardiac work-up. ED Course as of 07/13/22 1145   Wed Jul 13, 2022   1125 Discussed case with cardiology PA who agrees with need for admission. Will consult hospitalist. [CS]   [phone] Discussed case with Dr. Jose Manuel Hernandez who also agrees with admission. Will place admission orders. [CS]      ED Course User Index  [CS] [facility]         MED RECONCILIATION:  Current Facility-Administered Medications   Medication Dose Route Frequency    heparin (porcine) 25,000 units in 0.45% saline 250 ml infusion  12-25 Units/kg/hr IntraVENous TITRATE    nitroglycerin (NITROSTAT) tablet 0.4 mg  0.4 mg SubLINGual PRN     Current Outpatient Medications   Medication Sig    ibuprofen (MOTRIN) 600 mg tablet Take 1 Tab by mouth every six (6) hours as needed for Pain.  cyclobenzaprine (FLEXERIL) 10 mg tablet Take 1 Tab by mouth three (3) times daily as needed for Muscle Spasm(s).  acetaminophen (TYLENOL) 325 mg tablet Take 2 Tabs by mouth every four (4) hours as needed for Pain. Disposition:  Admit      Diagnosis     Clinical Impression:   1. NSTEMI (non-ST elevated myocardial infarction) (HCC)    2. Chest pain, unspecified type    3.  Tobacco abuse          \"Please note that this dictation was completed with [employer], the [employer] voice recognition software. Quite often unanticipated grammatical, syntax, homophones, and other interpretive errors are inadvertently transcribed by the computer software. Please disregard these errors. Please excuse any errors that have escaped final proofreading. \"

## 2022-07-13 NOTE — Clinical Note
TRANSFER - IN REPORT:     Verbal report received from: Román Beltran ED RN. Report consisted of patient's Situation, Background, Assessment and   Recommendations(SBAR). Opportunity for questions and clarification was provided. Assessment completed upon patient's arrival to unit and care assumed. Patient transported with a Registered Nurse, [address] [place] [address] / Patient Care Tech and Monitor.

## 2022-07-13 NOTE — Clinical Note
TRANSFER - OUT REPORT:     Verbal report given to: Yvonne Johnson RN. Report consisted of patient's Situation, Background, Assessment and   Recommendations(SBAR). Opportunity for questions and clarification was provided. Patient transported with a Registered Nurse. Patient transported to: holding area.

## 2022-07-13 NOTE — PROGRESS NOTES
TRANSFER - IN REPORT:    Verbal report received from [address] (name) on Mireya Shea  being received from cath (unit) for routine progression of care      Report consisted of patients Situation, Background, Assessment and   Recommendations(SBAR). Information from the following report(s) SBAR, OR Summary, Med Rec Status and Cardiac Rhythm pain was reviewed with the receiving nurse. Opportunity for questions and clarification was provided. Assessment completed upon patients arrival to unit and care assumed.

## 2022-07-13 NOTE — PROGRESS NOTES
Problem: Pressure Injury - Risk of  Goal: *Prevention of pressure injury  Description: Document Vargas Scale and appropriate interventions in the flowsheet.   Outcome: Progressing Towards Goal  Note: Pressure Injury Interventions:  Sensory Interventions: Assess changes in LOC         Activity Interventions: Assess need for specialty bed,Chair cushion,Increase time out of bed    Mobility Interventions: Assess need for specialty bed,Chair cushion    Nutrition Interventions: Offer support with meals,snacks and hydration    Friction and Shear Interventions: Apply protective barrier, creams and emollients,Minimize layers                Problem: Patient Education: Go to Patient Education Activity  Goal: Patient/Family Education  Outcome: Progressing Towards Goal

## 2022-07-13 NOTE — PROGRESS NOTES
1350  TR Band removed per protocol. Quikclot and tegaderm applied. Dressing clean, dry and intact. Patient educated on signs and symptoms of bleeding. Patient verbalized understanding. No complaints at this time. [address] handoff provided to nurse on CVT stepdown unitLiza. SBAR provided. All questions answered.

## 2022-07-13 NOTE — PROGRESS NOTES
Received pt from cath lab. Vitals stable (see flowsheets). Pt A+Ox4, denies pain. Pt on heparin drip at 12units and nitro drip at 5mcg; double verified with second RN. R radial cath site intact, pt denies numbness, tingling, or pain. Absent hematoma. Sensation present. Family at bedside. Will continue to monitor. Wound Prevention Checklist    Patient: Tamara Wells (81 y.o. female)  Date: 7/13/2022  Diagnosis: NSTEMI (non-ST elevated myocardial infarction) ([facility] [address] [I21.4] <principal problem not specified>    Precautions:         [x]  Heel prevention boots placed on patient    [x]  Patient turned q2h during shift    []  Lift team ordered    [x]  Patient on [place] bed/Specialty bed    []  Each Wound is documented during shift (Stage, Color, drainage, odor, measurements, and dressings)    [x]  Dual skin checks done at bedside during shift report with khang . No wound present.      Andriy Garrido RN

## 2022-07-13 NOTE — CONSULTS
Cardiology Initial Patient Referral Note    Cardiology referral request from [place], [place] for evaluation and management/treatment of chest pain    Date of  Admission: 2022 10:25 AM   Primary Care Physician:  None    Attending Cardiologist: Dr. Lonnell Kussmaul    Patient seen and independently examined. Chart reviewed including EKGs, chest x-ray and all labs. Agree with below with the following comments: Patient's presentation consistent with an acute NSTEMI. EKG demonstrates anteroseptal Q waves which may just be secondary to her body habitus. There are no acute ST-T abnormalities. Her initial troponin was already elevated at 1100 which is consistent with her symptom onset last night. She is still having some mild chest discomfort despite starting IV heparin. IV nitroglycerin has been ordered. Would also continue scheduled aspirin, potent statin and a low-dose beta-blocker as long as her blood pressure and heart rate will tolerate. Patient scheduled for cardiac catheterization either later today or tomorrow morning depending on scheduling. Would keep n.p.o. for now. Discussed cardiac catheterization at length with patient who is willing to proceed with procedure when scheduled. Would continue to trend cardiac biomarkers and check serial EKGs as well. Echocardiogram has been ordered. Joon Sepulveda MD     Assessment:     -NSTEMI. Given  mg and SL NTG x 1 at Urgent Care.  -Tobacco abuse, approx. 20 pack-year smoking history  -Daughter  of MI last year. No primary cardiologist, initial referral completed by Dr. Lyle Freshwater:     -Will start low-dose nitrolgycerin infusion.   -Will keep NPO for consideration of cardiac catheterization this afternoon. Details of procedure, risks/benefits, alternatives discussed with pt and daughter at bedside. All questions answered. They are in agreement with proceeding with cardiac catheterization.   Timing based on response to medical therapy.  -Will start low-dose nitroglycerin infusion.  -Continue Heparin infusion.  -Pt given 324 mg ASA at urgent care, start 81 mg ASA tomorrow.  -Will check Echocardiogram for completeness.  -Further recommendations based on test results, hospital course. History of Present Illness: This is a 57 y.o. female admitted for NSTEMI (non-ST elevated myocardial infarction) ([facility] [address] [I21.4]. Patient complains of: chest pain      Dulce Serrato is a 57 y.o. female who presented to the hospital due to chest pain. Pt reports onset of cramping left lower chest pain while at [facility] at around 20:00 last night. She reports that she developed some sweating when she got home around 22:30. She reports she was unable to get comfortable/sleep last night due to constant nature of pain. She reports that her pain is worse if lying on her R side and notes that her pain improved with SL NTG given at [facility] (second SL NTG given in ER did not provide any relief). She reports that she developed numbness in her L arm at around 05:30 this morning, constant, and notes that she kept having to sit back down this morning because of her pain. She denies associated shortness of breath or nausea. Cardiac risk factors: smoking/ tobacco exposure      Review of Symptoms:  Except as stated above include:  Constitutional:  negative  Respiratory:  negative  Cardiovascular:  As per HPI  Gastrointestinal: negative  Genitourinary:  negative  Musculoskeletal:  Negative  Neurological:  Negative  Dermatological:  Negative  Endocrinological: Negative  Psychological:  Negative     Past Medical History:   No past medical history on file.       Social History:     Social History     Socioeconomic History    Marital status: SINGLE   Tobacco Use    Smoking status: Current Every Day Smoker     Packs/day: 0.50   Substance and Sexual Activity    Alcohol use: No    Drug use: No    Sexual activity: Yes     Birth control/protection: None Family History:   No family history on file. Medications:   No Known Allergies     Current Facility-Administered Medications   Medication Dose Route Frequency    heparin (porcine) 25,000 units in 0.45% saline 250 ml infusion  12-25 Units/kg/hr IntraVENous TITRATE    nitroglycerin (NITROSTAT) tablet 0.4 mg  0.4 mg SubLINGual PRN    nitroGLYcerin (TRIDIL) 400 mcg/ml infusion  5 mcg/min IntraVENous CONTINUOUS    metoprolol tartrate (LOPRESSOR) tablet 12.5 mg  12.5 mg Oral Q12H     Current Outpatient Medications   Medication Sig    ibuprofen (MOTRIN) 600 mg tablet Take 1 Tab by mouth every six (6) hours as needed for Pain.  cyclobenzaprine (FLEXERIL) 10 mg tablet Take 1 Tab by mouth three (3) times daily as needed for Muscle Spasm(s).  acetaminophen (TYLENOL) 325 mg tablet Take 2 Tabs by mouth every four (4) hours as needed for Pain. Physical Exam:     Visit Vitals  /68   Pulse 89   Temp 98 °F (36.7 °C)   Resp 28   Wt 61.7 kg (136 lb)   SpO2 100%   BMI 24.87 kg/m²       TELE: SR with PVC's    BP Readings from Last 3 Encounters:   07/13/22 120/68   01/01/20 104/65   11/16/19 111/68     Pulse Readings from Last 3 Encounters:   07/13/22 89   01/01/20 80   11/16/19 76     Wt Readings from Last 3 Encounters:   07/13/22 61.7 kg (136 lb)   10/11/17 49.9 kg (110 lb)   10/08/12 53.1 kg (117 lb)       General:  alert, cooperative, no distress, appears stated age  Neck:  supple  Lungs:  clear to auscultation bilaterally  Heart:  regular rate and rhythm  Abdomen:  abdomen is soft without significant tenderness, masses, organomegaly or guarding  Extremities:  atraumatic, no cyanosis or edema  Skin: Warm and dry.    Neuro: alert, oriented x3, affect appropriate, no focal neurological deficits, moves all extremities well, no involuntary movements  Psych: non focal     Data Review:     Recent Labs     07/13/22  1031   WBC 9.8   HGB 13.4   HCT 39.4        Recent Labs     07/13/22  1031      K 3.9    CO2 27   *   BUN 11   CREA 0.67   CA 9.3   MG 1.9   ALB 4.1   ALT 12*         Signed By: Cynthia Chase PA-C     July 13, 2022

## 2022-07-13 NOTE — ED NOTES
Pt in bed resting quietly. New IV started. Call light within reach. Pt has no further request or complaints at this time.

## 2022-07-13 NOTE — ED TRIAGE NOTES
Pt arrived with c/o of chest pain that started last night. Pt initally thought it was indigestion. Pt was seen at [facility] this morning and was sent over to this ED for \"irregular heart beat. \" Pt received 1 nitro and 324 mg of aspirin at [facility].

## 2022-07-14 ENCOUNTER — APPOINTMENT (OUTPATIENT)
Dept: VASCULAR SURGERY | Age: 62
DRG: 234 | End: 2022-07-14
Attending: PHYSICIAN ASSISTANT
Payer: COMMERCIAL

## 2022-07-14 LAB
APTT PPP: 80.8 SEC (ref 23–36.4)
BASOPHILS # BLD: 0 K/UL (ref 0–0.1)
BASOPHILS NFR BLD: 0 % (ref 0–2)
CHOLEST SERPL-MCNC: 127 MG/DL
DIFFERENTIAL METHOD BLD: ABNORMAL
EOSINOPHIL # BLD: 0.1 K/UL (ref 0–0.4)
EOSINOPHIL NFR BLD: 2 % (ref 0–5)
ERYTHROCYTE [DISTWIDTH] IN BLOOD BY AUTOMATED COUNT: 12.4 % (ref 11.6–14.5)
HCT VFR BLD AUTO: 35.4 % (ref 35–45)
HDLC SERPL-MCNC: 43 MG/DL (ref 40–60)
HDLC SERPL: 3 {RATIO} (ref 0–5)
HGB BLD-MCNC: 12 G/DL (ref 12–16)
HISTORY CHECKED?,CKHIST: NORMAL
IMM GRANULOCYTES # BLD AUTO: 0 K/UL (ref 0–0.04)
IMM GRANULOCYTES NFR BLD AUTO: 0 % (ref 0–0.5)
LDLC SERPL CALC-MCNC: 73.8 MG/DL (ref 0–100)
LIPID PROFILE,FLP: NORMAL
LYMPHOCYTES # BLD: 2.5 K/UL (ref 0.9–3.6)
LYMPHOCYTES NFR BLD: 34 % (ref 21–52)
MCH RBC QN AUTO: 31 PG (ref 24–34)
MCHC RBC AUTO-ENTMCNC: 33.9 G/DL (ref 31–37)
MCV RBC AUTO: 91.5 FL (ref 78–100)
MONOCYTES # BLD: 0.6 K/UL (ref 0.05–1.2)
MONOCYTES NFR BLD: 9 % (ref 3–10)
NEUTS SEG # BLD: 4 K/UL (ref 1.8–8)
NEUTS SEG NFR BLD: 55 % (ref 40–73)
NRBC # BLD: 0 K/UL (ref 0–0.01)
NRBC BLD-RTO: 0 PER 100 WBC
PLATELET # BLD AUTO: 233 K/UL (ref 135–420)
PMV BLD AUTO: 10.9 FL (ref 9.2–11.8)
RBC # BLD AUTO: 3.87 M/UL (ref 4.2–5.3)
TRIGL SERPL-MCNC: 51 MG/DL (ref ?–150)
TROPONIN-HIGH SENSITIVITY: 2151 NG/L (ref 0–54)
VLDLC SERPL CALC-MCNC: 10.2 MG/DL
WBC # BLD AUTO: 7.4 K/UL (ref 4.6–13.2)

## 2022-07-14 PROCEDURE — 74011000250 HC RX REV CODE- 250: Performed by: INTERNAL MEDICINE

## 2022-07-14 PROCEDURE — 84484 ASSAY OF TROPONIN QUANT: CPT

## 2022-07-14 PROCEDURE — 86900 BLOOD TYPING SEROLOGIC ABO: CPT

## 2022-07-14 PROCEDURE — 99232 SBSQ HOSP IP/OBS MODERATE 35: CPT | Performed by: HOSPITALIST

## 2022-07-14 PROCEDURE — 85730 THROMBOPLASTIN TIME PARTIAL: CPT

## 2022-07-14 PROCEDURE — 65660000004 HC RM CVT STEPDOWN

## 2022-07-14 PROCEDURE — 93880 EXTRACRANIAL BILAT STUDY: CPT

## 2022-07-14 PROCEDURE — 85025 COMPLETE CBC W/AUTO DIFF WBC: CPT

## 2022-07-14 PROCEDURE — 99232 SBSQ HOSP IP/OBS MODERATE 35: CPT | Performed by: INTERNAL MEDICINE

## 2022-07-14 PROCEDURE — 86923 COMPATIBILITY TEST ELECTRIC: CPT

## 2022-07-14 PROCEDURE — 74011250637 HC RX REV CODE- 250/637: Performed by: PHYSICIAN ASSISTANT

## 2022-07-14 PROCEDURE — 74011250637 HC RX REV CODE- 250/637: Performed by: INTERNAL MEDICINE

## 2022-07-14 PROCEDURE — 2709999900 HC NON-CHARGEABLE SUPPLY

## 2022-07-14 PROCEDURE — 99223 1ST HOSP IP/OBS HIGH 75: CPT | Performed by: PHYSICIAN ASSISTANT

## 2022-07-14 PROCEDURE — 74011000250 HC RX REV CODE- 250: Performed by: PHYSICIAN ASSISTANT

## 2022-07-14 PROCEDURE — 36415 COLL VENOUS BLD VENIPUNCTURE: CPT

## 2022-07-14 PROCEDURE — 80061 LIPID PANEL: CPT

## 2022-07-14 RX ORDER — SODIUM CHLORIDE 9 MG/ML
500 INJECTION, SOLUTION INTRAVENOUS CONTINUOUS
Status: DISCONTINUED | OUTPATIENT
Start: 2022-07-15 | End: 2022-07-16 | Stop reason: SDUPTHER

## 2022-07-14 RX ORDER — AMIODARONE HYDROCHLORIDE 200 MG/1
400 TABLET ORAL
Status: DISCONTINUED | OUTPATIENT
Start: 2022-07-14 | End: 2022-07-15

## 2022-07-14 RX ORDER — SODIUM CHLORIDE 0.9 % (FLUSH) 0.9 %
5-40 SYRINGE (ML) INJECTION AS NEEDED
Status: DISCONTINUED | OUTPATIENT
Start: 2022-07-14 | End: 2022-07-25 | Stop reason: HOSPADM

## 2022-07-14 RX ORDER — MUPIROCIN 20 MG/G
OINTMENT TOPICAL 2 TIMES DAILY
Status: DISCONTINUED | OUTPATIENT
Start: 2022-07-14 | End: 2022-07-15

## 2022-07-14 RX ORDER — AMIODARONE HYDROCHLORIDE 200 MG/1
200 TABLET ORAL
Status: DISCONTINUED | OUTPATIENT
Start: 2022-07-16 | End: 2022-07-15

## 2022-07-14 RX ORDER — SODIUM CHLORIDE 0.9 % (FLUSH) 0.9 %
5-40 SYRINGE (ML) INJECTION EVERY 8 HOURS
Status: DISCONTINUED | OUTPATIENT
Start: 2022-07-14 | End: 2022-07-15

## 2022-07-14 RX ADMIN — SODIUM CHLORIDE, PRESERVATIVE FREE 10 ML: 5 INJECTION INTRAVENOUS at 23:39

## 2022-07-14 RX ADMIN — AMIODARONE HYDROCHLORIDE 400 MG: 200 TABLET ORAL at 17:01

## 2022-07-14 RX ADMIN — DOCUSATE SODIUM 100 MG: 100 CAPSULE, LIQUID FILLED ORAL at 17:01

## 2022-07-14 RX ADMIN — MUPIROCIN: 20 OINTMENT TOPICAL at 13:13

## 2022-07-14 RX ADMIN — DOCUSATE SODIUM 100 MG: 100 CAPSULE, LIQUID FILLED ORAL at 08:41

## 2022-07-14 RX ADMIN — MUPIROCIN: 20 OINTMENT TOPICAL at 17:03

## 2022-07-14 RX ADMIN — SODIUM CHLORIDE, PRESERVATIVE FREE 10 ML: 5 INJECTION INTRAVENOUS at 13:25

## 2022-07-14 RX ADMIN — AMIODARONE HYDROCHLORIDE 400 MG: 200 TABLET ORAL at 13:13

## 2022-07-14 RX ADMIN — METOPROLOL TARTRATE 12.5 MG: 25 TABLET, FILM COATED ORAL at 23:35

## 2022-07-14 RX ADMIN — ASPIRIN 81 MG CHEWABLE TABLET 81 MG: 81 TABLET CHEWABLE at 08:41

## 2022-07-14 RX ADMIN — SODIUM CHLORIDE, PRESERVATIVE FREE 10 ML: 5 INJECTION INTRAVENOUS at 07:38

## 2022-07-14 RX ADMIN — ATORVASTATIN CALCIUM 40 MG: 40 TABLET, FILM COATED ORAL at 23:35

## 2022-07-14 RX ADMIN — METOPROLOL TARTRATE 12.5 MG: 25 TABLET, FILM COATED ORAL at 08:41

## 2022-07-14 NOTE — CONSULTS
[facility]  -  Consult  7/14/2022    Mendez West is a 57 y.o. female who is being seen on consult for CAD, at  Dr. Ene Ochoa request.    Assessment:    3vessel CAD   NSTEMI   Tobacco use    Cardiac risk factors:  smoking/ tobacco exposure, family history    Plan:     1. Would benefit from CABG and patient is agreeable. (See STS Risk score at bottom of note.) Scheduled for CABG tomorrow 0800 am  2. Will also need the following tests to complete the workup and risk stratification.  COVID rule out   Type and Cross 2 units PRBC   Urinalysis   Carotid duplex scanning   Room air arterial blood gas   Start Amiodarone for Atrial Fibrillation prophylaxis    Subjective:     Chief Complaint   Patient presents with    Chest Pain       History of Present Illness:   Mendez West is a 57 y.o. female with history of tobacco use who presented with chest pain. The chest pain started 2 nights ago. It  is severe and is located substernally without radiation. She thought it was indigestion and went to [facility] who sent her to [facility] ED. She ruled in for NSTEMI with troponin over 1,000. The pain occured at rest.  She notes that she has been having some chest pain for the last 3 months that subsides with rest and doesn't last long. She  denies claudication, dizziness, dyspnea, dyspnea on exertion, fatigue, irregular heart beats, lower extremity edema, near-syncope, orthopnea, palpitations, paroxysmal nocturnal dyspnea, syncope, tachypnea. Seen with Dr. Fely De Jesus. Currently, she denies CP or SOB on IV Hep and NTG. Past Medical History:   Diagnosis Date    Arthritis      Arthritis of hands    Past Surgical History:   Procedure Laterality Date    HX PARTIAL HYSTERECTOMY         Social History:   She  reports that she has been smoking. She has been smoking about 0.50 packs per day. She does not have any smokeless tobacco history on file. She  reports no history of alcohol use. Family History:     Family History   Problem Relation Age of Onset    Heart Disease Maternal Grandmother     Heart Attack Daughter           Allergies and Intolerances:   No Known Allergies    Home Medications:     Current Facility-Administered Medications   Medication Dose Route Frequency Provider Last Rate Last Admin    nitroglycerin (NITROSTAT) tablet 0.4 mg  0.4 mg SubLINGual PRN Jama Alas MD   0.4 mg at 07/13/22 1132    metoprolol tartrate (LOPRESSOR) tablet 12.5 mg  12.5 mg Oral Q12H Jama Alas MD   12.5 mg at 07/14/22 0841    nicotine (NICODERM CQ) 14 mg/24 hr patch 1 Patch  1 Patch TransDERmal DAILY Jama Alas MD   1 Patch at 07/14/22 0841    sodium chloride (NS) flush 5-40 mL  5-40 mL IntraVENous Q8H Jama Alas MD   10 mL at 07/14/22 0738    sodium chloride (NS) flush 5-40 mL  5-40 mL IntraVENous PRN Jama Alas MD        magnesium hydroxide (MILK OF MAGNESIA) 400 mg/5 mL oral suspension 30 mL  30 mL Oral DAILY PRN Jama Alas MD        docusate sodium (COLACE) capsule 100 mg  100 mg Oral BID Jama Alas MD   100 mg at 07/14/22 0841    atorvastatin (LIPITOR) tablet 40 mg  40 mg Oral QHS Ricardo Lomeli MD   40 mg at 07/13/22 2155    aspirin chewable tablet 81 mg  81 mg Oral DAILY Ricardo Lomeli MD   81 mg at 07/14/22 0841    heparin (porcine) 25,000 units in 0.45% saline 250 ml infusion  12-25 Units/kg/hr IntraVENous CONTINUOUS Jama Alas MD 6.5 mL/hr at 07/14/22 0736 12 Units/kg/hr at 07/14/22 0736    nitroGLYcerin (TRIDIL) 400 mcg/ml infusion  5 mcg/min IntraVENous CONTINUOUS Jama Alas MD 0.8 mL/hr at 07/14/22 0737 5 mcg/min at 07/14/22 0737     Prior to Admission Medications   Prescriptions Last Dose Informant Patient Reported? Taking?   acetaminophen (TYLENOL) 325 mg tablet Unknown at Unknown time  No No   Sig: Take 2 Tabs by mouth every four (4) hours as needed for Pain.    cyclobenzaprine (FLEXERIL) 10 mg tablet Unknown at Unknown time  No No   Sig: Take 1 Tab by mouth three (3) times daily as needed for Muscle Spasm(s). ibuprofen (MOTRIN) 600 mg tablet Unknown at Unknown time  No No   Sig: Take 1 Tab by mouth every six (6) hours as needed for Pain. Facility-Administered Medications: None      No current outpatient medications on file.         Review of Systems: (NEGATIVE unless checked or in HPI.)   Cardiac:   [] Chest pain or chest pressure  [] Shortness of breath upon activity  [] Shortness of breath when lying flat  [] Irregular heart rhythm     Vascular:   [] Pain in calf, thigh, or hip brought on by walking  [] Pain in feet at night that wakes you up from your sleep  [] Blood clot in your veins  [] Leg swelling  [] bulging leg veins     Pulmonary:   [] Oxygen at home  [] Productive cough  [] Wheezing     Neurologic:   [] Sudden weakness in arms or legs  [] Sudden numbness in arms or legs  [] Sudden onset of difficult speaking or slurred speech  [] Temporary loss of vision in one eye  [] Problems with dizziness    Gastrointestinal:   [] Blood in stool  [] Vomited blood    Genitourinary:   [] Burning when urinating  [] Blood in urine     Psychiatric:   [] Major depression     Hematologic:   [] Bleeding problems  [] Problems with blood clotting  [] bulging leg veins  [] calf pain with exertion    Dermatologic:   [] Rashes or ulcers     Constitutional:   [] Fever or chills  [] weight gain or loss     Ear/Nose/Throat:   [] Dentition   [] Change in hearing  [] Nose bleeds  [] Sore throat     Musculoskeletal:   [] Back pain  [x] Joint pain  [] Muscle pain    Physical Examination:     Visit Vitals  /71   Pulse (!) 59   Temp 97.8 °F (36.6 °C)   Resp 18   Ht 5' 2\" (1.575 m)   Wt 52.2 kg (115 lb 1.6 oz)   SpO2 100%   BMI 21.05 kg/m²     PHYSICAL EXAMINATION:  Vital signs:   BP Readings from Last 3 Encounters:   22 135/71   20 104/65   19 111/68     Temp (24hrs), Av.8 °F (36.6 °C), Min:97.6 °F (36.4 °C), Max:98.1 °F (36.7 °C)    Wt Readings from Last 3 Encounters:   07/14/22 52.2 kg (115 lb 1.6 oz)   10/11/17 49.9 kg (110 lb)   10/08/12 53.1 kg (117 lb)     Ht Readings from Last 3 Encounters:   07/13/22 5' 2\" (1.575 m)   10/11/17 5' 2\" (1.575 m)   10/08/12 5' 2\" (1.575 m)     Body surface area is 1.51 meters squared. General:   awake alert and oriented   Skin:   no rashes or skin lesions noted on limited exam   HEENT:  Normocephalic, atraumatic, PERRL, EOMI, no scleral icterus or pallor; no conjunctival hemmohage;  nasal and oral mucous are moist and without evidence of lesions. No thrush. Dentition good Neck supple, no bruits. Lymph Nodes:   no cervical, axillary or inguinal adenopathy   Lungs:   non-labored, bilaterally clear to aspiration- no crackles wheezes rales or rhonchi   Heart:  RRR, s1 and s2; no murmurs rubs or gallops, no edema, + pedal pulses   Abdomen:  soft, non-distended, active bowel sounds, no hepatomegaly, no splenomegaly. Appropriate surgical scars for stated surgeries. Non-tender   Genitourinary:  deferred   Extremities:   no clubbing, cyanosis; no joint effusions or swelling; Full ROM of all large joints to the upper and lower extremities; muscle mass appropriate for age   Neurologic:  No gross focal sensory abnormalities; 5/5 muscle strength to upper and lower extremities. Speech appropirate. Cranial nerves intact   Psychiatric:   appropriate and interactive.        Laboratory Data:     Lab Results   Component Value Date/Time    WBC 7.4 07/14/2022 05:39 AM    HGB 12.0 07/14/2022 05:39 AM    HCT 35.4 07/14/2022 05:39 AM    PLATELET 013 53/16/3515 05:39 AM     Lab Results   Component Value Date/Time    Sodium 137 07/13/2022 04:52 PM    Potassium 4.0 07/13/2022 04:52 PM    Chloride 107 07/13/2022 04:52 PM    CO2 25 07/13/2022 04:52 PM    Glucose 94 07/13/2022 04:52 PM    BUN 10 07/13/2022 04:52 PM    Creatinine 0.63 07/13/2022 04:52 PM     Lab Results   Component Value Date/Time Cholesterol, total 127 07/14/2022 05:39 AM    HDL Cholesterol 43 07/14/2022 05:39 AM    LDL, calculated 73.8 07/14/2022 05:39 AM    Triglyceride 51 07/14/2022 05:39 AM     Lab Results   Component Value Date/Time    Hemoglobin A1c 5.9 (H) 07/13/2022 10:31 AM     Recent Labs     07/13/22  1652 07/13/22  1031   CA 8.9 9.3   ALB 3.5 4.1   TP 7.6 8.3*   TBILI 1.0 0.6     ABG:  No results found for: PH, PHI, PCO2, PCO2I, PO2, PO2I, HCO3, HCO3I, FIO2, FIO2I  No results for input(s): TROIQ, CPK, CKMB in the last 72 hours. Lab Results   Component Value Date/Time    TSH 0.68 07/13/2022 04:52 PM       EKG: (independently reviewed)   EKG Results     Procedure 720 Value Units Date/Time    EKG, 12 LEAD, INITIAL [[id number]] Collected: 07/13/22 1018    Order Status: Completed Updated: 07/13/22 2246     Ventricular Rate 78 BPM      Atrial Rate 63 BPM      P-R Interval 142 ms      QRS Duration 92 ms      Q-T Interval 392 ms      QTC Calculation (Bezet) 446 ms      Calculated P Axis 80 degrees      Calculated R Axis -30 degrees      Calculated T Axis 63 degrees      Diagnosis --     Sinus rhythm with occasional premature ventricular complexes  Possible Left atrial enlargement  Left axis deviation  Left ventricular hypertrophy  Cannot rule out Septal infarct , age undetermined  Abnormal ECG  No previous ECGs available  Confirmed by Gregoria Michaels M.D., Misael Francis ([id number]) on 7/13/2022 10:46:36 PM      EKG, 12 LEAD, INITIAL [[id number]]     Order Status: Sent         ECHO:   07/13/22    ECHO ADULT COMPLETE 07/13/2022 7/13/2022    Interpretation Summary    Left Ventricle: Normal left ventricular systolic function with a visually estimated EF of 55 - 60%. Left ventricle size is normal. Normal wall thickness. Normal wall motion. Normal diastolic function.   Mitral Valve: Mildly thickened leaflet. Mild regurgitation.     Signed by: Renzo Weber MD on 7/13/2022  3:17 PM      CATHETERIZATION:   07/13/22    CARDIAC PROCEDURE 07/13/2022 7/13/2022  Signed by: Janett William MD on 7/13/2022  3:54 PM      RADIOLOGY DATA: (images independently reviewed)    XR Results (most recent):  Results from Hospital Encounter encounter on 07/13/22    XR CHEST PORT    Narrative  HISTORY: Shortness of breath. Exam: Chest.    Technique: Single view portable upright chest. 11/14/2006. FINDINGS: There is no pneumothorax, pneumonia or pleural effusions. Heart and  mediastinal structures are unchanged. Bony thorax and soft tissues are  unchanged. Impression  IMPRESSION:  1. No acute cardiopulmonary process. No change. PFT:      PF Readings from Last 2 Encounters:   No data found for PF       STS RISK:   STS Adult Cardiac Surgery Database Version 4.20  RISK SCORES  Procedure: Isolated CAB  Risk of Mortality:  1.286%  Renal Failure:  0.671%  Permanent Stroke:  1.247%  Prolonged Ventilation:  5.455%  DSW Infection:  0.084%  Reoperation:  1.861%  Morbidity or Mortality:  8.273%  Short Length of Stay:  52.700%  Long Length of Stay:  3.041%      Fareed Linder PA-C    PLEASE NOTE:  This document has been produced using voice recognition software. Unrecognized errors in transcription may be present. NOTE TO PATIENT:  The purpose of this note is to communicate optimally with the other providers involved in your care. It is written using standard medical terminology. If you have questions regarding details of the note please call my office at [phone] and make an appointment to discuss your concerns.

## 2022-07-14 NOTE — MED STUDENT NOTES
*ATTENTION:  This note has been created by a medical student for educational purposes only. Please do not refer to the content of this note for clinical decision-making, billing, or other purposes. Please see attending physicians note to obtain clinical information on this patient. *       Student Progress Note  Please refer to attendings daily rounding note for full details      Patient: Óscar Donato MRN: [id number]  CSN: [id number]    YOB: 1960  Age: 57 y.o. Sex: female    DOA: 2022 LOS:  LOS: 1 day          Chief Complaint: chest pain     Assessment/ Plan:     1. NSTEMI -- patient had elevated troponin levels without evidence of ST elevation and severe chest pain; continue aspirin, metoprolol, atorvastatin, heparin and nitroglycerin; start losartan; discuss lifestyle modifications; look to D/C today    2. Tobacco use disorder --  patient on smoking cessation and offer alternatives to tobacco use, ie nicotine patch      HPI:     Óscar Donaot is a 57 y.o. female with a hx of chronic tobacco use who presents with chest pain that started 2 days ago. She described the pain as a \"cramp\" that was localized to the center of the chest and did not radiate anywhere. She had experienced chest pain before, but it had never been like this. She is currently not in any pain following her catheterization procedure and new medications. The patient denies sweating, palpitations, edema and fever.          Medical History:        Past Medical History  - chronic tobacco use  - Hepatitis C -- currently resolved    Surgical History  Partial hysterectomy -- 16 years ago    Family History  Oldest daughter --  at 39years old from heart complications (not specified)  Younger daughter -- HTN    Social History  Single, does shipyard repair, smokes 1/2 PPD for the past 40 years and continues to smoke, quit alcohol and cocaine use 28 years ago    Home medications  - Ibuprofen 600 mg tablet by mouth every 6 hours as needed  - Cyclobenzaprine 10 mg tablet by mouth 3 times per day as needed for muscle spasms  - Acetaminophen 325 mg tablet - 2 tablets by mouth every 4 hours as needed for pain    Allergies  none    Review of Systems:      Gen:  Denies weight loss and fatigue  HEENT: denies headache and vision changes  Cardiovascular: see HPI  Pulm: reports occasional cough, denies wheezing and pleuritic pain  GI: denies vomiting, diarrhea and constipation  :  Reports increased urinary frequency, denies urinary pain and urgency  Musculoskeletal: denies muscle or joint pain, reports joint swelling in right hand  Skin: denies skin changes and itching  Neuro: denies dizziness, reports tingling in hands and legs occasionally    Physical Exam:      Visit Vitals  /66   Pulse 60   Temp 97.8 °F (36.6 °C)   Resp 16   Ht 5' 2\" (1.575 m)   Wt 52.2 kg (115 lb 1.6 oz)   SpO2 98%   BMI 21.05 kg/m²         Physical Exam:  Gen: well appearing female laying in bed in no apparent distress  HEENT:   Pupils equal and reactive, EOMI  CV:  Regular rate and rhythm, normal S1, S2, no murmurs, rubs or gallops  Resp:  Lungs clear to auscultation, no wheezing, rales or rhonic  MSK: swelling of right ring finger, otherwise no joint tenderness  Skin: no erythema, ecchymosis; right wrist catheter site covered by bandage on inspection; no swelling  Neuro: alert and oriented x3      I have reviewed the patient's Labs and Radiology studies. Chest XR findings: no abnormalities    Cardiac left catheterization procedure findings:  Left Anterior Descending   Angiography calcification noted. Mid LAD has a diffuse up to 70-75% long disease. Distal vessel appeared approximately 2 mm in size without any significant obstructive disease. Diagonal branch: 99% ostial disease, bifurcating vessel. Approximately 1.5-1.75 mm vessel. Left Circumflex   Ostial and proximal 90% stenosis of native LCx.  High OM branch is 95-99% stenosis with faint forward flow. This vessel appears very small caliber OM 2 and OM 3: Small caliber vessel with luminal irregularities     Left Ventricle The left ventricular systolic function is normal. LV end diastolic pressure is normal. LV EDP is: 12. The estimated EF = grossly normal. There is no evidence of mitral regurgitation. Michael López  7/14/2022  9:40 AM      *ATTENTION:  This note has been created by a medical student for educational purposes only. Please do not refer to the content of this note for clinical decision-making, billing, or other purposes. Please see attending physicians note to obtain clinical information on this patient. *

## 2022-07-14 NOTE — PROGRESS NOTES
[facility] Hospitalist Group  Progress Note    Patient: Troy Falcon Age: 57 y.o. : 1960 MR#: [id number] SSN: [id number]  Date/Time: 2022     Subjective:     Patient seen and evaluated, lying in bed, no acute distress. 69-year-old female with a past medical history of chronic tobacco use, was admitted to the hospital secondary to NSTEMI. Patient underwent cardiac catheterization, showed LAD diffuse stenosis 70 -75% long disease. Diagonal branch 99% ostial disease, bifurcating vessel. Left circumflex ostial and proximal 90% stenosis of the native left circumflex, high OM branch 95 to 99% stenosis with faint forward flow. Patient evaluated by CT surgery and would benefit from CABG. Patient has been scheduled for CABG at 8 AM.      Assessment/Plan:     1. NSTEMI status postcardiac catheterization- results as above. Patient evaluated by CT surgery and would benefit from CABG. Patient has been scheduled for CABG at 8 AM.  Further lab testing as per CT surgery. DVT prophylaxis -Heparin GTT  Full code                Ulices Wells MD  22      Case discussed with:  []Patient  []Family  []Nursing  []Case Management  DVT Prophylaxis:  []Lovenox  []Hep SQ  []SCDs  []Coumadin   []On Heparin gtt    Objective:   VS:   Visit Vitals  /71   Pulse (!) 59   Temp 97.8 °F (36.6 °C)   Resp 18   Ht 5' 2\" (1.575 m)   Wt 51.8 kg (114 lb 1.4 oz)   SpO2 100%   BMI 20.87 kg/m²      Tmax/24hrs: Temp (24hrs), Av.8 °F (36.6 °C), Min:97.6 °F (36.4 °C), Max:98.1 °F (36.7 °C)  IOBRIEF    Intake/Output Summary (Last 24 hours) at 2022 1523  Last data filed at 2022 1316  Gross per 24 hour   Intake 814.9 ml   Output 300 ml   Net 514.9 ml       General:  Alert, cooperative, no acute distress    Pulmonary:  CTA Bilaterally. No Wheezing/Rhonchi/Rales. Cardiovascular: Regular rate and Rhythm. GI:  Soft, Non distended, Non tender. + Bowel sounds.   Extremities:  No edema, cyanosis, clubbing. No calf tenderness. Neurologic: Alert and oriented X 3. No acute neuro deficits.   Additional:    Medications:   Current Facility-Administered Medications   Medication Dose Route Frequency    sodium chloride (NS) flush 5-40 mL  5-40 mL IntraVENous Q8H    sodium chloride (NS) flush 5-40 mL  5-40 mL IntraVENous PRN    mupirocin (BACTROBAN) 2 % ointment   Both Nostrils BID    [START ON 7/15/2022] ceFAZolin (ANCEF) 2 g in sterile water (preservative free) 20 mL IV syringe  2 g IntraVENous ON CALL TO OR    [START ON 7/15/2022] 0.9% sodium chloride infusion 500 mL  500 mL IntraVENous CONTINUOUS    amiodarone (CORDARONE) tablet 400 mg  400 mg Oral TID WITH MEALS    Followed by   Lauren Blazing ON 7/16/2022] amiodarone (CORDARONE) tablet 200 mg  200 mg Oral TID WITH MEALS    [START ON 7/15/2022] aminocaproic acid (AMICAR) 10 g in 0.9% sodium chloride 500 mL infusion  1 g/hr IntraVENous CONTINUOUS    [START ON 7/15/2022] EPINEPHrine (ADRENALIN) 4 mg in 0.9% sodium chloride 250 mL infusion  1-10 mcg/min IntraVENous TITRATE    [START ON 7/15/2022] PHENYLephrine (CAMILA-SYNEPHRINE) 30 mg in 0.9% sodium chloride 250 mL infusion   mcg/min IntraVENous TITRATE    nitroglycerin (NITROSTAT) tablet 0.4 mg  0.4 mg SubLINGual PRN    metoprolol tartrate (LOPRESSOR) tablet 12.5 mg  12.5 mg Oral Q12H    nicotine (NICODERM CQ) 14 mg/24 hr patch 1 Patch  1 Patch TransDERmal DAILY    sodium chloride (NS) flush 5-40 mL  5-40 mL IntraVENous Q8H    sodium chloride (NS) flush 5-40 mL  5-40 mL IntraVENous PRN    magnesium hydroxide (MILK OF MAGNESIA) 400 mg/5 mL oral suspension 30 mL  30 mL Oral DAILY PRN    docusate sodium (COLACE) capsule 100 mg  100 mg Oral BID    atorvastatin (LIPITOR) tablet 40 mg  40 mg Oral QHS    aspirin chewable tablet 81 mg  81 mg Oral DAILY    heparin (porcine) 25,000 units in 0.45% saline 250 ml infusion  12-25 Units/kg/hr IntraVENous CONTINUOUS    nitroGLYcerin (TRIDIL) 400 mcg/ml infusion  5 mcg/min IntraVENous CONTINUOUS       Imaging:   XR Results (most recent):  Results from Hospital Encounter encounter on 07/13/22    XR CHEST PORT    Narrative  HISTORY: Shortness of breath. Exam: Chest.    Technique: Single view portable upright chest. 11/14/2006. FINDINGS: There is no pneumothorax, pneumonia or pleural effusions. Heart and  mediastinal structures are unchanged. Bony thorax and soft tissues are  unchanged. Impression  IMPRESSION:  1. No acute cardiopulmonary process. No change. CT Results (most recent):  No results found for this or any previous visit.      07/13/22    ECHO ADULT COMPLETE 07/13/2022 7/13/2022    Interpretation Summary    Left Ventricle: Normal left ventricular systolic function with a visually estimated EF of 55 - 60%. Left ventricle size is normal. Normal wall thickness. Normal wall motion. Normal diastolic function.   Mitral Valve: Mildly thickened leaflet. Mild regurgitation. Signed by: Marco Dickey MD on 7/13/2022  3:17 PM       MRI Results (most recent):  No results found for this or any previous visit.         Labs:    Recent Results (from the past 48 hour(s))   EKG, 12 LEAD, INITIAL    Collection Time: 07/13/22 10:18 AM   Result Value Ref Range    Ventricular Rate 78 BPM    Atrial Rate 63 BPM    P-R Interval 142 ms    QRS Duration 92 ms    Q-T Interval 392 ms    QTC Calculation (Bezet) 446 ms    Calculated P Axis 80 degrees    Calculated R Axis -30 degrees    Calculated T Axis 63 degrees    Diagnosis       Sinus rhythm with occasional premature ventricular complexes  Possible Left atrial enlargement  Left axis deviation  Left ventricular hypertrophy  Cannot rule out Septal infarct , age undetermined  Abnormal ECG  No previous ECGs available  Confirmed by Ula Gaucher, M.D., [address] [place] [address] [id number]) on 7/13/2022 10:46:36 PM     URINALYSIS W/ RFLX MICROSCOPIC    Collection Time: 07/13/22 10:26 AM   Result Value Ref Range    Color YELLOW      Appearance CLEAR Specific gravity 1.018 1.005 - 1.030      pH (UA) 6.5 5.0 - 8.0      Protein Negative NEG mg/dL    Glucose Negative NEG mg/dL    Ketone TRACE (A) NEG mg/dL    Bilirubin Negative NEG      Blood Negative NEG      Urobilinogen 2.0 (H) 0.2 - 1.0 EU/dL    Nitrites Negative NEG      Leukocyte Esterase Negative NEG     CBC WITH AUTOMATED DIFF    Collection Time: 07/13/22 10:31 AM   Result Value Ref Range    WBC 9.8 4.6 - 13.2 K/uL    RBC 4.28 4.20 - 5.30 M/uL    HGB 13.4 12.0 - 16.0 g/dL    HCT 39.4 35.0 - 45.0 %    MCV 92.1 78.0 - 100.0 FL    MCH 31.3 24.0 - 34.0 PG    MCHC 34.0 31.0 - 37.0 g/dL    RDW 12.3 11.6 - 14.5 %    PLATELET 076 677 - 588 K/uL    MPV 10.1 9.2 - 11.8 FL    NRBC 0.0 0  WBC    ABSOLUTE NRBC 0.00 0.00 - 0.01 K/uL    NEUTROPHILS 77 (H) 40 - 73 %    LYMPHOCYTES 15 (L) 21 - 52 %    MONOCYTES 8 3 - 10 %    EOSINOPHILS 0 0 - 5 %    BASOPHILS 0 0 - 2 %    IMMATURE GRANULOCYTES 0 0.0 - 0.5 %    ABS. NEUTROPHILS 7.5 1.8 - 8.0 K/UL    ABS. LYMPHOCYTES 1.5 0.9 - 3.6 K/UL    ABS. MONOCYTES 0.8 0.05 - 1.2 K/UL    ABS. EOSINOPHILS 0.0 0.0 - 0.4 K/UL    ABS. BASOPHILS 0.0 0.0 - 0.1 K/UL    ABS. IMM. GRANS. 0.0 0.00 - 0.04 K/UL    DF AUTOMATED     METABOLIC PANEL, COMPREHENSIVE    Collection Time: 07/13/22 10:31 AM   Result Value Ref Range    Sodium 136 136 - 145 mmol/L    Potassium 3.9 3.5 - 5.5 mmol/L    Chloride 104 100 - 111 mmol/L    CO2 27 21 - 32 mmol/L    Anion gap 5 3.0 - 18 mmol/L    Glucose 110 (H) 74 - 99 mg/dL    BUN 11 7.0 - 18 MG/DL    Creatinine 0.67 0.6 - 1.3 MG/DL    BUN/Creatinine ratio 16 12 - 20      GFR est AA >60 >60 ml/min/1.73m2    GFR est non-AA >60 >60 ml/min/1.73m2    Calcium 9.3 8.5 - 10.1 MG/DL    Bilirubin, total 0.6 0.2 - 1.0 MG/DL    ALT (SGPT) 12 (L) 13 - 56 U/L    AST (SGOT) 30 10 - 38 U/L    Alk.  phosphatase 69 45 - 117 U/L    Protein, total 8.3 (H) 6.4 - 8.2 g/dL    Albumin 4.1 3.4 - 5.0 g/dL    Globulin 4.2 (H) 2.0 - 4.0 g/dL    A-G Ratio 1.0 0.8 - 1.7 TROPONIN-HIGH SENSITIVITY    Collection Time: 07/13/22 10:31 AM   Result Value Ref Range    Troponin-High Sensitivity 1,177 (HH) 0 - 54 ng/L   NT-PRO BNP    Collection Time: 07/13/22 10:31 AM   Result Value Ref Range    NT pro- 0 - 900 PG/ML   MAGNESIUM    Collection Time: 07/13/22 10:31 AM   Result Value Ref Range    Magnesium 1.9 1.6 - 2.6 mg/dL   LIPID PANEL    Collection Time: 07/13/22 10:31 AM   Result Value Ref Range    LIPID PROFILE          Cholesterol, total 138 <200 MG/DL    Triglyceride 52 <150 MG/DL    HDL Cholesterol 44 40 - 60 MG/DL    LDL, calculated 83.6 0 - 100 MG/DL    VLDL, calculated 10.4 MG/DL    CHOL/HDL Ratio 3.1 0 - 5.0     HEMOGLOBIN A1C WITH EAG    Collection Time: 07/13/22 10:31 AM   Result Value Ref Range    Hemoglobin A1c 5.9 (H) 4.2 - 5.6 %    Est. average glucose 123 mg/dL   PTT    Collection Time: 07/13/22 11:27 AM   Result Value Ref Range    aPTT 33.3 23.0 - 36.4 SEC   TROPONIN-HIGH SENSITIVITY    Collection Time: 07/13/22 12:09 PM   Result Value Ref Range    Troponin-High Sensitivity 1,578 (HH) 0 - 54 ng/L   ECHO ADULT COMPLETE    Collection Time: 07/13/22  2:43 PM   Result Value Ref Range    IVSd 0.8 0.6 - 0.9 cm    LVIDd 3.8 (A) 3.9 - 5.3 cm    LVIDs 2.4 cm    LVOT Diameter 1.7 cm    LVPWd 0.7 0.6 - 0.9 cm    LVOT Peak Gradient 3 mmHg    LVOT Mean Gradient 2 mmHg    LVOT SV 43.8 ml    LVOT Peak Velocity 0.9 m/s    LVOT VTI 19.3 cm    RV Free Wall Peak S' 12 cm/s    LA Volume A/L 29 mL    LA Volume 2C 27 22 - 52 mL    LA Volume 4C 25 22 - 52 mL    MV A Velocity 0.78 m/s    MV E Wave Deceleration Time 166.3 ms    MV E Velocity 0.80 m/s    LV E' Lateral Velocity 8 cm/s    LV E' Septal Velocity 9 cm/s    TAPSE 2.0 1.7 cm    Aortic Root 2.5 cm    Fractional Shortening 2D 37 28 - 44 %    LVIDd Index 2.35 cm/m2    LVIDs Index 1.48 cm/m2    LV RWT Ratio 0.37     LV Mass 2D 78.8 67 - 162 g    LV Mass 2D Index 48.6 43 - 95 g/m2    MV E/A 1.03     E/E' Ratio (Averaged) 9.44     E/E' Lateral 10.00     E/E' Septal 8.89     LA Volume Index A/L 18 16 - 34 mL/m2    LVOT Stroke Volume Index 27.0 mL/m2    LVOT Area 2.3 cm2    LA Volume Index 2C 17 16 - 34 mL/m2    LA Volume Index 4C 15 (A) 16 - 34 mL/m2    Ao Root Index 6.65 cm/m2   METABOLIC PANEL, BASIC    Collection Time: 07/13/22  4:52 PM   Result Value Ref Range    Sodium 137 136 - 145 mmol/L    Potassium 4.0 3.5 - 5.5 mmol/L    Chloride 107 100 - 111 mmol/L    CO2 25 21 - 32 mmol/L    Anion gap 5 3.0 - 18 mmol/L    Glucose 94 74 - 99 mg/dL    BUN 10 7.0 - 18 MG/DL    Creatinine 0.63 0.6 - 1.3 MG/DL    BUN/Creatinine ratio 16 12 - 20      GFR est AA >60 >60 ml/min/1.73m2    GFR est non-AA >60 >60 ml/min/1.73m2    Calcium 8.9 8.5 - 10.1 MG/DL   HEPATIC FUNCTION PANEL    Collection Time: 07/13/22  4:52 PM   Result Value Ref Range    Protein, total 7.6 6.4 - 8.2 g/dL    Albumin 3.5 3.4 - 5.0 g/dL    Globulin 4.1 (H) 2.0 - 4.0 g/dL    A-G Ratio 0.9 0.8 - 1.7      Bilirubin, total 1.0 0.2 - 1.0 MG/DL    Bilirubin, direct 0.2 0.0 - 0.2 MG/DL    Alk.  phosphatase 59 45 - 117 U/L    AST (SGOT) 38 10 - 38 U/L    ALT (SGPT) 14 13 - 56 U/L   TSH 3RD GENERATION    Collection Time: 07/13/22  4:52 PM   Result Value Ref Range    TSH 0.68 0.36 - 3.74 uIU/mL   CBC W/O DIFF    Collection Time: 07/13/22  4:52 PM   Result Value Ref Range    WBC 8.0 4.6 - 13.2 K/uL    RBC 3.80 (L) 4.20 - 5.30 M/uL    HGB 11.8 (L) 12.0 - 16.0 g/dL    HCT 34.7 (L) 35.0 - 45.0 %    MCV 91.3 78.0 - 100.0 FL    MCH 31.1 24.0 - 34.0 PG    MCHC 34.0 31.0 - 37.0 g/dL    RDW 12.3 11.6 - 14.5 %    PLATELET 995 143 - 434 K/uL    MPV 11.3 9.2 - 11.8 FL    NRBC 0.0 0  WBC    ABSOLUTE NRBC 0.00 0.00 - 0.01 K/uL   PROTHROMBIN TIME + INR    Collection Time: 07/13/22  4:52 PM   Result Value Ref Range    Prothrombin time 14.8 11.5 - 15.2 sec    INR 1.1 0.8 - 1.2     PTT    Collection Time: 07/13/22 10:48 PM   Result Value Ref Range    aPTT 74.1 (H) 23.0 - 36.4 SEC   CBC WITH AUTOMATED DIFF Collection Time: 07/14/22  5:39 AM   Result Value Ref Range    WBC 7.4 4.6 - 13.2 K/uL    RBC 3.87 (L) 4.20 - 5.30 M/uL    HGB 12.0 12.0 - 16.0 g/dL    HCT 35.4 35.0 - 45.0 %    MCV 91.5 78.0 - 100.0 FL    MCH 31.0 24.0 - 34.0 PG    MCHC 33.9 31.0 - 37.0 g/dL    RDW 12.4 11.6 - 14.5 %    PLATELET 654 788 - 709 K/uL    MPV 10.9 9.2 - 11.8 FL    NRBC 0.0 0  WBC    ABSOLUTE NRBC 0.00 0.00 - 0.01 K/uL    NEUTROPHILS 55 40 - 73 %    LYMPHOCYTES 34 21 - 52 %    MONOCYTES 9 3 - 10 %    EOSINOPHILS 2 0 - 5 %    BASOPHILS 0 0 - 2 %    IMMATURE GRANULOCYTES 0 0.0 - 0.5 %    ABS. NEUTROPHILS 4.0 1.8 - 8.0 K/UL    ABS. LYMPHOCYTES 2.5 0.9 - 3.6 K/UL    ABS. MONOCYTES 0.6 0.05 - 1.2 K/UL    ABS. EOSINOPHILS 0.1 0.0 - 0.4 K/UL    ABS. BASOPHILS 0.0 0.0 - 0.1 K/UL    ABS. IMM.  GRANS. 0.0 0.00 - 0.04 K/UL    DF AUTOMATED     LIPID PANEL    Collection Time: 07/14/22  5:39 AM   Result Value Ref Range    LIPID PROFILE          Cholesterol, total 127 <200 MG/DL    Triglyceride 51 <150 MG/DL    HDL Cholesterol 43 40 - 60 MG/DL    LDL, calculated 73.8 0 - 100 MG/DL    VLDL, calculated 10.2 MG/DL    CHOL/HDL Ratio 3.0 0 - 5.0     PTT    Collection Time: 07/14/22  5:39 AM   Result Value Ref Range    aPTT 80.8 (H) 23.0 - 36.4 SEC   TROPONIN-HIGH SENSITIVITY    Collection Time: 07/14/22  9:44 AM   Result Value Ref Range    Troponin-High Sensitivity 2,151 (HH) 0 - 54 ng/L   DUPLEX CAROTID BILATERAL    Collection Time: 07/14/22  1:29 PM   Result Value Ref Range    Right cca dist sys 67.7 cm/s    Right CCA dist mclean 16.1 cm/s    RIGHT COMMON CAROTID ARTERY MID S 83.10 cm/s    RIGHT COMMON CAROTID ARTERY MID D 16.05 cm/s    Right CCA prox sys 82.0 cm/s    Right CCA prox mclean 13.9 cm/s    Right ICA dist sys 89.5 cm/s    Right ICA dist mclean 28.5 cm/s    Right ICA mid sys 75.6 cm/s    Right ICA mid mclean 21.5 cm/s    Right ICA prox sys 45.8 cm/s    Right ICA prox mclean 15.2 cm/s    Right eca sys 44.6 cm/s    RIGHT EXTERNAL CAROTID ARTERY D 5.64 cm/s    Right vertebral sys 55.5 cm/s    RIGHT VERTEBRAL ARTERY D 11.12 cm/s    Right subclavian prox PSV 81.4 cm/s    Right subclavian prox EDV 0.0 cm/s    Right ICA/CCA sys 1.3 no units    Left CCA dist sys 65.1 cm/s    Left CCA dist mclean 17.1 cm/s    LEFT COMMON CAROTID ARTERY MID S 75.81 cm/s    LEFT COMMON CAROTID ARTERY MID D 18.89 cm/s    Left CCA prox sys 96.5 cm/s    Left CCA prox mclean 18.9 cm/s    Left ICA dist sys 80.2 cm/s    Left ICA dist mclean 17.9 cm/s    Left ICA mid sys 81.0 cm/s    Left ICA mid mclean 24.9 cm/s    Left ICA prox sys 66.0 cm/s    Left ICA prox mclean 11.9 cm/s    Left ECA sys 55.4 cm/s    LEFT EXTERNAL CAROTID ARTERY D 13.21 cm/s    Left vertebral sys 48.0 cm/s    LEFT VERTEBRAL ARTERY D 11.97 cm/s    Left subclavian prox PSV 48.8 cm/s    Left subclavian prox EDV 0.0 cm/s    Left ICA/CCA sys 1.24 no units       Signed By: Kirill Bahena MD     July 14, 2022      I spent 25 minutes with the patient in face-to-face consultation, of which greater than 50% was spent in counseling and coordination of care as described above    Disclaimer: Sections of this note are dictated using utilizing voice recognition software. Minor typographical errors may be present. If questions arise, please do not hesitate to contact me or call our department.

## 2022-07-14 NOTE — PROGRESS NOTES
0800:  Counseled on smoking cessation, pt verbalizes understanding info. 1140:  Cardiac Surgery providers in room on consult with patient, her daughter is at bedside. RN is informed pt will have cardiac surgery tomorrow 7/15/22. Await further orders. 1400:  Appropriate return demonstration of Incentive Spirometer, now pulling 750ml. Height and standing scale weight documented. Vascular tech completed preop study at bedside. Daughter, Luis A Smith, and patient in room watching Pre Open Heart Video #1 and #2.   1900:  Shift change report given to JUMANA Milner with bedside rounds.

## 2022-07-14 NOTE — ROUTINE PROCESS
1930:Bedside and Verbal shift change report received from Medardo Wu RN(offgoing nurse). Report included the following information SBAR, Kardex, Intake/Output, MAR, Recent Results and Cardiac Rhythm SR w/ PVC's. Quietly resting in bed. HOB elevated. No SOB on RA. On Heparin gtt at 12 units/kg/hr or 6.5 ml/hr. Infusing well to Left AC. And on Nitroglycerin gtt at 5 mcg/min or 0.8 ml/hr. Infusing well to Right AC. Denies any pain or discomfort at this time. Call light within reach. 2155: Due meds given. 0003: No change from previous assessment     0324: No change from previous assessment. Assisted patient to bathroom & back in bed. Steady gait     0600: Slept good thru night. Needs attended. 4855: Bedside and Verbal shift change report given to [address] [place] [address] (oncoming nurse) by me (offgoing nurse). Report included the following information SBAR, Kardex, Intake/Output, MAR, Recent Results and Cardiac Rhythm SR w/ PVC's.

## 2022-07-14 NOTE — PROGRESS NOTES
Cardiology Progress Note    Admit Date: 2022  Attending Cardiologist: Dr. Voss Dress:     -NSTEMI. Given  mg and SL NTG x 1 at Urgent Care. Cardiac cath 2022 with findings as follows:  · LM: Angiographically normal.  · LAD: Mid LAD has diffuse up to 70-75% long disease. Distal vessel appeared approximately 2 mm in size without any significant obstructive disease. Diagonal branch with 99% ostial disease, bifurcating vessel, approximately 1.5-1.75 mm vessel. · LCx: Ostial and proximal 90% stenosis of native LCx. High OM branch is 95-99% stenosis with faint forward flow. This vessel appears very small caliber. OM2 and OM3: Small caliber vessel with luminal irregularities. · RCA: Proximal and mid diffuse disease. Distally severe disease. There is evidence of left to right collateral.  -hospitals 7572: Barbra Wadsworth LV systolic function with EF 55-60% with normal wall motion, normal LV size, normal wall thickness, normal diastolic function, no significant valvular disease.  -Tobacco abuse, approx. 20 pack-year smoking history  -Daughter  of MI last year.     No primary cardiologist, initial referral completed by Dr. Tahira Moreau:     Independently seen and evaluated. Agree with below. Patient has been seen by cardiothoracic surgery for possible surgical revascularization. Will defer to their decision making.    -Continue Heparin and nitroglycerin infusions, ASA, Lipitor, low-dose PO Lopressor.  -Cardiothoracic surgery consultation pending, assistance appreciated in advance. Subjective:     No new complaints. Denies chest pain.     Objective:      Patient Vitals for the past 8 hrs:   Temp Pulse Resp BP SpO2   22 0742 97.8 °F (36.6 °C) 60 16 127/66 98 %   22 0324 98.1 °F (36.7 °C) 63 20 (!) 142/85 97 %         Patient Vitals for the past 96 hrs:   Weight   22 0324 52.2 kg (115 lb 1.6 oz)   22 1702 54.4 kg (120 lb)   22 1330 61.7 kg (136 lb)   22 1117 61.7 kg (136 lb)                Current Facility-Administered Medications   Medication Dose Route Frequency Last Admin    nitroglycerin (NITROSTAT) tablet 0.4 mg  0.4 mg SubLINGual PRN 0.4 mg at 07/13/22 1132    metoprolol tartrate (LOPRESSOR) tablet 12.5 mg  12.5 mg Oral Q12H 12.5 mg at 07/14/22 0841    nicotine (NICODERM CQ) 14 mg/24 hr patch 1 Patch  1 Patch TransDERmal DAILY 1 Patch at 07/14/22 0841    sodium chloride (NS) flush 5-40 mL  5-40 mL IntraVENous Q8H 10 mL at 07/14/22 0738    sodium chloride (NS) flush 5-40 mL  5-40 mL IntraVENous PRN      magnesium hydroxide (MILK OF MAGNESIA) 400 mg/5 mL oral suspension 30 mL  30 mL Oral DAILY PRN      docusate sodium (COLACE) capsule 100 mg  100 mg Oral  mg at 07/14/22 0841    atorvastatin (LIPITOR) tablet 40 mg  40 mg Oral QHS 40 mg at 07/13/22 2155    aspirin chewable tablet 81 mg  81 mg Oral DAILY 81 mg at 07/14/22 0841    heparin (porcine) 25,000 units in 0.45% saline 250 ml infusion  12-25 Units/kg/hr IntraVENous CONTINUOUS 12 Units/kg/hr at 07/14/22 0736    nitroGLYcerin (TRIDIL) 400 mcg/ml infusion  5 mcg/min IntraVENous CONTINUOUS 5 mcg/min at 07/14/22 0737         Intake/Output Summary (Last 24 hours) at 7/14/2022 1039  Last data filed at 7/14/2022 0948  Gross per 24 hour   Intake 574.9 ml   Output 300 ml   Net 274.9 ml       Physical Exam:  General:  alert, cooperative, no distress, appears stated age  Neck:  supple  Lungs:  clear to auscultation bilaterally  Heart:  regular rate and rhythm  Abdomen:  abdomen is soft without significant tenderness, masses, organomegaly or guarding  Extremities:  atraumatic, no edema    Visit Vitals  /66   Pulse 60   Temp 97.8 °F (36.6 °C)   Resp 16   Ht 5' 2\" (1.575 m)   Wt 52.2 kg (115 lb 1.6 oz)   SpO2 98%   BMI 21.05 kg/m²       Data Review:     Labs: Results:       Chemistry Recent Labs     07/13/22  1652 07/13/22  1031   GLU 94 110*    136   K 4.0 3.9    104   CO2 25 27   BUN 10 11   CREA 0.63 0.67   CA 8.9 9.3   MG  --  1.9   AGAP 5 5   BUCR 16 16   AP 59 69   TP 7.6 8.3*   ALB 3.5 4.1   GLOB 4.1* 4.2*   AGRAT 0.9 1.0      CBC w/Diff Recent Labs     07/14/22 0539 07/13/22 1652 07/13/22  1031   WBC 7.4 8.0 9.8   RBC 3.87* 3.80* 4.28   HGB 12.0 11.8* 13.4   HCT 35.4 34.7* 39.4    243 263   GRANS 55  --  77*   LYMPH 34  --  15*   EOS 2  --  0      Coagulation Recent Labs     07/14/22 0539 07/13/22 2248 07/13/22 1652 07/13/22  1127   PTP  --   --  14.8  --    INR  --   --  1.1  --    APTT 80.8* 74.1*  --    < >    < > = values in this interval not displayed.        Lipid Panel Lab Results   Component Value Date/Time    Cholesterol, total 127 07/14/2022 05:39 AM    HDL Cholesterol 43 07/14/2022 05:39 AM    LDL, calculated 73.8 07/14/2022 05:39 AM    VLDL, calculated 10.2 07/14/2022 05:39 AM    Triglyceride 51 07/14/2022 05:39 AM    CHOL/HDL Ratio 3.0 07/14/2022 05:39 AM      Liver Enzymes Recent Labs     07/13/22 1652   TP 7.6   ALB 3.5   AP 59      Thyroid Studies Lab Results   Component Value Date/Time    TSH 0.68 07/13/2022 04:52 PM          Signed By: Krishna Negron PA-C     July 14, 2022

## 2022-07-14 NOTE — PROGRESS NOTES
Comprehensive Nutrition Assessment    Type and Reason for Visit: Initial,Positive nutrition screen    Nutrition Recommendations/Plan:   1. Recommend updating dietary preferences: no eggs with breakfast.  2. Recommend updating Ensure order with flavor preferences- vanilla only. 3. Continue current diet and monitor intake/tolerance of meals/supplements, weight, labs, and POC. Malnutrition Assessment:  Malnutrition Status:  No malnutrition (07/14/22 1246)      Nutrition History and Allergies:   PMHx of chronic tobacco use-0.5 pack/day. Pt came in C/O chest pain beginning 7/12- NSTEMI identified. [facility]    Nutrition Assessment:    MST notification received for pt. Wt hx reviewed: current wt- 115 lb 1.6 oz (bed scale), no recent w hx except >4 years (10/11/17)- 110 lb (+4.5%). PO documentation shows 51-75% of breakfast. Spoke to pt- Pt claimed usual weight was 118 lb and they weighed this just yesterday- suspect small discrepancy between bed scale and home scale. Pt reported no decreased appetite, normal eating is 3 meals/day. Pt reported eating all of breakfast except for the scrambled eggs, which they do not like. PT will drink Ensure when vanilla flavor is received- will update preferences. Nutrition Related Findings:    BM 7/13. Labs reviewed. Pertinent meds- Colace, Milk of magnesia. Per chart, pt scheduled for CABG tomorrow @ 0800. Wound Type: None    Current Nutrition Intake & Therapies:  Average Meal Intake: 51-75%  Average Supplement Intake: %  ADULT DIET Regular  ADULT ORAL NUTRITION SUPPLEMENT Breakfast; Standard High Calorie/High Protein  DIET NPO    Anthropometric Measures:  Height: 5' 2\" (157.5 cm)  Ideal Body Weight (IBW): 110 lbs (50 kg)  Admission Body Weight: 136 lb (No source)  Current Body Wt:  52.2 kg (115 lb 1.6 oz), 104.6 % IBW.  Bed scale  Current BMI (kg/m2): 21  Usual Body Weight: 53.5 kg (118 lb) (per pt report)  % Weight Change (Calculated): -2.5  Weight Adjustment: No adjustment BMI Category: Normal weight (BMI 18.5-24. 9)    Estimated Daily Nutrient Needs:  Energy Requirements Based On: Kcal/kg (25-30 kcal/kg)  Weight Used for Energy Requirements: Current  Energy (kcal/day): 1305 - 1566  Weight Used for Protein Requirements: Current (.8-1)  Protein (g/day): 42 - 52  Method Used for Fluid Requirements: 1 ml/kcal  Fluid (ml/day): 5307 - 4518    Nutrition Diagnosis:   No nutrition diagnosis at this time     Nutrition Interventions:   Food and/or Nutrient Delivery: Modify current diet,Modify oral nutrition supplement (updating preferences)  Nutrition Education/Counseling: No recommendations at this time,Education not indicated  Coordination of Nutrition Care: Continue to monitor while inpatient  Plan of Care discussed with: Patient    Goals:     Goals: Meet at least 75% of estimated needs,by next RD assessment       Nutrition Monitoring and Evaluation:   Behavioral-Environmental Outcomes: None identified  Food/Nutrient Intake Outcomes: Diet advancement/tolerance,Food and nutrient intake,Supplement intake  Physical Signs/Symptoms Outcomes: Biochemical data,Meal time behavior,GI status,Weight    Discharge Planning:    Continue current diet,Continue oral nutrition supplement    [employer]  Contact: [phone]

## 2022-07-14 NOTE — PROGRESS NOTES
Reason for Admission:  NSTEMI (non-ST elevated myocardial infarction) ([facility] [address] [I21.4]                 RUR Score:    5           Plan for utilizing home health:    No                       Likelihood of Readmission:   LOW                         Transition of Care Plan:              Initial assessment completed with patient. Cognitive status of patient: oriented to time, place, person and situation. Face sheet information confirmed:  yes. The patient designates her daughter Urszula Herring to participate in her discharge plan and to receive any needed information. This patient lives in a [facility]. Patient is able to navigate steps as needed. Prior to hospitalization, patient was considered to be independent with ADLs/IADLS : yes. Patient has a current ACP document on file: no      Healthcare Decision Maker:     Click here to complete [address] including selection of the Healthcare Decision Maker Relationship (ie \"Primary\")    The daughter will be available to transport patient home upon discharge. The patient already has none reported medical equipment available in the home. Patient is not currently active with home health. Patient has not stayed in a skilled nursing facility or rehab. Was  stay within last 60 days : no. This patient is on dialysis :no    Currently, the discharge plan is Home. The patient states that she can obtain her medications from the pharmacy, and take her medications as directed. Patient's current insurance is Medicaid       Care Management Interventions  PCP Verified by CM: No  Mode of Transport at Discharge:  Other (see comment) (family)  Transition of Care Consult (CM Consult): Discharge Planning  Support Systems: Child(randall)  Confirm Follow Up Transport: Self  Discharge Location  Patient Expects to be Discharged to[de-identified] Home with family assistance        ANNE-MARIE Ferrara RN  Care Management  Pager: [phone]

## 2022-07-15 ENCOUNTER — ANESTHESIA (OUTPATIENT)
Dept: CARDIOTHORACIC SURGERY | Age: 62
DRG: 234 | End: 2022-07-15
Payer: COMMERCIAL

## 2022-07-15 ENCOUNTER — ANESTHESIA EVENT (OUTPATIENT)
Dept: CARDIOTHORACIC SURGERY | Age: 62
DRG: 234 | End: 2022-07-15
Payer: COMMERCIAL

## 2022-07-15 ENCOUNTER — APPOINTMENT (OUTPATIENT)
Dept: GENERAL RADIOLOGY | Age: 62
DRG: 234 | End: 2022-07-15
Attending: PHYSICIAN ASSISTANT
Payer: COMMERCIAL

## 2022-07-15 LAB
ACT BLD: 115 SECS (ref 79–138)
ACT BLD: 138 SECS (ref 79–138)
ACT BLD: 422 SECS (ref 79–138)
ACT BLD: 451 SECS (ref 79–138)
ACT BLD: 486 SECS (ref 79–138)
ACT BLD: 521 SECS (ref 79–138)
ACT BLD: 596 SECS (ref 79–138)
ACT BLD: 665 SECS (ref 79–138)
ACT BLD: 717 SECS (ref 79–138)
ADMINISTERED INITIALS, ADMINIT: NORMAL
ANION GAP BLD CALC-SCNC: 12 MMOL/L (ref 10–20)
ANION GAP BLD CALC-SCNC: 14 MMOL/L (ref 10–20)
ANION GAP SERPL CALC-SCNC: 9 MMOL/L (ref 3–18)
APTT PPP: 35.6 SEC (ref 23–36.4)
APTT PPP: 61.3 SEC (ref 23–36.4)
ARTERIAL PATENCY WRIST A: ABNORMAL
ARTERIAL PATENCY WRIST A: POSITIVE
BASE DEFICIT BLD-SCNC: 0.3 MMOL/L
BASE DEFICIT BLD-SCNC: 0.3 MMOL/L
BASE DEFICIT BLD-SCNC: 0.6 MMOL/L
BASE DEFICIT BLD-SCNC: 1 MMOL/L
BASE DEFICIT BLD-SCNC: 1.5 MMOL/L
BASE DEFICIT BLD-SCNC: 2.7 MMOL/L
BASE DEFICIT BLD-SCNC: 4.2 MMOL/L
BASE DEFICIT BLD-SCNC: 5.3 MMOL/L
BASE DEFICIT BLD-SCNC: 6.2 MMOL/L
BASE DEFICIT BLDV-SCNC: 2.6 MMOL/L
BASE EXCESS BLD CALC-SCNC: 0 MMOL/L
BASE EXCESS BLD CALC-SCNC: 0.2 MMOL/L
BASE EXCESS BLD CALC-SCNC: 0.5 MMOL/L
BASOPHILS # BLD: 0 K/UL (ref 0–0.1)
BASOPHILS # BLD: 0 K/UL (ref 0–0.1)
BASOPHILS NFR BLD: 0 % (ref 0–2)
BASOPHILS NFR BLD: 0 % (ref 0–2)
BDY SITE: ABNORMAL
BDY SITE: NORMAL
BODY TEMPERATURE: 96.8
BODY TEMPERATURE: 97.1
BUN SERPL-MCNC: 10 MG/DL (ref 7–18)
BUN/CREAT SERPL: 13 (ref 12–20)
CA-I BLD-MCNC: 0.92 MMOL/L (ref 1.12–1.32)
CA-I BLD-MCNC: 0.92 MMOL/L (ref 1.12–1.32)
CA-I BLD-MCNC: 1.02 MMOL/L (ref 1.12–1.32)
CA-I BLD-MCNC: 1.14 MMOL/L (ref 1.12–1.32)
CA-I BLD-MCNC: 1.15 MMOL/L (ref 1.12–1.32)
CA-I BLD-MCNC: 1.17 MMOL/L (ref 1.12–1.32)
CA-I BLD-MCNC: 1.19 MMOL/L (ref 1.12–1.32)
CA-I BLD-MCNC: 1.21 MMOL/L (ref 1.12–1.32)
CALCIUM SERPL-MCNC: 8.1 MG/DL (ref 8.5–10.1)
CHLORIDE BLD-SCNC: 101 MMOL/L (ref 98–107)
CHLORIDE BLD-SCNC: 102 MMOL/L (ref 98–107)
CHLORIDE BLD-SCNC: 104 MMOL/L (ref 98–107)
CHLORIDE BLD-SCNC: 105 MMOL/L (ref 98–107)
CHLORIDE BLD-SCNC: 106 MMOL/L (ref 98–107)
CHLORIDE BLD-SCNC: 107 MMOL/L (ref 98–107)
CHLORIDE BLD-SCNC: 109 MMOL/L (ref 98–107)
CHLORIDE BLD-SCNC: 112 MMOL/L (ref 98–107)
CHLORIDE SERPL-SCNC: 112 MMOL/L (ref 100–111)
CO2 BLD-SCNC: 22 MMOL/L (ref 19–24)
CO2 BLD-SCNC: 23 MMOL/L (ref 19–24)
CO2 BLD-SCNC: 24 MMOL/L (ref 19–24)
CO2 BLD-SCNC: 25 MMOL/L (ref 19–24)
CO2 BLD-SCNC: 25 MMOL/L (ref 19–24)
CO2 BLDV-SCNC: 23 MMOL/L (ref 22–30)
CO2 SERPL-SCNC: 23 MMOL/L (ref 21–32)
CREAT BLD-MCNC: 0.66 MG/DL (ref 0.6–1.3)
CREAT BLD-MCNC: 0.68 MG/DL (ref 0.6–1.3)
CREAT BLD-MCNC: 0.71 MG/DL (ref 0.6–1.3)
CREAT BLD-MCNC: 0.71 MG/DL (ref 0.6–1.3)
CREAT BLD-MCNC: 0.73 MG/DL (ref 0.6–1.3)
CREAT BLD-MCNC: 0.77 MG/DL (ref 0.6–1.3)
CREAT BLD-MCNC: 0.81 MG/DL (ref 0.6–1.3)
CREAT BLD-MCNC: 0.98 MG/DL (ref 0.6–1.3)
CREAT SERPL-MCNC: 0.8 MG/DL (ref 0.6–1.3)
D50 ADMINISTERED, D50ADM: 0 ML
D50 ORDER, D50ORD: 0 ML
DIFFERENTIAL METHOD BLD: ABNORMAL
DIFFERENTIAL METHOD BLD: ABNORMAL
EOSINOPHIL # BLD: 0.1 K/UL (ref 0–0.4)
EOSINOPHIL # BLD: 0.1 K/UL (ref 0–0.4)
EOSINOPHIL NFR BLD: 0 % (ref 0–5)
EOSINOPHIL NFR BLD: 2 % (ref 0–5)
ERYTHROCYTE [DISTWIDTH] IN BLOOD BY AUTOMATED COUNT: 12.2 % (ref 11.6–14.5)
ERYTHROCYTE [DISTWIDTH] IN BLOOD BY AUTOMATED COUNT: 12.4 % (ref 11.6–14.5)
FIO2 ON VENT: 60 %
GAS FLOW.O2 O2 DELIVERY SYS: ABNORMAL L/MIN
GAS FLOW.O2 O2 DELIVERY SYS: NORMAL L/MIN
GAS FLOW.O2 SETTING OXYMISER: 16 BPM
GLUCOSE BLD STRIP.AUTO-MCNC: 100 MG/DL (ref 74–106)
GLUCOSE BLD STRIP.AUTO-MCNC: 114 MG/DL (ref 74–106)
GLUCOSE BLD STRIP.AUTO-MCNC: 118 MG/DL (ref 74–106)
GLUCOSE BLD STRIP.AUTO-MCNC: 128 MG/DL (ref 70–110)
GLUCOSE BLD STRIP.AUTO-MCNC: 132 MG/DL (ref 70–110)
GLUCOSE BLD STRIP.AUTO-MCNC: 136 MG/DL (ref 70–110)
GLUCOSE BLD STRIP.AUTO-MCNC: 137 MG/DL (ref 70–110)
GLUCOSE BLD STRIP.AUTO-MCNC: 138 MG/DL (ref 70–110)
GLUCOSE BLD STRIP.AUTO-MCNC: 143 MG/DL (ref 70–110)
GLUCOSE BLD STRIP.AUTO-MCNC: 144 MG/DL (ref 74–106)
GLUCOSE BLD STRIP.AUTO-MCNC: 148 MG/DL (ref 70–110)
GLUCOSE BLD STRIP.AUTO-MCNC: 156 MG/DL (ref 70–110)
GLUCOSE BLD STRIP.AUTO-MCNC: 164 MG/DL (ref 74–106)
GLUCOSE BLD STRIP.AUTO-MCNC: 98 MG/DL (ref 74–106)
GLUCOSE BLD-MCNC: 129 MG/DL (ref 65–100)
GLUCOSE BLD-MCNC: 139 MG/DL (ref 65–100)
GLUCOSE SERPL-MCNC: 147 MG/DL (ref 74–99)
GLUCOSE, GLC: 128 MG/DL
GLUCOSE, GLC: 132 MG/DL
GLUCOSE, GLC: 136 MG/DL
GLUCOSE, GLC: 137 MG/DL
GLUCOSE, GLC: 138 MG/DL
GLUCOSE, GLC: 139 MG/DL
GLUCOSE, GLC: 143 MG/DL
GLUCOSE, GLC: 148 MG/DL
GLUCOSE, GLC: 156 MG/DL
HCO3 BLD-SCNC: 19.7 MMOL/L (ref 22–26)
HCO3 BLD-SCNC: 21 MMOL/L (ref 22–26)
HCO3 BLD-SCNC: 21.2 MMOL/L (ref 22–26)
HCO3 BLD-SCNC: 21.7 MMOL/L (ref 22–26)
HCO3 BLD-SCNC: 22.9 MMOL/L (ref 22–26)
HCO3 BLD-SCNC: 23.6 MMOL/L (ref 22–26)
HCO3 BLD-SCNC: 23.9 MMOL/L (ref 22–26)
HCO3 BLD-SCNC: 24.2 MMOL/L (ref 22–26)
HCO3 BLD-SCNC: 24.4 MMOL/L (ref 22–26)
HCO3 BLD-SCNC: 24.4 MMOL/L (ref 22–26)
HCO3 BLD-SCNC: 24.9 MMOL/L (ref 22–26)
HCO3 BLD-SCNC: 24.9 MMOL/L (ref 22–26)
HCO3 BLDV-SCNC: 22.9 MMOL/L (ref 23–28)
HCT VFR BLD AUTO: 28 % (ref 35–45)
HCT VFR BLD AUTO: 36.2 % (ref 35–45)
HCT VFR BLD CALC: 22 % (ref 36–49)
HCT VFR BLD CALC: 22 % (ref 36–49)
HCT VFR BLD CALC: 25 % (ref 36–49)
HCT VFR BLD CALC: 26 % (ref 36–49)
HCT VFR BLD CALC: 35 % (ref 36–49)
HCT VFR BLD CALC: 38 % (ref 36–49)
HGB BLD-MCNC: 11.8 G/DL (ref 12–16)
HGB BLD-MCNC: 12.2 G/DL (ref 12–16)
HGB BLD-MCNC: 12.9 G/DL (ref 12–16)
HGB BLD-MCNC: 7.5 G/DL (ref 12–16)
HGB BLD-MCNC: 7.6 G/DL (ref 12–16)
HGB BLD-MCNC: 8.6 G/DL (ref 12–16)
HGB BLD-MCNC: 8.8 G/DL (ref 12–16)
HGB BLD-MCNC: 9.4 G/DL (ref 12–16)
HIGH TARGET, HITG: 150 MG/DL
IMM GRANULOCYTES # BLD AUTO: 0 K/UL (ref 0–0.04)
IMM GRANULOCYTES # BLD AUTO: 0.1 K/UL (ref 0–0.04)
IMM GRANULOCYTES NFR BLD AUTO: 0 % (ref 0–0.5)
IMM GRANULOCYTES NFR BLD AUTO: 1 % (ref 0–0.5)
INR PPP: 1.4 (ref 0.8–1.2)
INSULIN ADMINSTERED, INSADM: 1.6 UNITS/HOUR
INSULIN ADMINSTERED, INSADM: 2 UNITS/HOUR
INSULIN ADMINSTERED, INSADM: 2.2 UNITS/HOUR
INSULIN ADMINSTERED, INSADM: 2.3 UNITS/HOUR
INSULIN ADMINSTERED, INSADM: 2.3 UNITS/HOUR
INSULIN ADMINSTERED, INSADM: 2.4 UNITS/HOUR
INSULIN ADMINSTERED, INSADM: 2.5 UNITS/HOUR
INSULIN ADMINSTERED, INSADM: 2.6 UNITS/HOUR
INSULIN ADMINSTERED, INSADM: 2.9 UNITS/HOUR
INSULIN ORDER, INSORD: 1.6 UNITS/HOUR
INSULIN ORDER, INSORD: 2 UNITS/HOUR
INSULIN ORDER, INSORD: 2.2 UNITS/HOUR
INSULIN ORDER, INSORD: 2.3 UNITS/HOUR
INSULIN ORDER, INSORD: 2.3 UNITS/HOUR
INSULIN ORDER, INSORD: 2.4 UNITS/HOUR
INSULIN ORDER, INSORD: 2.5 UNITS/HOUR
INSULIN ORDER, INSORD: 2.6 UNITS/HOUR
INSULIN ORDER, INSORD: 2.9 UNITS/HOUR
IPAP/PIP, IPAPIP: 29
LACTATE BLD-SCNC: 2.38 MMOL/L (ref 0.4–2)
LACTATE BLD-SCNC: 2.93 MMOL/L (ref 0.4–2)
LACTATE SERPL-SCNC: 3 MMOL/L (ref 0.4–2)
LACTATE SERPL-SCNC: 4.5 MMOL/L (ref 0.4–2)
LOW TARGET, LOT: 80 MG/DL
LYMPHOCYTES # BLD: 1.9 K/UL (ref 0.9–3.6)
LYMPHOCYTES # BLD: 2.4 K/UL (ref 0.9–3.6)
LYMPHOCYTES NFR BLD: 11 % (ref 21–52)
LYMPHOCYTES NFR BLD: 36 % (ref 21–52)
MAGNESIUM SERPL-MCNC: 2.1 MG/DL (ref 1.6–2.6)
MAGNESIUM SERPL-MCNC: 2.7 MG/DL (ref 1.6–2.6)
MCH RBC QN AUTO: 30.7 PG (ref 24–34)
MCH RBC QN AUTO: 30.9 PG (ref 24–34)
MCHC RBC AUTO-ENTMCNC: 33.6 G/DL (ref 31–37)
MCHC RBC AUTO-ENTMCNC: 33.7 G/DL (ref 31–37)
MCV RBC AUTO: 91 FL (ref 78–100)
MCV RBC AUTO: 92.1 FL (ref 78–100)
MINUTES UNTIL NEXT BG, NBG: 60 MIN
MONOCYTES # BLD: 0.7 K/UL (ref 0.05–1.2)
MONOCYTES # BLD: 1.4 K/UL (ref 0.05–1.2)
MONOCYTES NFR BLD: 10 % (ref 3–10)
MONOCYTES NFR BLD: 8 % (ref 3–10)
MULTIPLIER, MUL: 0.02
MULTIPLIER, MUL: 0.03
NEUTS SEG # BLD: 13.9 K/UL (ref 1.8–8)
NEUTS SEG # BLD: 3.5 K/UL (ref 1.8–8)
NEUTS SEG NFR BLD: 52 % (ref 40–73)
NEUTS SEG NFR BLD: 80 % (ref 40–73)
NRBC # BLD: 0 K/UL (ref 0–0.01)
NRBC # BLD: 0 K/UL (ref 0–0.01)
NRBC BLD-RTO: 0 PER 100 WBC
NRBC BLD-RTO: 0 PER 100 WBC
O2/TOTAL GAS SETTING VFR VENT: 35 %
O2/TOTAL GAS SETTING VFR VENT: 40 %
ORDER INITIALS, ORDINIT: NORMAL
PAW @ MEAN EXP FLOW ON VENT: 13 CMH2O
PAW @ MEAN EXP FLOW ON VENT: 9 CMH2O
PCO2 BLD: 32.3 MMHG (ref 35–45)
PCO2 BLD: 36.4 MMHG (ref 35–45)
PCO2 BLD: 36.6 MMHG (ref 35–45)
PCO2 BLD: 37.4 MMHG (ref 35–45)
PCO2 BLD: 37.7 MMHG (ref 35–45)
PCO2 BLD: 37.8 MMHG (ref 35–45)
PCO2 BLD: 38.2 MMHG (ref 35–45)
PCO2 BLD: 38.4 MMHG (ref 35–45)
PCO2 BLD: 39 MMHG (ref 35–45)
PCO2 BLD: 42 MMHG (ref 35–45)
PCO2 BLD: 42 MMHG (ref 35–45)
PCO2 BLD: 42.4 MMHG (ref 35–45)
PCO2 BLDV: 41.3 MMHG (ref 41–51)
PEEP RESPIRATORY: 6 CMH2O
PEEP RESPIRATORY: 6 CM[H2O]
PH BLD: 7.3 [PH] (ref 7.35–7.45)
PH BLD: 7.31 [PH] (ref 7.35–7.45)
PH BLD: 7.32 [PH] (ref 7.35–7.45)
PH BLD: 7.38 [PH] (ref 7.35–7.45)
PH BLD: 7.4 [PH] (ref 7.35–7.45)
PH BLD: 7.4 [PH] (ref 7.35–7.45)
PH BLD: 7.41 [PH] (ref 7.35–7.45)
PH BLD: 7.41 [PH] (ref 7.35–7.45)
PH BLD: 7.42 [PH] (ref 7.35–7.45)
PH BLD: 7.42 [PH] (ref 7.35–7.45)
PH BLD: 7.43 [PH] (ref 7.35–7.45)
PH BLD: 7.43 [PH] (ref 7.35–7.45)
PH BLDV: 7.35 [PH] (ref 7.32–7.42)
PIP ISTAT,IPIP: 19
PLATELET # BLD AUTO: 174 K/UL (ref 135–420)
PLATELET # BLD AUTO: 209 K/UL (ref 135–420)
PMV BLD AUTO: 11.4 FL (ref 9.2–11.8)
PMV BLD AUTO: 11.7 FL (ref 9.2–11.8)
PO2 BLD: 143 MMHG (ref 80–100)
PO2 BLD: 173 MMHG (ref 80–100)
PO2 BLD: 176 MMHG (ref 80–100)
PO2 BLD: 204 MMHG (ref 80–100)
PO2 BLD: 218 MMHG (ref 80–100)
PO2 BLD: 370 MMHG (ref 80–100)
PO2 BLD: 374 MMHG (ref 80–100)
PO2 BLD: 385 MMHG (ref 80–100)
PO2 BLD: 413 MMHG (ref 80–100)
PO2 BLD: 496 MMHG (ref 80–100)
PO2 BLD: 504 MMHG (ref 80–100)
PO2 BLD: 87 MMHG (ref 80–100)
PO2 BLDV: 573 MMHG (ref 25–40)
POTASSIUM BLD-SCNC: 3.6 MMOL/L (ref 3.5–5.1)
POTASSIUM BLD-SCNC: 3.6 MMOL/L (ref 3.5–5.5)
POTASSIUM BLD-SCNC: 4.1 MMOL/L (ref 3.5–5.5)
POTASSIUM BLD-SCNC: 4.2 MMOL/L (ref 3.5–5.1)
POTASSIUM BLD-SCNC: 4.2 MMOL/L (ref 3.5–5.5)
POTASSIUM BLD-SCNC: 4.4 MMOL/L (ref 3.5–5.5)
POTASSIUM BLD-SCNC: 4.5 MMOL/L (ref 3.5–5.5)
POTASSIUM BLD-SCNC: 4.7 MMOL/L (ref 3.5–5.5)
POTASSIUM SERPL-SCNC: 3.6 MMOL/L (ref 3.5–5.5)
PRESSURE SUPPORT SETTING VENT: 10 CMH2O
PRESSURE SUPPORT SETTING VENT: 10 CM[H2O]
PROTHROMBIN TIME: 17.8 SEC (ref 11.5–15.2)
RBC # BLD AUTO: 3.04 M/UL (ref 4.2–5.3)
RBC # BLD AUTO: 3.98 M/UL (ref 4.2–5.3)
SAO2 % BLD: 100 %
SAO2 % BLD: 100 %
SAO2 % BLD: 100 % (ref 92–97)
SAO2 % BLD: 96.8 % (ref 92–97)
SAO2 % BLD: 99.1 % (ref 92–97)
SAO2 % BLD: 99.6 % (ref 92–97)
SAO2 % BLD: 99.6 % (ref 92–97)
SAO2 % BLDV: 100 % (ref 65–88)
SERVICE CMNT-IMP: ABNORMAL
SERVICE CMNT-IMP: NORMAL
SODIUM BLD-SCNC: 138 MMOL/L (ref 136–145)
SODIUM BLD-SCNC: 139 MMOL/L (ref 136–145)
SODIUM BLD-SCNC: 139 MMOL/L (ref 136–145)
SODIUM BLD-SCNC: 141 MMOL/L (ref 136–145)
SODIUM BLD-SCNC: 142 MMOL/L (ref 136–145)
SODIUM BLD-SCNC: 143 MMOL/L (ref 136–145)
SODIUM BLD-SCNC: 143 MMOL/L (ref 136–145)
SODIUM BLD-SCNC: 145 MMOL/L (ref 136–145)
SODIUM SERPL-SCNC: 144 MMOL/L (ref 136–145)
SPECIMEN SITE: ABNORMAL
SPECIMEN SITE: ABNORMAL
SPECIMEN TYPE: ABNORMAL
SPECIMEN TYPE: NORMAL
TOTAL RESP. RATE, ITRR: 16
TOTAL RESP. RATE, ITRR: 16
VENTILATION MODE VENT: ABNORMAL
VENTILATION MODE VENT: ABNORMAL
VT SETTING VENT: 420 ML
VT SETTING VENT: 420 ML
WBC # BLD AUTO: 17.3 K/UL (ref 4.6–13.2)
WBC # BLD AUTO: 6.7 K/UL (ref 4.6–13.2)

## 2022-07-15 PROCEDURE — 76060000042 HC ANESTHESIA 5.5 TO 6 HR: Performed by: THORACIC SURGERY (CARDIOTHORACIC VASCULAR SURGERY)

## 2022-07-15 PROCEDURE — 77030020061 HC IV BLD WRMR ADMIN SET 3M -B: Performed by: ANESTHESIOLOGY

## 2022-07-15 PROCEDURE — 77030026918 HC ADMN ST IV BLD BD -A: Performed by: ANESTHESIOLOGY

## 2022-07-15 PROCEDURE — 77030016037 HC MANFLD MULTI QUES -B: Performed by: ANESTHESIOLOGY

## 2022-07-15 PROCEDURE — 83735 ASSAY OF MAGNESIUM: CPT

## 2022-07-15 PROCEDURE — 5A1935Z RESPIRATORY VENTILATION, LESS THAN 24 CONSECUTIVE HOURS: ICD-10-PCS | Performed by: THORACIC SURGERY (CARDIOTHORACIC VASCULAR SURGERY)

## 2022-07-15 PROCEDURE — 74011636637 HC RX REV CODE- 636/637: Performed by: THORACIC SURGERY (CARDIOTHORACIC VASCULAR SURGERY)

## 2022-07-15 PROCEDURE — 82947 ASSAY GLUCOSE BLOOD QUANT: CPT

## 2022-07-15 PROCEDURE — 77030008500 HC TBNG CONN PRSS BD -A: Performed by: ANESTHESIOLOGY

## 2022-07-15 PROCEDURE — 77030038692 HC WND DEB SYS IRMX -B: Performed by: THORACIC SURGERY (CARDIOTHORACIC VASCULAR SURGERY)

## 2022-07-15 PROCEDURE — 80048 BASIC METABOLIC PNL TOTAL CA: CPT

## 2022-07-15 PROCEDURE — 77030002986 HC SUT PROL J&J -A: Performed by: THORACIC SURGERY (CARDIOTHORACIC VASCULAR SURGERY)

## 2022-07-15 PROCEDURE — 77030007667 HC INSRT SUT HLD MEDT -B: Performed by: THORACIC SURGERY (CARDIOTHORACIC VASCULAR SURGERY)

## 2022-07-15 PROCEDURE — 74011250636 HC RX REV CODE- 250/636: Performed by: THORACIC SURGERY (CARDIOTHORACIC VASCULAR SURGERY)

## 2022-07-15 PROCEDURE — 02100Z9 BYPASS CORONARY ARTERY, ONE ARTERY FROM LEFT INTERNAL MAMMARY, OPEN APPROACH: ICD-10-PCS | Performed by: THORACIC SURGERY (CARDIOTHORACIC VASCULAR SURGERY)

## 2022-07-15 PROCEDURE — 94002 VENT MGMT INPAT INIT DAY: CPT

## 2022-07-15 PROCEDURE — 74011250637 HC RX REV CODE- 250/637: Performed by: INTERNAL MEDICINE

## 2022-07-15 PROCEDURE — 71045 X-RAY EXAM CHEST 1 VIEW: CPT

## 2022-07-15 PROCEDURE — 77030002982 HC SUT POLYSRB J&J -A: Performed by: THORACIC SURGERY (CARDIOTHORACIC VASCULAR SURGERY)

## 2022-07-15 PROCEDURE — C1769 GUIDE WIRE: HCPCS | Performed by: ANESTHESIOLOGY

## 2022-07-15 PROCEDURE — 06BP4ZZ EXCISION OF RIGHT SAPHENOUS VEIN, PERCUTANEOUS ENDOSCOPIC APPROACH: ICD-10-PCS | Performed by: THORACIC SURGERY (CARDIOTHORACIC VASCULAR SURGERY)

## 2022-07-15 PROCEDURE — 74011250636 HC RX REV CODE- 250/636: Performed by: ANESTHESIOLOGY

## 2022-07-15 PROCEDURE — 2709999900 HC NON-CHARGEABLE SUPPLY: Performed by: THORACIC SURGERY (CARDIOTHORACIC VASCULAR SURGERY)

## 2022-07-15 PROCEDURE — 85025 COMPLETE CBC W/AUTO DIFF WBC: CPT

## 2022-07-15 PROCEDURE — 021109W BYPASS CORONARY ARTERY, TWO ARTERIES FROM AORTA WITH AUTOLOGOUS VENOUS TISSUE, OPEN APPROACH: ICD-10-PCS | Performed by: THORACIC SURGERY (CARDIOTHORACIC VASCULAR SURGERY)

## 2022-07-15 PROCEDURE — 94762 N-INVAS EAR/PLS OXIMTRY CONT: CPT

## 2022-07-15 PROCEDURE — 77030014491 HC PLEDG PTFE BARD -A: Performed by: THORACIC SURGERY (CARDIOTHORACIC VASCULAR SURGERY)

## 2022-07-15 PROCEDURE — 5A2204Z RESTORATION OF CARDIAC RHYTHM, SINGLE: ICD-10-PCS | Performed by: THORACIC SURGERY (CARDIOTHORACIC VASCULAR SURGERY)

## 2022-07-15 PROCEDURE — C1751 CATH, INF, PER/CENT/MIDLINE: HCPCS | Performed by: ANESTHESIOLOGY

## 2022-07-15 PROCEDURE — 74011250637 HC RX REV CODE- 250/637: Performed by: PHYSICIAN ASSISTANT

## 2022-07-15 PROCEDURE — 31500 INSERT EMERGENCY AIRWAY: CPT

## 2022-07-15 PROCEDURE — 33518 CABG ARTERY-VEIN TWO: CPT | Performed by: PHYSICIAN ASSISTANT

## 2022-07-15 PROCEDURE — 77030008683 HC TU ET CUF COVD -A: Performed by: ANESTHESIOLOGY

## 2022-07-15 PROCEDURE — 77030005401 HC CATH RAD ARRO -A: Performed by: ANESTHESIOLOGY

## 2022-07-15 PROCEDURE — 77030019896 HC KT ARTERIAL LN TELE -B: Performed by: THORACIC SURGERY (CARDIOTHORACIC VASCULAR SURGERY)

## 2022-07-15 PROCEDURE — C1769 GUIDE WIRE: HCPCS | Performed by: THORACIC SURGERY (CARDIOTHORACIC VASCULAR SURGERY)

## 2022-07-15 PROCEDURE — 77030018836 HC SOL IRR NACL ICUM -A: Performed by: THORACIC SURGERY (CARDIOTHORACIC VASCULAR SURGERY)

## 2022-07-15 PROCEDURE — 33508 ENDOSCOPIC VEIN HARVEST: CPT | Performed by: PHYSICIAN ASSISTANT

## 2022-07-15 PROCEDURE — 74011000258 HC RX REV CODE- 258: Performed by: THORACIC SURGERY (CARDIOTHORACIC VASCULAR SURGERY)

## 2022-07-15 PROCEDURE — 82803 BLOOD GASES ANY COMBINATION: CPT

## 2022-07-15 PROCEDURE — C1729 CATH, DRAINAGE: HCPCS | Performed by: THORACIC SURGERY (CARDIOTHORACIC VASCULAR SURGERY)

## 2022-07-15 PROCEDURE — 76937 US GUIDE VASCULAR ACCESS: CPT | Performed by: ANESTHESIOLOGY

## 2022-07-15 PROCEDURE — 06BQ4ZZ EXCISION OF LEFT SAPHENOUS VEIN, PERCUTANEOUS ENDOSCOPIC APPROACH: ICD-10-PCS | Performed by: THORACIC SURGERY (CARDIOTHORACIC VASCULAR SURGERY)

## 2022-07-15 PROCEDURE — 77030005401 HC CATH RAD ARRO -A: Performed by: THORACIC SURGERY (CARDIOTHORACIC VASCULAR SURGERY)

## 2022-07-15 PROCEDURE — 74011636637 HC RX REV CODE- 636/637: Performed by: ANESTHESIOLOGY

## 2022-07-15 PROCEDURE — 74011000250 HC RX REV CODE- 250: Performed by: ANESTHESIOLOGY

## 2022-07-15 PROCEDURE — 65620000000 HC RM CCU GENERAL

## 2022-07-15 PROCEDURE — 36600 WITHDRAWAL OF ARTERIAL BLOOD: CPT

## 2022-07-15 PROCEDURE — 77030002996 HC SUT SLK J&J -A: Performed by: THORACIC SURGERY (CARDIOTHORACIC VASCULAR SURGERY)

## 2022-07-15 PROCEDURE — 77030006920 HC BLD STRNL SAW STRY -B: Performed by: THORACIC SURGERY (CARDIOTHORACIC VASCULAR SURGERY)

## 2022-07-15 PROCEDURE — 76010000203 HC CV SURG 5.5 TO 6 HR INTENSV-TIER 1: Performed by: THORACIC SURGERY (CARDIOTHORACIC VASCULAR SURGERY)

## 2022-07-15 PROCEDURE — 77030010818: Performed by: THORACIC SURGERY (CARDIOTHORACIC VASCULAR SURGERY)

## 2022-07-15 PROCEDURE — 77030010516 HC APPL HEMA CLP TELE -B: Performed by: THORACIC SURGERY (CARDIOTHORACIC VASCULAR SURGERY)

## 2022-07-15 PROCEDURE — 74011000250 HC RX REV CODE- 250: Performed by: PHYSICIAN ASSISTANT

## 2022-07-15 PROCEDURE — 77030018719 HC DRSG PTCH ANTIMIC J&J -A: Performed by: ANESTHESIOLOGY

## 2022-07-15 PROCEDURE — 77030012407 HC DRN WND BARD -B: Performed by: THORACIC SURGERY (CARDIOTHORACIC VASCULAR SURGERY)

## 2022-07-15 PROCEDURE — 77030022199 HC SYS HARV VESL GTNG -G1: Performed by: THORACIC SURGERY (CARDIOTHORACIC VASCULAR SURGERY)

## 2022-07-15 PROCEDURE — 36556 INSERT NON-TUNNEL CV CATH: CPT | Performed by: ANESTHESIOLOGY

## 2022-07-15 PROCEDURE — 33533 CABG ARTERIAL SINGLE: CPT | Performed by: PHYSICIAN ASSISTANT

## 2022-07-15 PROCEDURE — C1751 CATH, INF, PER/CENT/MIDLINE: HCPCS | Performed by: THORACIC SURGERY (CARDIOTHORACIC VASCULAR SURGERY)

## 2022-07-15 PROCEDURE — 74011250636 HC RX REV CODE- 250/636: Performed by: INTERNAL MEDICINE

## 2022-07-15 PROCEDURE — 77030003010 HC SUT SURG STL J&J -B: Performed by: THORACIC SURGERY (CARDIOTHORACIC VASCULAR SURGERY)

## 2022-07-15 PROCEDURE — 77030026855 HC PNCH AORT MYO AEMC -B: Performed by: THORACIC SURGERY (CARDIOTHORACIC VASCULAR SURGERY)

## 2022-07-15 PROCEDURE — 77030013313: Performed by: THORACIC SURGERY (CARDIOTHORACIC VASCULAR SURGERY)

## 2022-07-15 PROCEDURE — 74011636637 HC RX REV CODE- 636/637: Performed by: PHYSICIAN ASSISTANT

## 2022-07-15 PROCEDURE — 74011250636 HC RX REV CODE- 250/636: Performed by: PHYSICIAN ASSISTANT

## 2022-07-15 PROCEDURE — 77030030163 HC BN WAX J&J -A: Performed by: THORACIC SURGERY (CARDIOTHORACIC VASCULAR SURGERY)

## 2022-07-15 PROCEDURE — 77030002987 HC SUT PROL J&J -B: Performed by: THORACIC SURGERY (CARDIOTHORACIC VASCULAR SURGERY)

## 2022-07-15 PROCEDURE — 77030012390 HC DRN CHST BTL GTNG -B: Performed by: THORACIC SURGERY (CARDIOTHORACIC VASCULAR SURGERY)

## 2022-07-15 PROCEDURE — 85730 THROMBOPLASTIN TIME PARTIAL: CPT

## 2022-07-15 PROCEDURE — 77030008771 HC TU NG SALEM SUMP -A: Performed by: ANESTHESIOLOGY

## 2022-07-15 PROCEDURE — 83605 ASSAY OF LACTIC ACID: CPT

## 2022-07-15 PROCEDURE — 77030013079 HC BLNKT BAIR HGGR 3M -A: Performed by: ANESTHESIOLOGY

## 2022-07-15 PROCEDURE — 93005 ELECTROCARDIOGRAM TRACING: CPT

## 2022-07-15 PROCEDURE — 77030031139 HC SUT VCRL2 J&J -A: Performed by: THORACIC SURGERY (CARDIOTHORACIC VASCULAR SURGERY)

## 2022-07-15 PROCEDURE — 74011000258 HC RX REV CODE- 258: Performed by: ANESTHESIOLOGY

## 2022-07-15 PROCEDURE — 74011000258 HC RX REV CODE- 258: Performed by: PHYSICIAN ASSISTANT

## 2022-07-15 PROCEDURE — 77030010929 HC CLMP VASC FGRTY EDWD -B: Performed by: THORACIC SURGERY (CARDIOTHORACIC VASCULAR SURGERY)

## 2022-07-15 PROCEDURE — 77030015683 HC ADPT CRDPLG MEDT -B: Performed by: THORACIC SURGERY (CARDIOTHORACIC VASCULAR SURGERY)

## 2022-07-15 PROCEDURE — 77030002912 HC SUT ETHBND J&J -A: Performed by: THORACIC SURGERY (CARDIOTHORACIC VASCULAR SURGERY)

## 2022-07-15 PROCEDURE — P9045 ALBUMIN (HUMAN), 5%, 250 ML: HCPCS | Performed by: PHYSICIAN ASSISTANT

## 2022-07-15 PROCEDURE — 77030018390 HC SPNG HEMSTAT2 J&J -B: Performed by: THORACIC SURGERY (CARDIOTHORACIC VASCULAR SURGERY)

## 2022-07-15 PROCEDURE — 00567 ANES DIRECT CABG W/PUMP: CPT | Performed by: ANESTHESIOLOGY

## 2022-07-15 PROCEDURE — 77010033678 HC OXYGEN DAILY

## 2022-07-15 PROCEDURE — 77030008462 HC STPLR SKN PROX J&J -A: Performed by: THORACIC SURGERY (CARDIOTHORACIC VASCULAR SURGERY)

## 2022-07-15 PROCEDURE — B245ZZ4 ULTRASONOGRAPHY OF LEFT HEART, TRANSESOPHAGEAL: ICD-10-PCS | Performed by: THORACIC SURGERY (CARDIOTHORACIC VASCULAR SURGERY)

## 2022-07-15 PROCEDURE — 36620 INSERTION CATHETER ARTERY: CPT | Performed by: ANESTHESIOLOGY

## 2022-07-15 PROCEDURE — 82962 GLUCOSE BLOOD TEST: CPT

## 2022-07-15 PROCEDURE — 85610 PROTHROMBIN TIME: CPT

## 2022-07-15 PROCEDURE — 74011000272 HC RX REV CODE- 272: Performed by: THORACIC SURGERY (CARDIOTHORACIC VASCULAR SURGERY)

## 2022-07-15 PROCEDURE — 5A1221Z PERFORMANCE OF CARDIAC OUTPUT, CONTINUOUS: ICD-10-PCS | Performed by: THORACIC SURGERY (CARDIOTHORACIC VASCULAR SURGERY)

## 2022-07-15 PROCEDURE — 77030002933 HC SUT MCRYL J&J -A: Performed by: THORACIC SURGERY (CARDIOTHORACIC VASCULAR SURGERY)

## 2022-07-15 PROCEDURE — 77030010512 HC APPL CLP LIG J&J -C: Performed by: THORACIC SURGERY (CARDIOTHORACIC VASCULAR SURGERY)

## 2022-07-15 PROCEDURE — 77030034848: Performed by: THORACIC SURGERY (CARDIOTHORACIC VASCULAR SURGERY)

## 2022-07-15 PROCEDURE — 85347 COAGULATION TIME ACTIVATED: CPT

## 2022-07-15 RX ORDER — SODIUM CHLORIDE 0.9 % (FLUSH) 0.9 %
5-40 SYRINGE (ML) INJECTION EVERY 8 HOURS
Status: DISCONTINUED | OUTPATIENT
Start: 2022-07-15 | End: 2022-07-25 | Stop reason: HOSPADM

## 2022-07-15 RX ORDER — CHLORHEXIDINE GLUCONATE 1.2 MG/ML
10 RINSE ORAL 2 TIMES DAILY
Status: DISCONTINUED | OUTPATIENT
Start: 2022-07-15 | End: 2022-07-25 | Stop reason: HOSPADM

## 2022-07-15 RX ORDER — FENTANYL CITRATE 50 UG/ML
12.5-25 INJECTION, SOLUTION INTRAMUSCULAR; INTRAVENOUS
Status: DISCONTINUED | OUTPATIENT
Start: 2022-07-15 | End: 2022-07-17 | Stop reason: SDUPTHER

## 2022-07-15 RX ORDER — EPHEDRINE SULFATE/0.9% NACL/PF 50 MG/5 ML
SYRINGE (ML) INTRAVENOUS AS NEEDED
Status: DISCONTINUED | OUTPATIENT
Start: 2022-07-15 | End: 2022-07-15 | Stop reason: HOSPADM

## 2022-07-15 RX ORDER — MUPIROCIN 20 MG/G
OINTMENT TOPICAL 2 TIMES DAILY
Status: COMPLETED | OUTPATIENT
Start: 2022-07-15 | End: 2022-07-17

## 2022-07-15 RX ORDER — PROPOFOL 10 MG/ML
VIAL (ML) INTRAVENOUS
Status: DISCONTINUED | OUTPATIENT
Start: 2022-07-15 | End: 2022-07-15 | Stop reason: HOSPADM

## 2022-07-15 RX ORDER — POLYETHYLENE GLYCOL 3350 17 G/17G
17 POWDER, FOR SOLUTION ORAL DAILY
Status: DISCONTINUED | OUTPATIENT
Start: 2022-07-16 | End: 2022-07-18

## 2022-07-15 RX ORDER — VANCOMYCIN HYDROCHLORIDE 1 G/20ML
INJECTION, POWDER, LYOPHILIZED, FOR SOLUTION INTRAVENOUS AS NEEDED
Status: DISCONTINUED | OUTPATIENT
Start: 2022-07-15 | End: 2022-07-15 | Stop reason: HOSPADM

## 2022-07-15 RX ORDER — HEPARIN SODIUM 1000 [USP'U]/ML
INJECTION, SOLUTION INTRAVENOUS; SUBCUTANEOUS AS NEEDED
Status: DISCONTINUED | OUTPATIENT
Start: 2022-07-15 | End: 2022-07-15 | Stop reason: HOSPADM

## 2022-07-15 RX ORDER — ALBUMIN HUMAN 50 G/1000ML
12.5 SOLUTION INTRAVENOUS AS NEEDED
Status: COMPLETED | OUTPATIENT
Start: 2022-07-15 | End: 2022-07-15

## 2022-07-15 RX ORDER — SODIUM CHLORIDE 9 MG/ML
INJECTION, SOLUTION INTRAVENOUS
Status: DISCONTINUED | OUTPATIENT
Start: 2022-07-15 | End: 2022-07-15 | Stop reason: HOSPADM

## 2022-07-15 RX ORDER — SODIUM CHLORIDE 9 MG/ML
10 INJECTION, SOLUTION INTRAVENOUS CONTINUOUS
Status: DISCONTINUED | OUTPATIENT
Start: 2022-07-15 | End: 2022-07-18

## 2022-07-15 RX ORDER — HYDROCODONE BITARTRATE AND ACETAMINOPHEN 5; 325 MG/1; MG/1
1-2 TABLET ORAL
Status: DISCONTINUED | OUTPATIENT
Start: 2022-07-15 | End: 2022-07-25 | Stop reason: HOSPADM

## 2022-07-15 RX ORDER — MORPHINE SULFATE 2 MG/ML
2-4 INJECTION, SOLUTION INTRAMUSCULAR; INTRAVENOUS
Status: DISPENSED | OUTPATIENT
Start: 2022-07-15 | End: 2022-07-16

## 2022-07-15 RX ORDER — SODIUM CHLORIDE 0.9 % (FLUSH) 0.9 %
5-40 SYRINGE (ML) INJECTION AS NEEDED
Status: DISCONTINUED | OUTPATIENT
Start: 2022-07-15 | End: 2022-07-25 | Stop reason: HOSPADM

## 2022-07-15 RX ORDER — PAPAVERINE HYDROCHLORIDE 30 MG/ML
INJECTION INTRAMUSCULAR; INTRAVENOUS
Status: DISPENSED
Start: 2022-07-15 | End: 2022-07-15

## 2022-07-15 RX ORDER — ONDANSETRON 2 MG/ML
4 INJECTION INTRAMUSCULAR; INTRAVENOUS
Status: DISCONTINUED | OUTPATIENT
Start: 2022-07-15 | End: 2022-07-25 | Stop reason: HOSPADM

## 2022-07-15 RX ORDER — MIDAZOLAM HYDROCHLORIDE 1 MG/ML
INJECTION, SOLUTION INTRAMUSCULAR; INTRAVENOUS AS NEEDED
Status: DISCONTINUED | OUTPATIENT
Start: 2022-07-15 | End: 2022-07-15 | Stop reason: HOSPADM

## 2022-07-15 RX ORDER — PROTAMINE SULFATE 10 MG/ML
INJECTION, SOLUTION INTRAVENOUS AS NEEDED
Status: DISCONTINUED | OUTPATIENT
Start: 2022-07-15 | End: 2022-07-15 | Stop reason: HOSPADM

## 2022-07-15 RX ORDER — DEXTROSE MONOHYDRATE 100 MG/ML
0-250 INJECTION, SOLUTION INTRAVENOUS AS NEEDED
Status: DISCONTINUED | OUTPATIENT
Start: 2022-07-15 | End: 2022-07-16

## 2022-07-15 RX ORDER — ALBUMIN HUMAN 50 G/1000ML
SOLUTION INTRAVENOUS
Status: DISPENSED
Start: 2022-07-15 | End: 2022-07-15

## 2022-07-15 RX ORDER — ROCURONIUM BROMIDE 10 MG/ML
INJECTION, SOLUTION INTRAVENOUS AS NEEDED
Status: DISCONTINUED | OUTPATIENT
Start: 2022-07-15 | End: 2022-07-15 | Stop reason: HOSPADM

## 2022-07-15 RX ORDER — NALOXONE HYDROCHLORIDE 0.4 MG/ML
0.4 INJECTION, SOLUTION INTRAMUSCULAR; INTRAVENOUS; SUBCUTANEOUS AS NEEDED
Status: DISCONTINUED | OUTPATIENT
Start: 2022-07-15 | End: 2022-07-25 | Stop reason: HOSPADM

## 2022-07-15 RX ORDER — ENOXAPARIN SODIUM 100 MG/ML
40 INJECTION SUBCUTANEOUS EVERY 24 HOURS
Status: DISCONTINUED | OUTPATIENT
Start: 2022-07-17 | End: 2022-07-25 | Stop reason: HOSPADM

## 2022-07-15 RX ORDER — BISACODYL 5 MG
10 TABLET, DELAYED RELEASE (ENTERIC COATED) ORAL DAILY
Status: DISCONTINUED | OUTPATIENT
Start: 2022-07-16 | End: 2022-07-18

## 2022-07-15 RX ORDER — ACETAMINOPHEN 325 MG/1
650 TABLET ORAL
Status: DISCONTINUED | OUTPATIENT
Start: 2022-07-15 | End: 2022-07-25 | Stop reason: HOSPADM

## 2022-07-15 RX ORDER — CALCIUM CHLORIDE INJECTION 100 MG/ML
INJECTION, SOLUTION INTRAVENOUS AS NEEDED
Status: DISCONTINUED | OUTPATIENT
Start: 2022-07-15 | End: 2022-07-15 | Stop reason: HOSPADM

## 2022-07-15 RX ORDER — VANCOMYCIN HYDROCHLORIDE 1 G/20ML
INJECTION, POWDER, LYOPHILIZED, FOR SOLUTION INTRAVENOUS
Status: DISPENSED
Start: 2022-07-15 | End: 2022-07-15

## 2022-07-15 RX ORDER — HEPARIN SODIUM 1000 [USP'U]/ML
INJECTION, SOLUTION INTRAVENOUS; SUBCUTANEOUS
Status: DISPENSED
Start: 2022-07-15 | End: 2022-07-15

## 2022-07-15 RX ORDER — PROPOFOL 10 MG/ML
0-50 VIAL (ML) INTRAVENOUS
Status: DISCONTINUED | OUTPATIENT
Start: 2022-07-15 | End: 2022-07-16

## 2022-07-15 RX ORDER — ALBUTEROL SULFATE 0.83 MG/ML
2.5 SOLUTION RESPIRATORY (INHALATION)
Status: DISCONTINUED | OUTPATIENT
Start: 2022-07-15 | End: 2022-07-25 | Stop reason: HOSPADM

## 2022-07-15 RX ORDER — PROPOFOL 10 MG/ML
INJECTION, EMULSION INTRAVENOUS
Status: COMPLETED
Start: 2022-07-15 | End: 2022-07-15

## 2022-07-15 RX ORDER — PAPAVERINE HYDROCHLORIDE 30 MG/ML
INJECTION INTRAMUSCULAR; INTRAVENOUS
Status: DISCONTINUED
Start: 2022-07-15 | End: 2022-07-15 | Stop reason: WASHOUT

## 2022-07-15 RX ORDER — SODIUM CHLORIDE 9 MG/ML
INJECTION INTRAMUSCULAR; INTRAVENOUS; SUBCUTANEOUS
Status: COMPLETED
Start: 2022-07-15 | End: 2022-07-15

## 2022-07-15 RX ORDER — AMINOCAPROIC ACID 250 MG/ML
INJECTION, SOLUTION INTRAVENOUS AS NEEDED
Status: DISCONTINUED | OUTPATIENT
Start: 2022-07-15 | End: 2022-07-15 | Stop reason: HOSPADM

## 2022-07-15 RX ORDER — AMIODARONE HYDROCHLORIDE 200 MG/1
200 TABLET ORAL 3 TIMES DAILY
Status: DISCONTINUED | OUTPATIENT
Start: 2022-07-15 | End: 2022-07-15

## 2022-07-15 RX ORDER — METOPROLOL TARTRATE 25 MG/1
12.5 TABLET, FILM COATED ORAL EVERY 12 HOURS
Status: DISCONTINUED | OUTPATIENT
Start: 2022-07-16 | End: 2022-07-25 | Stop reason: HOSPADM

## 2022-07-15 RX ORDER — PROPOFOL 10 MG/ML
INJECTION, EMULSION INTRAVENOUS AS NEEDED
Status: DISCONTINUED | OUTPATIENT
Start: 2022-07-15 | End: 2022-07-15 | Stop reason: HOSPADM

## 2022-07-15 RX ORDER — DOCUSATE SODIUM 100 MG/1
100 CAPSULE, LIQUID FILLED ORAL 2 TIMES DAILY
Status: DISCONTINUED | OUTPATIENT
Start: 2022-07-15 | End: 2022-07-25 | Stop reason: HOSPADM

## 2022-07-15 RX ORDER — FENTANYL CITRATE 50 UG/ML
INJECTION, SOLUTION INTRAMUSCULAR; INTRAVENOUS AS NEEDED
Status: DISCONTINUED | OUTPATIENT
Start: 2022-07-15 | End: 2022-07-15 | Stop reason: HOSPADM

## 2022-07-15 RX ORDER — ASPIRIN 81 MG/1
81 TABLET ORAL DAILY
Status: DISCONTINUED | OUTPATIENT
Start: 2022-07-16 | End: 2022-07-25 | Stop reason: HOSPADM

## 2022-07-15 RX ORDER — FAMOTIDINE 20 MG/1
20 TABLET, FILM COATED ORAL 2 TIMES DAILY
Status: DISCONTINUED | OUTPATIENT
Start: 2022-07-15 | End: 2022-07-25 | Stop reason: HOSPADM

## 2022-07-15 RX ORDER — SUCCINYLCHOLINE CHLORIDE 20 MG/ML
INJECTION INTRAMUSCULAR; INTRAVENOUS AS NEEDED
Status: DISCONTINUED | OUTPATIENT
Start: 2022-07-15 | End: 2022-07-15 | Stop reason: HOSPADM

## 2022-07-15 RX ORDER — NITROGLYCERIN 40 MG/100ML
0-20 INJECTION INTRAVENOUS
Status: DISCONTINUED | OUTPATIENT
Start: 2022-07-15 | End: 2022-07-16

## 2022-07-15 RX ORDER — POTASSIUM CHLORIDE 7.45 MG/ML
10 INJECTION INTRAVENOUS
Status: DISCONTINUED | OUTPATIENT
Start: 2022-07-15 | End: 2022-07-15

## 2022-07-15 RX ORDER — MAGNESIUM SULFATE 100 %
4 CRYSTALS MISCELLANEOUS AS NEEDED
Status: DISCONTINUED | OUTPATIENT
Start: 2022-07-15 | End: 2022-07-16 | Stop reason: SDUPTHER

## 2022-07-15 RX ORDER — VECURONIUM BROMIDE FOR INJECTION 1 MG/ML
INJECTION, POWDER, LYOPHILIZED, FOR SOLUTION INTRAVENOUS AS NEEDED
Status: DISCONTINUED | OUTPATIENT
Start: 2022-07-15 | End: 2022-07-15 | Stop reason: HOSPADM

## 2022-07-15 RX ORDER — POTASSIUM CHLORIDE 14.9 MG/ML
20 INJECTION INTRAVENOUS
Status: COMPLETED | OUTPATIENT
Start: 2022-07-15 | End: 2022-07-15

## 2022-07-15 RX ADMIN — Medication 10 MCG/MIN: at 09:33

## 2022-07-15 RX ADMIN — FENTANYL CITRATE 50 MCG: 50 INJECTION, SOLUTION INTRAMUSCULAR; INTRAVENOUS at 09:07

## 2022-07-15 RX ADMIN — ALBUMIN (HUMAN) 12.5 G: 12.5 INJECTION, SOLUTION INTRAVENOUS at 14:40

## 2022-07-15 RX ADMIN — HEPARIN SODIUM 5000 UNITS: 1000 INJECTION, SOLUTION INTRAVENOUS; SUBCUTANEOUS at 09:40

## 2022-07-15 RX ADMIN — FENTANYL CITRATE 12.5 MCG: 50 INJECTION, SOLUTION INTRAMUSCULAR; INTRAVENOUS at 16:28

## 2022-07-15 RX ADMIN — WATER 2 G: 1 INJECTION INTRAMUSCULAR; INTRAVENOUS; SUBCUTANEOUS at 09:00

## 2022-07-15 RX ADMIN — EPINEPHRINE 2 MCG/MIN: 1 INJECTION INTRAMUSCULAR; INTRAVENOUS; SUBCUTANEOUS at 12:30

## 2022-07-15 RX ADMIN — MORPHINE SULFATE 2 MG: 2 INJECTION, SOLUTION INTRAMUSCULAR; INTRAVENOUS at 18:31

## 2022-07-15 RX ADMIN — Medication 10 MCG/MIN: at 08:38

## 2022-07-15 RX ADMIN — CALCIUM CHLORIDE 0.5 G: 100 INJECTION INTRAVENOUS; INTRAVENTRICULAR at 12:36

## 2022-07-15 RX ADMIN — AMINOCAPROIC ACID 1 G/HR: 250 INJECTION, SOLUTION INTRAVENOUS at 10:00

## 2022-07-15 RX ADMIN — FENTANYL CITRATE 50 MCG: 50 INJECTION, SOLUTION INTRAMUSCULAR; INTRAVENOUS at 10:31

## 2022-07-15 RX ADMIN — ROCURONIUM BROMIDE 20 MG: 50 INJECTION INTRAVENOUS at 12:10

## 2022-07-15 RX ADMIN — AMINOCAPROIC ACID 1 G/HR: 250 INJECTION, SOLUTION INTRAVENOUS at 13:30

## 2022-07-15 RX ADMIN — MUPIROCIN: 20 OINTMENT TOPICAL at 17:46

## 2022-07-15 RX ADMIN — POTASSIUM CHLORIDE 20 MEQ: 14.9 INJECTION, SOLUTION INTRAVENOUS at 17:46

## 2022-07-15 RX ADMIN — FENTANYL CITRATE 100 MCG: 50 INJECTION, SOLUTION INTRAMUSCULAR; INTRAVENOUS at 09:15

## 2022-07-15 RX ADMIN — MIDAZOLAM HYDROCHLORIDE 1 MG: 2 INJECTION, SOLUTION INTRAMUSCULAR; INTRAVENOUS at 10:48

## 2022-07-15 RX ADMIN — ATORVASTATIN CALCIUM 40 MG: 40 TABLET, FILM COATED ORAL at 23:14

## 2022-07-15 RX ADMIN — SODIUM CHLORIDE 2 UNITS/HR: 9 INJECTION, SOLUTION INTRAVENOUS at 21:22

## 2022-07-15 RX ADMIN — Medication 5 MG: at 09:34

## 2022-07-15 RX ADMIN — HEPARIN SODIUM 11000 UNITS: 1000 INJECTION, SOLUTION INTRAVENOUS; SUBCUTANEOUS at 09:57

## 2022-07-15 RX ADMIN — ALBUMIN (HUMAN) 12.5 G: 12.5 INJECTION, SOLUTION INTRAVENOUS at 13:58

## 2022-07-15 RX ADMIN — SODIUM CHLORIDE 10 ML/HR: 9 INJECTION, SOLUTION INTRAVENOUS at 15:33

## 2022-07-15 RX ADMIN — Medication 10 MG: at 09:05

## 2022-07-15 RX ADMIN — Medication 1 UNITS: at 11:40

## 2022-07-15 RX ADMIN — POTASSIUM CHLORIDE 20 MEQ: 14.9 INJECTION, SOLUTION INTRAVENOUS at 15:57

## 2022-07-15 RX ADMIN — Medication 10 MG: at 08:46

## 2022-07-15 RX ADMIN — SODIUM CHLORIDE: 9 INJECTION, SOLUTION INTRAVENOUS at 07:54

## 2022-07-15 RX ADMIN — SODIUM CHLORIDE 2.3 UNITS/HR: 9 INJECTION, SOLUTION INTRAVENOUS at 22:32

## 2022-07-15 RX ADMIN — SODIUM CHLORIDE 500 ML: 9 INJECTION, SOLUTION INTRAVENOUS at 07:26

## 2022-07-15 RX ADMIN — AMINOCAPROIC ACID 5 G: 250 INJECTION, SOLUTION INTRAVENOUS at 10:00

## 2022-07-15 RX ADMIN — VECURONIUM BROMIDE 2 MG: 1 INJECTION, POWDER, LYOPHILIZED, FOR SOLUTION INTRAVENOUS at 09:07

## 2022-07-15 RX ADMIN — WATER 2 G: 1 INJECTION INTRAMUSCULAR; INTRAVENOUS; SUBCUTANEOUS at 13:00

## 2022-07-15 RX ADMIN — CEFAZOLIN 2 G: 1 INJECTION, POWDER, FOR SOLUTION INTRAMUSCULAR; INTRAVENOUS at 23:56

## 2022-07-15 RX ADMIN — VECURONIUM BROMIDE 2 MG: 1 INJECTION, POWDER, LYOPHILIZED, FOR SOLUTION INTRAVENOUS at 10:04

## 2022-07-15 RX ADMIN — SUCCINYLCHOLINE CHLORIDE 100 MG: 20 INJECTION, SOLUTION INTRAMUSCULAR; INTRAVENOUS at 08:03

## 2022-07-15 RX ADMIN — CHLORHEXIDINE GLUCONATE 0.12% ORAL RINSE 10 ML: 1.2 LIQUID ORAL at 17:46

## 2022-07-15 RX ADMIN — FENTANYL CITRATE 100 MCG: 50 INJECTION, SOLUTION INTRAMUSCULAR; INTRAVENOUS at 09:10

## 2022-07-15 RX ADMIN — PROPOFOL 25 MCG/KG/MIN: 10 INJECTION, EMULSION INTRAVENOUS at 11:32

## 2022-07-15 RX ADMIN — FENTANYL CITRATE 50 MCG: 50 INJECTION, SOLUTION INTRAMUSCULAR; INTRAVENOUS at 08:03

## 2022-07-15 RX ADMIN — Medication 4 UNITS/HR: at 10:15

## 2022-07-15 RX ADMIN — Medication 5 MG: at 09:29

## 2022-07-15 RX ADMIN — MIDAZOLAM HYDROCHLORIDE 2 MG: 2 INJECTION, SOLUTION INTRAMUSCULAR; INTRAVENOUS at 07:54

## 2022-07-15 RX ADMIN — SODIUM CHLORIDE 2.4 UNITS/HR: 9 INJECTION, SOLUTION INTRAVENOUS at 23:02

## 2022-07-15 RX ADMIN — HEPARIN SODIUM 12 UNITS/KG/HR: 10000 INJECTION, SOLUTION INTRAVENOUS at 04:50

## 2022-07-15 RX ADMIN — VECURONIUM BROMIDE 4 MG: 1 INJECTION, POWDER, LYOPHILIZED, FOR SOLUTION INTRAVENOUS at 08:07

## 2022-07-15 RX ADMIN — AMIODARONE HYDROCHLORIDE 1 MG/MIN: 1.8 INJECTION, SOLUTION INTRAVENOUS at 23:30

## 2022-07-15 RX ADMIN — SODIUM CHLORIDE, PRESERVATIVE FREE 10 ML: 5 INJECTION INTRAVENOUS at 15:02

## 2022-07-15 RX ADMIN — MORPHINE SULFATE 4 MG: 2 INJECTION, SOLUTION INTRAMUSCULAR; INTRAVENOUS at 20:52

## 2022-07-15 RX ADMIN — PROTAMINE SULFATE 150 MG: 10 INJECTION, SOLUTION INTRAVENOUS at 12:50

## 2022-07-15 RX ADMIN — PROPOFOL 100 MG: 10 INJECTION, EMULSION INTRAVENOUS at 08:03

## 2022-07-15 RX ADMIN — VECURONIUM BROMIDE 2 MG: 1 INJECTION, POWDER, LYOPHILIZED, FOR SOLUTION INTRAVENOUS at 10:57

## 2022-07-15 RX ADMIN — ALBUMIN (HUMAN) 12.5 G: 12.5 INJECTION, SOLUTION INTRAVENOUS at 15:28

## 2022-07-15 NOTE — ANESTHESIA PREPROCEDURE EVALUATION
Anesthetic History   No history of anesthetic complications            Review of Systems / Medical History  Patient summary reviewed, nursing notes reviewed and pertinent labs reviewed    Pulmonary          Smoker         Neuro/Psych             Comments: 07/2022  Mild heterogenous plaque at the bifurcations and internal carotid arteries bilaterally, with less than 50% stenosis Cardiovascular      Valvular problems/murmurs (mild): mitral insufficiency        Past MI and CAD      Comments: 07/2022 ECHO  estimated EF of 55 - 60%   GI/Hepatic/Renal     GERD: well controlled           Endo/Other        Arthritis     Other Findings              Physical Exam    Airway  Mallampati: II  TM Distance: 4 - 6 cm  Neck ROM: normal range of motion   Mouth opening: Normal     Cardiovascular    Rhythm: regular           Dental    Dentition: Edentulous     Pulmonary  Breath sounds clear to auscultation               Abdominal  GI exam deferred       Other Findings            Anesthetic Plan    ASA: 4  Anesthesia type: general    Monitoring Plan: Arterial line, BIS, CVP, GILDA, [place] and Continuous noninvasive hemodynamic monitoring    Post procedure ventilation     Anesthetic plan and risks discussed with: Patient and Son / Daughter

## 2022-07-15 NOTE — PROGRESS NOTES
1335- Patient arrived from OR. Patient placed on monitor. Currently V-paced @ 79. Placed on vent by RT. Gtts include Amicar, propofol 25 mcg/kg/min, epi at 1 mcg/min. Chest tubes and link draining. Pacer box connected and secondary pacing placed placed as backup. 1342- CI 1.5.    1358-albumin given. CVP 6 and stroke volume 26.8    1400- X-ray at bedside. Bear hugger placed on patient. Patient now intrinsically paced in the 70-80's however will occasionally return to complete heart block with a asystole ventriclar rate. Pacing wires functioning and V-wire set to backup of 70. Pacing pads also on patient. 1420- blood sent to lab.    1425- CI 1.7    1440- albumin given, stroke volume 33.9    1500- CI 2.3.     1528- Albumin given. 1610- CI 2.47. -120 with MAP > 75. Currently on Epi @ 2. Chest tube output WDL. Urine output >50ml/hr. Roman BUTTERFIELD called and updated on patient status. OK to wean towards extubation. Propofol turned off.     1645- Weaning trial started. Patient opens eyes and follows commands. 1715- Blood gas done prior to extubation. Edu Jo made aware of result. OK to extubate. 1725- Patient extubated to 4 LNC.     1900- Change of shift. Writer and Ana Lilia Estrella, JUMANA gave beside report to Yeny HAMILTON.

## 2022-07-15 NOTE — ANESTHESIA PROCEDURE NOTES
Arterial Line Placement    Start time: 7/15/2022 8:15 AM  End time: 7/15/2022 8:25 AM  Performed by: Zheng Granados  Authorized by: Naveed Millard MD     Pre-Procedure  Indications:  Arterial pressure monitoring and blood sampling  Preanesthetic Checklist: patient identified, risks and benefits discussed, anesthesia consent, site marked, patient being monitored, timeout performed, patient being monitored and fire risk safety assessment completed and verbalized    Timeout Time: 08:15 EDT        Procedure:   Prep:  ChloraPrep  Seldinger Technique?: Yes    Orientation:  Left  Location:  Radial artery  Catheter size:  20 G  Number of attempts:  1  Cont Cardiac Output Sensor: No      Assessment:   Post-procedure:  Line secured and sterile dressing applied  Patient Tolerance:  Patient tolerated the procedure well with no immediate complications

## 2022-07-15 NOTE — ANESTHESIA PROCEDURE NOTES
Central Line Placement    Start time: 7/15/2022 8:15 AM  End time: 7/15/2022 8:25 AM  Performed by: Jeff Busch MD  Authorized by: Jeff Busch MD     Indications: vascular access, central pressure monitoring and need for vasopressors  Preanesthetic Checklist: patient identified, risks and benefits discussed, anesthesia consent, site marked, patient being monitored, timeout performed and fire risk safety assessment completed and verbalized    Timeout Time: 08:15 EDT       Pre-procedure: All elements of maximal sterile barrier technique followed? Yes    2% Chlorhexidine for cutaneous antisepsis, Hand hygiene performed prior to catheter insertion and Ultrasound guidance    Sterile Ultrasound Technique followed?: Yes            Procedure:   Prep:  ChloraPrep  Location: internal jugular  Orientation:  Right  Patient position:  Trendelenburg  Number of lumens: Cordis.       Number of attempts:  1  Successful placement: Yes      Assessment:   Post-procedure:  Catheter secured, sterile dressing applied and sterile dressing with CHG applied  Assessment:  Free fluid flow, guidewire removal verified and blood return through all ports  Insertion:  Uncomplicated  Patient tolerance:  Patient tolerated the procedure well with no immediate complications

## 2022-07-15 NOTE — PROGRESS NOTES
Titrate RR 12/ABG results     07/15/22 1446   Patient Observations   Pulse (Heart Rate) 80   Resp Rate 12   O2 Sat (%) 100 %   Vent Settings   FIO2 (%) 40 %   SpO2/FIO2 Ratio 250   SIMV Rate Set 12   Ventilator Measurements   Resp Rate Observed 12

## 2022-07-15 NOTE — PROGRESS NOTES
1330-pt arrive to the unit with OR staff and anesthesia. Pt connect to cardiac monitoring. [phone] started spontaneous breathing trial.    1725-pt extubated by RT.  Pt placed on 4 LPM nasal cannula

## 2022-07-15 NOTE — PROGRESS NOTES
Titrate FIO2 35%, RR 12/ABG results     07/15/22 1446   Patient Observations   Pulse (Heart Rate) 80   Resp Rate 12   O2 Sat (%) 100 %   Vent Settings   FIO2 (%) 35 %   SpO2/FIO2 Ratio 285.71   SIMV Rate Set 12   Ventilator Measurements   Resp Rate Observed 12

## 2022-07-15 NOTE — PROGRESS NOTES
Titrate FIO2/ABG results     07/15/22 1400   Patient Observations   Pulse (Heart Rate) 80   Resp Rate 16   O2 Sat (%) 100 %   Vent Settings   FIO2 (%) 40 %   SpO2/FIO2 Ratio 250   Ventilator Measurements   Resp Rate Observed 16        07/15/22 1400   Patient Observations   Pulse (Heart Rate) 80   Resp Rate 16   O2 Sat (%) 100 %   Vent Settings   FIO2 (%) 40 %   SpO2/FIO2 Ratio 250   Ventilator Measurements   Resp Rate Observed 16

## 2022-07-15 NOTE — PROGRESS NOTES
Placed on Ventilator, equal BBSS        07/15/22 1339   Patient Observations   Pulse (Heart Rate) 80   Resp Rate 16   O2 Sat (%) 100 %   Airway - Endotracheal Tube 07/15/22 Oral   Placement Date/Time: 07/15/22 0805   Number of Attempts: 1  Inserted By: Griselda Stone  Location: Oral  Placement Verified: BBS;EtCO2; Auscultation  Airway Types: Endotracheal, cuffed  Airway Tube Size: 7 mm  DL Glottic View: 2  Technique: Direct Laryngoscopy . ..    Insertion Depth (cm) 23 cm   Line Brendon Lips   Side Secured Right   Cuff Pressure   (MLT)   Site Assessment Clean, dry, & intact   Respiratory   Respiratory (WDL) WDL   Ventilator Initiate/Discontinue   Ventilator Initiate Yes   Vent Settings   FIO2 (%) 60 %   SpO2/FIO2 Ratio 166.67   SIMV Rate Set 16   Back-Up Rate 16   Vt Set (ml) 420 ml   Pressure Support (cm H2O) 10 cm H2O   PEEP/VENT (cm H2O) 6 cm H20   Insp Time (sec) 0.9 sec   Insp Rise Time % 50 %   Flow Trigger 3   Expiratory Sensitivity 50 %   Ventilator Measurements   Resp Rate Observed 16   Vt Exhaled (Machine Breath) (ml) 419 ml   Ve Observed (l/min) 6.24 l/min   PIP Observed (cm H2O) 20 cm H2O   Plateau Pressure (cm H2O) 15 cm H2O   Driving Pressure 9 [id number]   MAP (cm H2O) 9.1   I:E Ratio Actual 1:3.2   Static Compliance (ml/cm H20) 47 ml/cm H20   Safety & Alarms   Circuit Temperature 98.6 °F (37 °C)   Backup Mode Checked/Apnea Yes   Pressure Max 40 cm H2O   Ve Min 2   Ve Max 20   Vt Min 200 ml   RR Max 40   Ambu Bag Yes   Ambu Mask Yes   Airway Procedures   $$ Airway Procedures Intubation   Vent Method/Mode   Ventilation Method Conventional   Ventilator Mode SIMV   $$ Ventilator Initial Initial Vent Day

## 2022-07-15 NOTE — PROGRESS NOTES
Pt is undergoing CABG today with CT surg. Discussed with CT Surg, Will switch service to Dr Fely De Jesus.

## 2022-07-15 NOTE — PROGRESS NOTES
TRANSFER - IN REPORT:    Verbal report received from Annia Estrada RN on Faisal Reed  being received from CVT Stepdown for ordered procedure      Report consisted of patients Situation, Background, Assessment and   Recommendations(SBAR). Information from the following report(s) SBAR, Kardex, Intake/Output, MAR and Pre Procedure Checklist was reviewed with the receiving nurse. Opportunity for questions and clarification was provided. Assessment completed upon patients arrival to unit and care assumed.

## 2022-07-15 NOTE — ROUTINE PROCESS
CVT PRE-PROCEDURE CHECKLIST    ****PLEASE VERIFY THAT PATIENT HAS AN INPATIENT ADMISSION ORDER****    1. Verify Allergies: No Known Allergies    2. Procedure consent signed by patient and physician and witnessed by nurse: {YES/NO:30472}    3. Anesthesia consent signed by patient and anesthesiologist and witnessed by nurse: {YES/NO:80102}    4. Blood consent signed: {YES/NO:01708}  A. Type and cross match done (date):***  B. Blood band number(place on right wrist):***  C. Number of units available:***  D. Blood transfusion history faxed to blood bank: {YES/NO:38022}    5.  2 sheets of patient labels on chart: {YES/NO:40832}    6. Patient ID bracelet and allergy bracelet on patient(place on right wrist) and readable: {YES/NO:17261}    7. NPO since midnight: {Time; am/pm:05189}    8. Vital signs obtained within 1 hour of procedure (for CABG's include both arms): {YES/NO:13161}    9. Clipped per orders at:***    10. Bathed with CHG-Chlorhexidine (the night before and the morning of- DOCUMENT) at:***    11. All jewelry, glasses, contact lenses, dentures, hearing aides removed: {YES/NO:66963}    12. IV access: #1*** #2 ***-must have 2 IV sites at least one must be an #18G    13. DP/ PT pulses marked bilaterally: {YES/NO:35945}    14. Anticoagulation stopped 4 hours prior to OR start time: date***; time***    15. Last beta blocker dose (within 12 hours of OR start) : date***; time***    16.  Blood Glucose within 60 mins of OR start time (diabetics): ***    17. UA within 30 days: {YES/NO:55539}    18. Pre-op antibiotic ordered and at bedside to hand to OR staff (do not start) {YES/NO:42662}    19. Rapid COVID test ordered and sent- put in per protocol if not already done within last 96 hours     20. NO IV's in {Left/Right:23107} arm if radial harvest is expected.       Questions-call [phone] for the on call provider

## 2022-07-15 NOTE — OP NOTES
Preoperative Diagnosis:  1. Severe Multivessel Coronary Artery     Disease  2. NSTEMI    Postoperative Diagnosis: Same    Operation: 1. Triple Coronary Artery Bypass Surgery with LIMA to LAD, Reverse Saphenous Vein Grafts to the Diagonal and Obtuse Marginal Coronary Arteries  2. Endoscopic Vein Harvesting both Lower Extremities  3. Intraoperative Transesophageal Echocardiogram    Anesthesia: GA    Surgeon: Agnieszka Dallas MD    Assistant: Shari Galeazzi, PA    Anesthesiologist: Laneta Schlatter, MD    Brief Clinical History:   57 yr old female smoker with new onset CP presented to the ED with NSTEMI and subsequent cardiac cath revealed severe multivessel CAD with preserved LV systolic function. Patient was then recommended to undergo surgical revascularization. Discussed in details with the patient and her daughter the nature of her condition, indications for surgery, alternatives, benefits, potential risks and complications. All their questions were answered, she fully understood and gave informed consent to proceed as planned. Intraoperative Findings: The ascending aorta was soft with no plaques. The overall size of the heart was normal. The LAD was 1.5 mm, Diagonal 1.5 mm and OM 1.5 mm. All the vessels were thick walled with diffuse plaque. The LIMA was 1.5 mm with good flow. The saphenous vein harvested endoscopically from the left lower extremity and right lower leg through open skip incisions were both of poor quality, small caliber 2.0 - 2.5 mm and sclerotic. The left ventricular function was good post CPB on GILDA. Procedure: The patient was brought to the operating room and placed in supine position, and after induction of general endotrachael anesthesia, and insertion of routine monitoring lines including a swan jimbo through the right IJ, the chest, abdomen and both lower extremities were prepped and draped in the usual standard fashion.  The chest was then opened through a standard midline sternotomy incision and the left internal mammary artery was then dissected off the chest wall in the usual standard fashion. Simultaneously the long saphenous vein was harvested endoscopically from the left leg and prepared for grafting. The vein was deemed borderline with most segments sclerotic and too small for bypass conduit. Therefore the long saphenous vein was also harvested from the right leg which was again of marginal quality and a small segment was deemed useable as a bypass conduit. The pericardium was next opened in the midline and suspended and after the administration of systemic heparin the ascending aorta and right atrium were cannulated and the patient was connected to the appropriate lines of the pump oxygenator and placed on total cardiopulmonary bypass and cooled to 34 degrees centigrade. The ascending aorta was next cross clamped and the heart arrested with cold blood cardioplegia injected antegrade through the aortic root and repeated intermittently after each distal. Topical hypothermia was achieved with iced slush and the heart vented through the side port of the aortic root canula. Attention was initially directed to the OM, a suitable site was chosen and 10 -12 mm arteriotomy was made and a segment of reverse saphenous vein graft was anastomosed in an end to side fashion using 7/0 prolene. Next attention was directed to the Diagonal, a suitable site was chosen and a 10 - 12 mm arteriotomy was made and another segment of reverse saphenous vein graft anastomosed in an end to side fashion with 7/0 prolene. Next attention was directed to the LAD, a suitable site was chosen and a 10 - 12 mm arteriotomy was made and the LIMA was anastomosed in an end to side fashion with 7/0 prolene. Rewarming was restarted during this last distal and on completion the aortic cross clamped was released and the heart required defibrillation with 10 Joules.  With rewarming in progress a partial occluding clamp was placed on the ascending aorta  and two 3.5 mm punch openings were made and the proximal anastomoses between the aorta and the vein grafts was constructed in an end to side fashion with 6/0 prolene. The grafts were then deaired and antegrade flow was re-established. Patient was now in normothermia and weaned off bypass with no difficulties. Patient was maintaining adequate hemodynamics and she was then decannulated and the cannulation sites repaired with previously placed purse string sutures. The heparin was reversed with protamine and adequate hemostasis was ascertained at all surgical sites. A temporary epicardial ventricular pacing wire was placed on the RV and secured. Two mediastinal and 1 left pleural chest tubes were placed and secured in the standard fashion. The pericardium was left open and the sternum closed with stainless steel wires. The rest of the incision was closed in layers in the standard fashion. The leg incisions was closed in layers in the standard fashion. The wounds were dressed and chest tubes were connected to pleuravac and suction. The sponge and instruments counts was correct. The procedure was tolerated without any complications and the patient was discharged to the CVICU in a stable condition.

## 2022-07-15 NOTE — PROGRESS NOTES
TRANSFER - OUT REPORT:    Verbal report given to Gabe, RN on Ebenezer Graves  being transferred to CVT ICU for routine post - op       Report consisted of patients Situation, Background, Assessment and   Recommendations(SBAR). Information from the following report(s) SBAR, Kardex, Procedure Summary and Intake/Output was reviewed with the receiving nurse. Lines:   Peripheral IV 07/13/22 Left Antecubital (Active)   Site Assessment Clean, dry, & intact 07/15/22 0945   Phlebitis Assessment 0 07/15/22 0945   Infiltration Assessment 0 07/14/22 0800   Dressing Status Clean, dry, & intact 07/15/22 0945   Dressing Type Transparent;Tape 07/15/22 0945   Hub Color/Line Status Pink 07/15/22 0945   Action Taken Open ports on tubing capped 07/14/22 0800   Alcohol Cap Used Yes 07/14/22 0800       Peripheral IV 07/15/22 Anterior;Proximal;Right Forearm (Active)   Site Assessment Clean, dry, & intact 07/15/22 0945   Phlebitis Assessment 0 07/15/22 0945   Dressing Status Clean, dry, & intact 07/15/22 0945   Dressing Type Transparent;Tape 07/15/22 0945   Hub Color/Line Status Pink 07/15/22 0945       Arterial Line 07/15/22 (Active)        Opportunity for questions and clarification was provided.       Patient transported with:   Monitor  Registered Nurse

## 2022-07-15 NOTE — PROGRESS NOTES
2295:  Prepped for OR by night shift. Daughter Savanna Rangel in room, has collected pt's personal valuables including cell and  and tablet, taken with daughter. CVT OR staff at bedside, transported to CVT OR via bed for surgery now. Daughter to waiting room.

## 2022-07-15 NOTE — ROUTINE PROCESS
SBAR  Report given at bedside by off going nurse, Kay Kuo, JUMANA.    5604: Shift assessment completed. 2040: OOB to [address]: Nurse reviews with patient POC for OR in the morning and protocol for prepping for procedure. 0500: New  20G IV was placed to right lower FA. Labs were drawn. 2741: HEPARIN WAS STOPPED. NURSE CALLS ON-CALL PA (ZION GE) TO CONFIRM STOPPING OF HEPARIN.    0615: CHG BATH WAS PERFORMED.     0630; BLOOD CONSENT WAS SIGNED.    0710: COVID SWAB WAS COLLECTED.     0720: SURGICAL TEAM AT BEDSIDE.   NURSE PLACED MEDS ON CHART FOR O.R.

## 2022-07-16 ENCOUNTER — APPOINTMENT (OUTPATIENT)
Dept: GENERAL RADIOLOGY | Age: 62
DRG: 234 | End: 2022-07-16
Attending: PHYSICIAN ASSISTANT
Payer: COMMERCIAL

## 2022-07-16 PROBLEM — Z95.1 S/P CABG X 3: Chronic | Status: ACTIVE | Noted: 2022-07-16

## 2022-07-16 PROBLEM — Z95.1 S/P CABG X 3: Status: ACTIVE | Noted: 2022-07-16

## 2022-07-16 LAB
ADMINISTERED INITIALS, ADMINIT: NORMAL
ANION GAP BLD CALC-SCNC: 11 MMOL/L (ref 10–20)
ANION GAP SERPL CALC-SCNC: 7 MMOL/L (ref 3–18)
ANION GAP SERPL CALC-SCNC: 7 MMOL/L (ref 3–18)
ANION GAP SERPL CALC-SCNC: 9 MMOL/L (ref 3–18)
ARTERIAL PATENCY WRIST A: ABNORMAL
ATRIAL RATE: 74 BPM
ATRIAL RATE: 79 BPM
BASE DEFICIT BLD-SCNC: 0.8 MMOL/L
BASE DEFICIT BLD-SCNC: 7.7 MMOL/L
BASE DEFICIT BLDV-SCNC: 2.8 MMOL/L
BASE DEFICIT BLDV-SCNC: 3.1 MMOL/L
BASE DEFICIT BLDV-SCNC: 3.6 MMOL/L
BASOPHILS # BLD: 0 K/UL (ref 0–0.1)
BASOPHILS NFR BLD: 0 % (ref 0–2)
BDY SITE: ABNORMAL
BODY TEMPERATURE: 98.2
BODY TEMPERATURE: 98.6
BODY TEMPERATURE: 99.3
BODY TEMPERATURE: 99.7
BUN SERPL-MCNC: 14 MG/DL (ref 7–18)
BUN SERPL-MCNC: 17 MG/DL (ref 7–18)
BUN SERPL-MCNC: 19 MG/DL (ref 7–18)
BUN/CREAT SERPL: 17 (ref 12–20)
BUN/CREAT SERPL: 19 (ref 12–20)
BUN/CREAT SERPL: 23 (ref 12–20)
CA-I BLD-MCNC: 1.12 MMOL/L (ref 1.12–1.32)
CALCIUM SERPL-MCNC: 7.6 MG/DL (ref 8.5–10.1)
CALCIUM SERPL-MCNC: 7.8 MG/DL (ref 8.5–10.1)
CALCIUM SERPL-MCNC: 7.9 MG/DL (ref 8.5–10.1)
CALCULATED P AXIS, ECG09: 70 DEGREES
CALCULATED P AXIS, ECG09: 84 DEGREES
CALCULATED R AXIS, ECG10: -64 DEGREES
CALCULATED R AXIS, ECG10: -69 DEGREES
CALCULATED T AXIS, ECG11: 37 DEGREES
CALCULATED T AXIS, ECG11: 50 DEGREES
CHLORIDE BLD-SCNC: 107 MMOL/L (ref 98–107)
CHLORIDE SERPL-SCNC: 112 MMOL/L (ref 100–111)
CHLORIDE SERPL-SCNC: 113 MMOL/L (ref 100–111)
CHLORIDE SERPL-SCNC: 114 MMOL/L (ref 100–111)
CO2 BLD-SCNC: 26 MMOL/L (ref 19–24)
CO2 SERPL-SCNC: 22 MMOL/L (ref 21–32)
CO2 SERPL-SCNC: 23 MMOL/L (ref 21–32)
CO2 SERPL-SCNC: 25 MMOL/L (ref 21–32)
CREAT BLD-MCNC: 0.93 MG/DL (ref 0.6–1.3)
CREAT SERPL-MCNC: 0.73 MG/DL (ref 0.6–1.3)
CREAT SERPL-MCNC: 0.82 MG/DL (ref 0.6–1.3)
CREAT SERPL-MCNC: 0.99 MG/DL (ref 0.6–1.3)
D50 ADMINISTERED, D50ADM: 0 ML
D50 ORDER, D50ORD: 0 ML
DIAGNOSIS, 93000: NORMAL
DIAGNOSIS, 93000: NORMAL
DIFFERENTIAL METHOD BLD: ABNORMAL
EOSINOPHIL # BLD: 0 K/UL (ref 0–0.4)
EOSINOPHIL NFR BLD: 0 % (ref 0–5)
ERYTHROCYTE [DISTWIDTH] IN BLOOD BY AUTOMATED COUNT: 12.9 % (ref 11.6–14.5)
ERYTHROCYTE [DISTWIDTH] IN BLOOD BY AUTOMATED COUNT: 13.6 % (ref 11.6–14.5)
GAS FLOW.O2 O2 DELIVERY SYS: ABNORMAL L/MIN
GLUCOSE BLD STRIP.AUTO-MCNC: 101 MG/DL (ref 70–110)
GLUCOSE BLD STRIP.AUTO-MCNC: 101 MG/DL (ref 70–110)
GLUCOSE BLD STRIP.AUTO-MCNC: 109 MG/DL (ref 70–110)
GLUCOSE BLD STRIP.AUTO-MCNC: 126 MG/DL (ref 70–110)
GLUCOSE BLD STRIP.AUTO-MCNC: 137 MG/DL (ref 70–110)
GLUCOSE BLD STRIP.AUTO-MCNC: 149 MG/DL (ref 70–110)
GLUCOSE BLD STRIP.AUTO-MCNC: 150 MG/DL (ref 70–110)
GLUCOSE BLD STRIP.AUTO-MCNC: 156 MG/DL (ref 70–110)
GLUCOSE BLD STRIP.AUTO-MCNC: 178 MG/DL (ref 70–110)
GLUCOSE BLD STRIP.AUTO-MCNC: 93 MG/DL (ref 70–110)
GLUCOSE BLD STRIP.AUTO-MCNC: 96 MG/DL (ref 70–110)
GLUCOSE BLD-MCNC: 116 MG/DL (ref 65–100)
GLUCOSE SERPL-MCNC: 105 MG/DL (ref 74–99)
GLUCOSE SERPL-MCNC: 141 MG/DL (ref 74–99)
GLUCOSE SERPL-MCNC: 150 MG/DL (ref 74–99)
GLUCOSE, GLC: 101 MG/DL
GLUCOSE, GLC: 101 MG/DL
GLUCOSE, GLC: 109 MG/DL
GLUCOSE, GLC: 116 MG/DL
GLUCOSE, GLC: 126 MG/DL
GLUCOSE, GLC: 149 MG/DL
GLUCOSE, GLC: 150 MG/DL
GLUCOSE, GLC: 156 MG/DL
GLUCOSE, GLC: 178 MG/DL
GLUCOSE, GLC: 93 MG/DL
HCO3 BLD-SCNC: 18 MMOL/L (ref 22–26)
HCO3 BLD-SCNC: 25.3 MMOL/L (ref 22–26)
HCO3 BLDV-SCNC: 21.6 MMOL/L (ref 23–28)
HCO3 BLDV-SCNC: 22.4 MMOL/L (ref 23–28)
HCO3 BLDV-SCNC: 22.8 MMOL/L (ref 23–28)
HCT VFR BLD AUTO: 21.2 % (ref 35–45)
HCT VFR BLD AUTO: 23.9 % (ref 35–45)
HCT VFR BLD AUTO: 27.2 % (ref 35–45)
HGB BLD-MCNC: 7.2 G/DL (ref 12–16)
HGB BLD-MCNC: 8.2 G/DL (ref 12–16)
HGB BLD-MCNC: 9.1 G/DL (ref 12–16)
HIGH TARGET, HITG: 150 MG/DL
HISTORY CHECKED?,CKHIST: NORMAL
IMM GRANULOCYTES # BLD AUTO: 0.1 K/UL (ref 0–0.04)
IMM GRANULOCYTES NFR BLD AUTO: 0 % (ref 0–0.5)
INSULIN ADMINSTERED, INSADM: 1.7 UNITS/HOUR
INSULIN ADMINSTERED, INSADM: 2.1 UNITS/HOUR
INSULIN ADMINSTERED, INSADM: 2.1 UNITS/HOUR
INSULIN ADMINSTERED, INSADM: 2.5 UNITS/HOUR
INSULIN ADMINSTERED, INSADM: 2.7 UNITS/HOUR
INSULIN ADMINSTERED, INSADM: 2.8 UNITS/HOUR
INSULIN ADMINSTERED, INSADM: 3.3 UNITS/HOUR
INSULIN ADMINSTERED, INSADM: 4.5 UNITS/HOUR
INSULIN ADMINSTERED, INSADM: 4.7 UNITS/HOUR
INSULIN ADMINSTERED, INSADM: 4.8 UNITS/HOUR
INSULIN ORDER, INSORD: 1.7 UNITS/HOUR
INSULIN ORDER, INSORD: 2.1 UNITS/HOUR
INSULIN ORDER, INSORD: 2.1 UNITS/HOUR
INSULIN ORDER, INSORD: 2.5 UNITS/HOUR
INSULIN ORDER, INSORD: 2.7 UNITS/HOUR
INSULIN ORDER, INSORD: 2.8 UNITS/HOUR
INSULIN ORDER, INSORD: 3.3 UNITS/HOUR
INSULIN ORDER, INSORD: 4.5 UNITS/HOUR
INSULIN ORDER, INSORD: 4.7 UNITS/HOUR
INSULIN ORDER, INSORD: 4.8 UNITS/HOUR
LACTATE BLD-SCNC: 3.07 MMOL/L (ref 0.4–2)
LACTATE SERPL-SCNC: 2.4 MMOL/L (ref 0.4–2)
LACTATE SERPL-SCNC: 2.8 MMOL/L (ref 0.4–2)
LACTATE SERPL-SCNC: 3 MMOL/L (ref 0.4–2)
LEFT CCA DIST DIAS: 17.1 CM/S
LEFT CCA DIST SYS: 65.1 CM/S
LEFT CCA MID DIAS: 18.89 CM/S
LEFT CCA MID SYS: 75.81 CM/S
LEFT CCA PROX DIAS: 18.9 CM/S
LEFT CCA PROX SYS: 96.5 CM/S
LEFT ECA DIAS: 13.21 CM/S
LEFT ECA SYS: 55.4 CM/S
LEFT ICA DIST DIAS: 17.9 CM/S
LEFT ICA DIST SYS: 80.2 CM/S
LEFT ICA MID DIAS: 24.9 CM/S
LEFT ICA MID SYS: 81 CM/S
LEFT ICA PROX DIAS: 11.9 CM/S
LEFT ICA PROX SYS: 66 CM/S
LEFT ICA/CCA SYS: 1.24 NO UNITS
LEFT VERTEBRAL DIAS: 11.97 CM/S
LEFT VERTEBRAL SYS: 48 CM/S
LOW TARGET, LOT: 80 MG/DL
LYMPHOCYTES # BLD: 1.9 K/UL (ref 0.9–3.6)
LYMPHOCYTES NFR BLD: 10 % (ref 21–52)
MAGNESIUM SERPL-MCNC: 2.2 MG/DL (ref 1.6–2.6)
MCH RBC QN AUTO: 30.8 PG (ref 24–34)
MCH RBC QN AUTO: 31.5 PG (ref 24–34)
MCHC RBC AUTO-ENTMCNC: 33.5 G/DL (ref 31–37)
MCHC RBC AUTO-ENTMCNC: 34.3 G/DL (ref 31–37)
MCV RBC AUTO: 91.9 FL (ref 78–100)
MCV RBC AUTO: 92.2 FL (ref 78–100)
MINUTES UNTIL NEXT BG, NBG: 60 MIN
MONOCYTES # BLD: 2.1 K/UL (ref 0.05–1.2)
MONOCYTES NFR BLD: 11 % (ref 3–10)
MULTIPLIER, MUL: 0.03
MULTIPLIER, MUL: 0.04
MULTIPLIER, MUL: 0.05
NEUTS SEG # BLD: 14.7 K/UL (ref 1.8–8)
NEUTS SEG NFR BLD: 78 % (ref 40–73)
NRBC # BLD: 0 K/UL (ref 0–0.01)
NRBC # BLD: 0 K/UL (ref 0–0.01)
NRBC BLD-RTO: 0 PER 100 WBC
NRBC BLD-RTO: 0 PER 100 WBC
O2/TOTAL GAS SETTING VFR VENT: 3 %
ORDER INITIALS, ORDINIT: NORMAL
P-R INTERVAL, ECG05: 146 MS
P-R INTERVAL, ECG05: 146 MS
PCO2 BLD: 36.9 MMHG (ref 35–45)
PCO2 BLDV: 39.2 MMHG (ref 41–51)
PCO2 BLDV: 41.3 MMHG (ref 41–51)
PCO2 BLDV: 43.1 MMHG (ref 41–51)
PCO2 BLDV: 49.1 MMHG (ref 41–51)
PH BLD: 7.3 [PH] (ref 7.35–7.45)
PH BLDV: 7.32 [PH] (ref 7.32–7.42)
PH BLDV: 7.33 [PH] (ref 7.32–7.42)
PH BLDV: 7.34 [PH] (ref 7.32–7.42)
PH BLDV: 7.35 [PH] (ref 7.32–7.42)
PLATELET # BLD AUTO: 109 K/UL (ref 135–420)
PLATELET # BLD AUTO: 144 K/UL (ref 135–420)
PMV BLD AUTO: 11.7 FL (ref 9.2–11.8)
PMV BLD AUTO: 11.8 FL (ref 9.2–11.8)
PO2 BLD: 94 MMHG (ref 80–100)
PO2 BLDV: 20 MMHG (ref 25–40)
PO2 BLDV: 24 MMHG (ref 25–40)
PO2 BLDV: 32 MMHG (ref 25–40)
PO2 BLDV: 37 MMHG (ref 25–40)
POTASSIUM BLD-SCNC: 3.8 MMOL/L (ref 3.5–5.1)
POTASSIUM SERPL-SCNC: 4 MMOL/L (ref 3.5–5.5)
POTASSIUM SERPL-SCNC: 4.3 MMOL/L (ref 3.5–5.5)
POTASSIUM SERPL-SCNC: 4.4 MMOL/L (ref 3.5–5.5)
Q-T INTERVAL, ECG07: 436 MS
Q-T INTERVAL, ECG07: 462 MS
QRS DURATION, ECG06: 110 MS
QRS DURATION, ECG06: 114 MS
QTC CALCULATION (BEZET), ECG08: 483 MS
QTC CALCULATION (BEZET), ECG08: 529 MS
RBC # BLD AUTO: 2.6 M/UL (ref 4.2–5.3)
RBC # BLD AUTO: 2.95 M/UL (ref 4.2–5.3)
RIGHT CCA DIST DIAS: 16.1 CM/S
RIGHT CCA DIST SYS: 67.7 CM/S
RIGHT CCA MID DIAS: 16.05 CM/S
RIGHT CCA MID SYS: 83.1 CM/S
RIGHT CCA PROX DIAS: 13.9 CM/S
RIGHT CCA PROX SYS: 82 CM/S
RIGHT ECA DIAS: 5.64 CM/S
RIGHT ECA SYS: 44.6 CM/S
RIGHT ICA DIST DIAS: 28.5 CM/S
RIGHT ICA DIST SYS: 89.5 CM/S
RIGHT ICA MID DIAS: 21.5 CM/S
RIGHT ICA MID SYS: 75.6 CM/S
RIGHT ICA PROX DIAS: 15.2 CM/S
RIGHT ICA PROX SYS: 45.8 CM/S
RIGHT ICA/CCA SYS: 1.3 NO UNITS
RIGHT VERTEBRAL DIAS: 11.12 CM/S
RIGHT VERTEBRAL SYS: 55.5 CM/S
SAO2 % BLD: 37 %
SAO2 % BLD: 96.4 % (ref 92–97)
SAO2 % BLDV: 29.3 % (ref 65–88)
SAO2 % BLDV: 55.7 % (ref 65–88)
SAO2 % BLDV: 66.3 % (ref 65–88)
SERVICE CMNT-IMP: ABNORMAL
SODIUM BLD-SCNC: 143 MMOL/L (ref 136–145)
SODIUM SERPL-SCNC: 144 MMOL/L (ref 136–145)
SPECIMEN SITE: ABNORMAL
SPECIMEN TYPE: ABNORMAL
VAS LEFT SUBCLAVIAN PROX EDV: 0 CM/S
VAS LEFT SUBCLAVIAN PROX PSV: 48.8 CM/S
VAS RIGHT SUBCLAVIAN PROX EDV: 0 CM/S
VAS RIGHT SUBCLAVIAN PROX PSV: 81.4 CM/S
VENTRICULAR RATE, ECG03: 74 BPM
VENTRICULAR RATE, ECG03: 79 BPM
WBC # BLD AUTO: 13.5 K/UL (ref 4.6–13.2)
WBC # BLD AUTO: 18.8 K/UL (ref 4.6–13.2)

## 2022-07-16 PROCEDURE — 65620000000 HC RM CCU GENERAL

## 2022-07-16 PROCEDURE — APPNB180 APP NON BILLABLE TIME > 60 MINS: Performed by: PHYSICIAN ASSISTANT

## 2022-07-16 PROCEDURE — 74011250636 HC RX REV CODE- 250/636: Performed by: ANESTHESIOLOGY

## 2022-07-16 PROCEDURE — 74018 RADEX ABDOMEN 1 VIEW: CPT

## 2022-07-16 PROCEDURE — 36430 TRANSFUSION BLD/BLD COMPNT: CPT

## 2022-07-16 PROCEDURE — P9016 RBC LEUKOCYTES REDUCED: HCPCS

## 2022-07-16 PROCEDURE — 71045 X-RAY EXAM CHEST 1 VIEW: CPT

## 2022-07-16 PROCEDURE — 82803 BLOOD GASES ANY COMBINATION: CPT

## 2022-07-16 PROCEDURE — 83735 ASSAY OF MAGNESIUM: CPT

## 2022-07-16 PROCEDURE — 85018 HEMOGLOBIN: CPT

## 2022-07-16 PROCEDURE — 85027 COMPLETE CBC AUTOMATED: CPT

## 2022-07-16 PROCEDURE — 83605 ASSAY OF LACTIC ACID: CPT

## 2022-07-16 PROCEDURE — 99024 POSTOP FOLLOW-UP VISIT: CPT | Performed by: PHYSICIAN ASSISTANT

## 2022-07-16 PROCEDURE — 74011250637 HC RX REV CODE- 250/637: Performed by: INTERNAL MEDICINE

## 2022-07-16 PROCEDURE — P9045 ALBUMIN (HUMAN), 5%, 250 ML: HCPCS | Performed by: PHYSICIAN ASSISTANT

## 2022-07-16 PROCEDURE — 80048 BASIC METABOLIC PNL TOTAL CA: CPT

## 2022-07-16 PROCEDURE — 74011636637 HC RX REV CODE- 636/637: Performed by: PHYSICIAN ASSISTANT

## 2022-07-16 PROCEDURE — 82962 GLUCOSE BLOOD TEST: CPT

## 2022-07-16 PROCEDURE — 99232 SBSQ HOSP IP/OBS MODERATE 35: CPT | Performed by: INTERNAL MEDICINE

## 2022-07-16 PROCEDURE — 30233N1 TRANSFUSION OF NONAUTOLOGOUS RED BLOOD CELLS INTO PERIPHERAL VEIN, PERCUTANEOUS APPROACH: ICD-10-PCS | Performed by: PHYSICIAN ASSISTANT

## 2022-07-16 PROCEDURE — 74011000258 HC RX REV CODE- 258: Performed by: PHYSICIAN ASSISTANT

## 2022-07-16 PROCEDURE — 93005 ELECTROCARDIOGRAM TRACING: CPT

## 2022-07-16 PROCEDURE — 85025 COMPLETE CBC W/AUTO DIFF WBC: CPT

## 2022-07-16 PROCEDURE — 74011250636 HC RX REV CODE- 250/636

## 2022-07-16 PROCEDURE — 82947 ASSAY GLUCOSE BLOOD QUANT: CPT

## 2022-07-16 PROCEDURE — 74011000250 HC RX REV CODE- 250: Performed by: PHYSICIAN ASSISTANT

## 2022-07-16 PROCEDURE — 74011250637 HC RX REV CODE- 250/637: Performed by: PHYSICIAN ASSISTANT

## 2022-07-16 PROCEDURE — P9045 ALBUMIN (HUMAN), 5%, 250 ML: HCPCS

## 2022-07-16 PROCEDURE — 74011250636 HC RX REV CODE- 250/636: Performed by: PHYSICIAN ASSISTANT

## 2022-07-16 RX ORDER — FUROSEMIDE 10 MG/ML
20 INJECTION INTRAMUSCULAR; INTRAVENOUS ONCE
Status: COMPLETED | OUTPATIENT
Start: 2022-07-16 | End: 2022-07-16

## 2022-07-16 RX ORDER — MAGNESIUM SULFATE 100 %
4 CRYSTALS MISCELLANEOUS AS NEEDED
Status: DISCONTINUED | OUTPATIENT
Start: 2022-07-16 | End: 2022-07-25 | Stop reason: HOSPADM

## 2022-07-16 RX ORDER — MORPHINE SULFATE 2 MG/ML
2-4 INJECTION, SOLUTION INTRAMUSCULAR; INTRAVENOUS
Status: DISCONTINUED | OUTPATIENT
Start: 2022-07-16 | End: 2022-07-25 | Stop reason: HOSPADM

## 2022-07-16 RX ORDER — ALBUMIN HUMAN 50 G/1000ML
SOLUTION INTRAVENOUS
Status: COMPLETED
Start: 2022-07-16 | End: 2022-07-16

## 2022-07-16 RX ORDER — SODIUM CHLORIDE 9 MG/ML
250 INJECTION, SOLUTION INTRAVENOUS AS NEEDED
Status: DISCONTINUED | OUTPATIENT
Start: 2022-07-16 | End: 2022-07-17

## 2022-07-16 RX ORDER — SODIUM CHLORIDE, SODIUM LACTATE, POTASSIUM CHLORIDE, CALCIUM CHLORIDE 600; 310; 30; 20 MG/100ML; MG/100ML; MG/100ML; MG/100ML
75 INJECTION, SOLUTION INTRAVENOUS CONTINUOUS
Status: DISCONTINUED | OUTPATIENT
Start: 2022-07-16 | End: 2022-07-18

## 2022-07-16 RX ORDER — ALBUMIN HUMAN 250 G/1000ML
12.5 SOLUTION INTRAVENOUS ONCE
Status: ACTIVE | OUTPATIENT
Start: 2022-07-16 | End: 2022-07-16

## 2022-07-16 RX ORDER — INSULIN LISPRO 100 [IU]/ML
INJECTION, SOLUTION INTRAVENOUS; SUBCUTANEOUS
Status: DISCONTINUED | OUTPATIENT
Start: 2022-07-16 | End: 2022-07-25 | Stop reason: HOSPADM

## 2022-07-16 RX ORDER — DEXTROSE MONOHYDRATE 100 MG/ML
0-250 INJECTION, SOLUTION INTRAVENOUS AS NEEDED
Status: DISCONTINUED | OUTPATIENT
Start: 2022-07-16 | End: 2022-07-25 | Stop reason: HOSPADM

## 2022-07-16 RX ORDER — SODIUM BICARBONATE 1 MEQ/ML
SYRINGE (ML) INTRAVENOUS
Status: DISPENSED
Start: 2022-07-16 | End: 2022-07-16

## 2022-07-16 RX ORDER — NITROGLYCERIN 40 MG/100ML
10 INJECTION INTRAVENOUS CONTINUOUS
Status: DISCONTINUED | OUTPATIENT
Start: 2022-07-16 | End: 2022-07-17

## 2022-07-16 RX ORDER — ALBUMIN HUMAN 50 G/1000ML
12.5 SOLUTION INTRAVENOUS ONCE
Status: COMPLETED | OUTPATIENT
Start: 2022-07-16 | End: 2022-07-16

## 2022-07-16 RX ORDER — INSULIN GLARGINE 100 [IU]/ML
10 INJECTION, SOLUTION SUBCUTANEOUS DAILY
Status: DISCONTINUED | OUTPATIENT
Start: 2022-07-16 | End: 2022-07-20

## 2022-07-16 RX ORDER — NITROGLYCERIN 40 MG/100ML
10 INJECTION INTRAVENOUS CONTINUOUS
Status: DISCONTINUED | OUTPATIENT
Start: 2022-07-16 | End: 2022-07-16 | Stop reason: SDUPTHER

## 2022-07-16 RX ORDER — ALBUMIN HUMAN 50 G/1000ML
12.5 SOLUTION INTRAVENOUS ONCE
Status: COMPLETED | OUTPATIENT
Start: 2022-07-17 | End: 2022-07-17

## 2022-07-16 RX ORDER — SODIUM BICARBONATE 1 MEQ/ML
50 SYRINGE (ML) INTRAVENOUS ONCE
Status: COMPLETED | OUTPATIENT
Start: 2022-07-16 | End: 2022-07-16

## 2022-07-16 RX ORDER — FACIAL-BODY WIPES
10 EACH TOPICAL DAILY
Status: DISCONTINUED | OUTPATIENT
Start: 2022-07-16 | End: 2022-07-17

## 2022-07-16 RX ORDER — LIDOCAINE HYDROCHLORIDE 20 MG/ML
15 SOLUTION OROPHARYNGEAL AS NEEDED
Status: DISCONTINUED | OUTPATIENT
Start: 2022-07-16 | End: 2022-07-25 | Stop reason: HOSPADM

## 2022-07-16 RX ADMIN — SODIUM CHLORIDE 4.7 UNITS/HR: 9 INJECTION, SOLUTION INTRAVENOUS at 02:29

## 2022-07-16 RX ADMIN — FAMOTIDINE 20 MG: 20 TABLET ORAL at 09:56

## 2022-07-16 RX ADMIN — MUPIROCIN: 20 OINTMENT TOPICAL at 09:56

## 2022-07-16 RX ADMIN — BISACODYL 10 MG: 5 TABLET, COATED ORAL at 09:55

## 2022-07-16 RX ADMIN — MUPIROCIN: 20 OINTMENT TOPICAL at 17:51

## 2022-07-16 RX ADMIN — SODIUM CHLORIDE, PRESERVATIVE FREE 10 ML: 5 INJECTION INTRAVENOUS at 14:30

## 2022-07-16 RX ADMIN — SODIUM CHLORIDE 4.8 UNITS/HR: 9 INJECTION, SOLUTION INTRAVENOUS at 03:26

## 2022-07-16 RX ADMIN — MORPHINE SULFATE 4 MG: 2 INJECTION, SOLUTION INTRAMUSCULAR; INTRAVENOUS at 00:00

## 2022-07-16 RX ADMIN — MORPHINE SULFATE 2 MG: 2 INJECTION, SOLUTION INTRAMUSCULAR; INTRAVENOUS at 19:17

## 2022-07-16 RX ADMIN — DOCUSATE SODIUM 100 MG: 100 CAPSULE, LIQUID FILLED ORAL at 09:56

## 2022-07-16 RX ADMIN — ATORVASTATIN CALCIUM 40 MG: 40 TABLET, FILM COATED ORAL at 12:12

## 2022-07-16 RX ADMIN — ONDANSETRON 4 MG: 2 INJECTION INTRAMUSCULAR; INTRAVENOUS at 02:48

## 2022-07-16 RX ADMIN — ALBUMIN (HUMAN) 12.5 G: 12.5 INJECTION, SOLUTION INTRAVENOUS at 05:52

## 2022-07-16 RX ADMIN — CHLORHEXIDINE GLUCONATE 0.12% ORAL RINSE 10 ML: 1.2 LIQUID ORAL at 09:55

## 2022-07-16 RX ADMIN — CEFAZOLIN 2 G: 1 INJECTION, POWDER, FOR SOLUTION INTRAMUSCULAR; INTRAVENOUS at 06:47

## 2022-07-16 RX ADMIN — BISACODYL 10 MG: 10 SUPPOSITORY RECTAL at 16:42

## 2022-07-16 RX ADMIN — CEFAZOLIN 2 G: 1 INJECTION, POWDER, FOR SOLUTION INTRAMUSCULAR; INTRAVENOUS at 14:12

## 2022-07-16 RX ADMIN — CHLORHEXIDINE GLUCONATE 0.12% ORAL RINSE 10 ML: 1.2 LIQUID ORAL at 17:51

## 2022-07-16 RX ADMIN — POLYETHYLENE GLYCOL 3350 17 G: 17 POWDER, FOR SOLUTION ORAL at 09:55

## 2022-07-16 RX ADMIN — SODIUM CHLORIDE 4.5 UNITS/HR: 9 INJECTION, SOLUTION INTRAVENOUS at 04:26

## 2022-07-16 RX ADMIN — SODIUM CHLORIDE 2.7 UNITS/HR: 9 INJECTION, SOLUTION INTRAVENOUS at 00:32

## 2022-07-16 RX ADMIN — SODIUM CHLORIDE, PRESERVATIVE FREE 10 ML: 5 INJECTION INTRAVENOUS at 06:00

## 2022-07-16 RX ADMIN — EPINEPHRINE 8 MCG/MIN: 1 INJECTION INTRAMUSCULAR; INTRAVENOUS; SUBCUTANEOUS at 06:37

## 2022-07-16 RX ADMIN — ALBUMIN (HUMAN) 12.5 G: 12.5 INJECTION, SOLUTION INTRAVENOUS at 09:55

## 2022-07-16 RX ADMIN — SODIUM CHLORIDE 4.5 UNITS/HR: 9 INJECTION, SOLUTION INTRAVENOUS at 07:11

## 2022-07-16 RX ADMIN — FUROSEMIDE 20 MG: 10 INJECTION, SOLUTION INTRAMUSCULAR; INTRAVENOUS at 18:27

## 2022-07-16 RX ADMIN — SODIUM CHLORIDE, SODIUM LACTATE, POTASSIUM CHLORIDE, AND CALCIUM CHLORIDE 75 ML/HR: 600; 310; 30; 20 INJECTION, SOLUTION INTRAVENOUS at 16:50

## 2022-07-16 RX ADMIN — AMIODARONE HYDROCHLORIDE 1 MG/MIN: 1.8 INJECTION, SOLUTION INTRAVENOUS at 11:42

## 2022-07-16 RX ADMIN — EPINEPHRINE 4 MCG/MIN: 1 INJECTION INTRAMUSCULAR; INTRAVENOUS; SUBCUTANEOUS at 12:17

## 2022-07-16 RX ADMIN — Medication 10 UNITS: at 12:20

## 2022-07-16 RX ADMIN — SODIUM BICARBONATE 50 MEQ: 84 INJECTION INTRAVENOUS at 08:40

## 2022-07-16 RX ADMIN — ASPIRIN 81 MG: 81 TABLET, COATED ORAL at 09:56

## 2022-07-16 NOTE — PROGRESS NOTES
1900-Bedside and Verbal shift change report given to This Writer-RN  by Martha Mera and Gabe GONZALEZ RN. Report included the following information SBAR, Procedure Summary, Intake/Output, MAR, Recent Results, Med Rec Status, Cardiac Rhythm  , Alarm Parameters , Quality Measures and Dual Neuro Assessment. 2252-[facility] labs chemistries, ABG's, Lactic, and Magnesium drawn for lab to result per [facility]     2330 Amioderone drip started as ordered for 19 beat run of V. Tach. Remained alert and awake. No complaints of pain or symptoms. 0248-Vomited clear water emesis x 1 . Zofran 4mg prn given as ordered. No further N/V after 10 minutes. 0535 Decrease in urine output BP 96/52. KRISSY Topete called and N.o given for Albumin 12.5g bolus  as given per this writer. Patient with frequent position changes in bed and reports difficulty getting comfortable. Continue to reposition. Continue to monitor. Epinephrine drip titrated as indicated for BP effect. 4846-55 Lead EkG, AM labs,  X-Ray, and Hemodynamics completed  Continue to monitor. No further N/V    9155 Patient appears to be resting comfortably with improvement in BP with Epinephrine titration and post Albumin administration. 0700-Bedside and Verbal shift change report given to Senait--RN  by This Writer. Report included the following information SBAR, Intake/Output, MAR, Recent Results, Med Rec Status, Cardiac Rhythm   and Quality Measures.

## 2022-07-16 NOTE — CONSULTS
Comprehensive Nutrition Assessment    Type and Reason for Visit: Reassess,Positive nutrition screen    Nutrition Recommendations/Plan:   1. Continue nutrition interventions previously prescribed. 2. Monitor PO intake, compliance with oral supplement, weight and plan of care during admission. Malnutrition Assessment:  Malnutrition Status:  No malnutrition (07/14/22 1246)      Nutrition Assessment:    Visited pt in room, laying in bed, resting. Followed up with RN. RN reported pt extubated today and just transition to Clear Liquids from NPO following surgery. RN also reported pt will be transitioned to Regular Diet this afternoon. Per MD note, CABG performed on 7/15 and KUB with large gastric dil, stool in left colon to hepatic flexture; no A/F levels; NGT to LWIS (suction). NG tube placed 7/16 per RN note. Nutrition Related Findings:    Last BM 7/14. Output: 70mL (urine). Pertinent Medications: sodium chloride NS 10mL/hour, buminate, Lipitor, Dulcolax, colace, lovenox, Pepcid, lantus, Humalog, Zofran, miralax, sodium bicarb, Wound Type: None    Current Nutrition Intake & Therapies:  Average Meal Intake: 51-75%  Average Supplement Intake: %  ADULT DIET Regular; 4 carb choices (60 gm/meal); No Salt Added (3-4 gm); No Concentrated Sweets; Dislikes scrambled eggs    Anthropometric Measures:  Height: 5' 2\" (157.5 cm)  Ideal Body Weight (IBW): 110 lbs (50 kg)  Admission Body Weight: 136 lb (No source)  Current Body Wt:  51.8 kg (114 lb 3.2 oz), 103.8 % IBW. Standing scale  Current BMI (kg/m2): 20.9  Usual Body Weight: 53.5 kg (118 lb) (per pt report)  % Weight Change (Calculated): -2.5  BMI Category: Normal weight (BMI 18.5-24. 9)    Estimated Daily Nutrient Needs:  Energy Requirements Based On: Kcal/kg (25-30 kcal/kg)  Weight Used for Energy Requirements: Current  Energy (kcal/day): 1305 - 1566  Weight Used for Protein Requirements: Current (.8-1)  Protein (g/day): 42 - 52  Method Used for Fluid Requirements: 1 ml/kcal  Fluid (ml/day): 1305 - 1566    Nutrition Diagnosis:   · Increased nutrient needs related to cardiac dysfunction as evidenced by intake 51-75%      Nutrition Interventions:   Food and/or Nutrient Delivery: Continue current diet,Continue oral nutrition supplement  Nutrition Education/Counseling: Education not indicated,No recommendations at this time  Coordination of Nutrition Care: Continue to monitor while inpatient  Plan of Care discussed with: Patient    Goals:  Previous Goal Met: Progressing toward goal(s)  Goals: Meet at least 75% of estimated needs,by next RD assessment       Nutrition Monitoring and Evaluation:   Behavioral-Environmental Outcomes: None identified  Food/Nutrient Intake Outcomes: Diet advancement/tolerance,Food and nutrient intake,Supplement intake  Physical Signs/Symptoms Outcomes: Biochemical data,Nutrition focused physical findings,Weight,GI status,Meal time behavior    Discharge Planning:    Continue current diet,Continue oral nutrition supplement    Pratibha Hahn MA, RDN, LD   Contact: [phone]

## 2022-07-16 NOTE — PROGRESS NOTES
PHYSICAL THERAPY CONSULT RECEIVED and chart reviewed. Discussed with nursing who requests hold consult today. Plan to follow and assess at next available time. Thank you,  Janina Quispe.  Erika Allen

## 2022-07-16 NOTE — PROGRESS NOTES
[facility]         KUB with large gastric dil, stool in left colon to hepatic flexture and no A/F levels. NPO, NGT to LWIS, dulcolax suppositories.

## 2022-07-16 NOTE — PROGRESS NOTES
Wound Prevention Checklist    Patient: Óscar Donato (37 y.o. female)  Date: 7/16/2022  Diagnosis: NSTEMI (non-ST elevated myocardial infarction) ([facility] [address] [I21.4] S/P CABG x 3    Precautions:   Skin breakdown, s/p CABG x 3,      []  Heel prevention boots placed on patient    [x]  Patient turned q2h during shift    []  Lift team ordered    [x]  Patient on [place] bed/Specialty bed    [x]  Each Wound is documented during shift (Stage, Color, drainage, odor, measurements, and dressings)    [x]  Dual skin checks done at bedside during shift report with Bettina HAMILTON S Gold Flores. **Full posterior skin assessment deferred this morning due to patient status. Surgical wounds however reviewed with off going RN.      Lawrence Holder RN

## 2022-07-16 NOTE — PROGRESS NOTES
0700- Change of shift. Bedside report received from Florecita Jerome, [address] [facility]. Will assume care of the patient. 1098- Amio to 0.5 mg/min per order. Epi to 5 mcg/min  0730- Epi to 4 mcg/min. CI 1.78  0830Janece Hamman, PA called and given update. Order to give 1 amp sodium bicarb, draw mixed venous and repeat cardiac profile 20 minutes after. 0910-  CI 1.9 with stroke volume 38.5, SVR 1796. Mixed venous done with cSO2 36.8%. Lactic acid 3.07. [facility] given information. Order received for albumin x 1, recheck Hgb/ Hct.   1120- CI 1.9  1155- mixed venous done. cSO2 how 55.7%. KUB also done. 1205-Roger, notified of above. Per provider patient will need 1 unit PRBC and NG tube decompression. Patient to be NPO.   1005- Blood product started. 1445- NG tube placed by self. KUB order placed to confirm placement. 1525- 1 unit pRBC completed. 1600- CI 1.4, stroke volume 34. 0. mixed venous 29.3%. [facility] and updated. Orders received for repeat CBC, BMP, lactated ringers @ 75cc/hr. 1623- Repeat 12 lead done and showing junctional rhythm with rate of 60. Pacer changed from backup rate of 54 to 90 bmp. Juan Jose Wagner updated. Order to turn OFF amio. Nursing staff to call provider and check electrolytes if V ectopy occurs. 1710- While resting patients SBP increased to 180's. Epi paused for several minutes then resumed at 2 mcg/min once blood pressures normalized. 1734- CI 1.74, mixed venous 66.3%  1853- Change of shift. Bedside report given to Florecita Jerome, [address] RN.

## 2022-07-16 NOTE — PROGRESS NOTES
Cardiology Progress Note    Admit Date: 7/13/2022  Attending Cardiologist: Dr. Jie Sena:     -NSTEMI. Given  mg and SL NTG x 1 at Urgent Care. Cardiac cath 7/13/2022 with findings as follows:  · LM: Angiographically normal.  · LAD: Mid LAD has diffuse up to 70-75% long disease. Distal vessel appeared approximately 2 mm in size without any significant obstructive disease. Diagonal branch with 99% ostial disease, bifurcating vessel, approximately 1.5-1.75 mm vessel. · LCx: Ostial and proximal 90% stenosis of native LCx. High OM branch is 95-99% stenosis with faint forward flow. This vessel appears very small caliber. OM2 and OM3: Small caliber vessel with luminal irregularities. · RCA: Proximal and mid diffuse disease. Distally severe disease. There is evidence of left to right collateral.  -Echo 5/17/0584: Fidel Gamez LV systolic function with EF 55-60% with normal wall motion, normal LV size, normal wall thickness, normal diastolic function, no significant valvular disease.  -Tobacco abuse, approx. 20 pack-year smoking history  -Status post CABG x3; LIMA-LAD, SVG-diagonal and SVG-OM, POD 1  No primary cardiologist, initial referral completed by Dr. Goldman Gist:           Continue amiodarone at 0.5 mg/minute for 19 beat VT, no further sustained VT since that time        Twelve-lead ECG at 0343; sinus rhythm. Postoperative changes. Subjective:     Awake and alert.   No specific complaints    Objective:      Patient Vitals for the past 8 hrs:   Temp Pulse Resp BP SpO2   07/16/22 0815 97.9 °F (36.6 °C) 73 17 -- --   07/16/22 0800 97.7 °F (36.5 °C) 75 17 119/67 99 %   07/16/22 0745 97.3 °F (36.3 °C) 75 18 -- --   07/16/22 0730 97.2 °F (36.2 °C) 71 20 -- --   07/16/22 0715 -- 70 24 -- --   07/16/22 0700 97.2 °F (36.2 °C) 70 22 105/73 --   07/16/22 0600 97.3 °F (36.3 °C) 70 10 (!) 83/53 95 %   07/16/22 0500 98.4 °F (36.9 °C) 72 15 (!) 98/58 --   07/16/22 0400 98.6 °F (37 °C) 73 20 108/61 95 %   07/16/22 0300 98.2 °F (36.8 °C) 78 27 113/62 95 %         Patient Vitals for the past 96 hrs:   Weight   07/14/22 1401 51.8 kg (114 lb 1.4 oz)   07/14/22 0324 52.2 kg (115 lb 1.6 oz)   07/13/22 1702 54.4 kg (120 lb)   07/13/22 1330 61.7 kg (136 lb)   07/13/22 1117 61.7 kg (136 lb)                Current Facility-Administered Medications   Medication Dose Route Frequency Last Admin    albumin human 25% (BUMINATE) solution 12.5 g  12.5 g IntraVENous ONCE      sodium bicarbonate 8.4 % (1 mEq/mL) injection          0.9% sodium chloride infusion  10 mL/hr IntraVENous CONTINUOUS 10 mL/hr at 07/15/22 1533    sodium chloride (NS) flush 5-40 mL  5-40 mL IntraVENous Q8H 10 mL at 07/16/22 0600    sodium chloride (NS) flush 5-40 mL  5-40 mL IntraVENous PRN      acetaminophen (TYLENOL) tablet 650 mg  650 mg Oral Q4H PRN      HYDROcodone-acetaminophen (NORCO) 5-325 mg per tablet 1-2 Tablet  1-2 Tablet Oral Q6H PRN      morphine injection 2-4 mg  2-4 mg IntraVENous Q2H PRN 4 mg at 07/16/22 0000    naloxone (NARCAN) injection 0.4 mg  0.4 mg IntraVENous PRN      mupirocin (BACTROBAN) 2 % ointment   Both Nostrils BID Given at 07/16/22 0956    albuterol (PROVENTIL VENTOLIN) nebulizer solution 2.5 mg  2.5 mg Nebulization Q4H PRN      [Held by provider] metoprolol tartrate (LOPRESSOR) tablet 12.5 mg  12.5 mg Oral Q12H      ondansetron (ZOFRAN) injection 4 mg  4 mg IntraVENous Q4H PRN 4 mg at 07/16/22 0248    nitroGLYcerin (TRIDIL) 400 mcg/ml infusion  0-20 mcg/min IntraVENous TITRATE Stopped at 07/15/22 1630    aspirin delayed-release tablet 81 mg  81 mg Oral DAILY 81 mg at 07/16/22 0956    propofol (DIPRIVAN) 10 mg/mL infusion  0-50 mcg/kg/min IntraVENous TITRATE Stopped at 07/15/22 1612    chlorhexidine (PERIDEX) 0.12 % mouthwash 10 mL  10 mL Oral BID 10 mL at 07/16/22 0955    docusate sodium (COLACE) capsule 100 mg  100 mg Oral  mg at 07/16/22 0956    ELECTROLYTE REPLACEMENT PROTOCOL - Potassium Standard Dosing  1 Each Other PRN      ELECTROLYTE REPLACEMENT PROTOCOL - Magnesium  1 Each Other PRN      [START ON 7/17/2022] enoxaparin (LOVENOX) injection 40 mg  40 mg SubCUTAneous Q24H      bisacodyL (DULCOLAX) tablet 10 mg  10 mg Oral DAILY 10 mg at 07/16/22 0955    famotidine (PEPCID) tablet 20 mg  20 mg Oral BID 20 mg at 07/16/22 0956    fentaNYL citrate (PF) injection 12.5-25 mcg  12.5-25 mcg IntraVENous Q15MIN PRN 12.5 mcg at 07/15/22 1628    polyethylene glycol (MIRALAX) packet 17 g  17 g Oral DAILY 17 g at 07/16/22 0955    glucose chewable tablet 16 g  4 Tablet Oral PRN      glucagon (GLUCAGEN) injection 1 mg  1 mg IntraMUSCular PRN      dextrose 10% infusion 0-250 mL  0-250 mL IntraVENous PRN      insulin regular (MYXREDLIN, NOVOLIN, HUMULIN) 100 units/100 ml NS infusion (premix)  0-50 Units/hr IntraVENous TITRATE 2.5 Units/hr at 07/16/22 1021    ceFAZolin (ANCEF) 2 g in sterile water (preservative free) 20 mL IV syringe  2 g IntraVENous Q8H 2 g at 07/16/22 0647    amiodarone (NEXTERONE) 360 mg in dextrose 200 mL (1.8 mg/mL) infusion  1 mg/min IntraVENous TITRATE 0.5 mg/min at 07/16/22 0705    sodium chloride (NS) flush 5-40 mL  5-40 mL IntraVENous PRN      aminocaproic acid (AMICAR) 10 g in 0.9% sodium chloride 500 mL infusion  1 g/hr IntraVENous CONTINUOUS IV Completed at 07/15/22 2027    EPINEPHrine (ADRENALIN) 4 mg in 0.9% sodium chloride 250 mL infusion  1-10 mcg/min IntraVENous TITRATE 4 mcg/min at 07/16/22 0730    PHENYLephrine (CAMILA-SYNEPHRINE) 30 mg in 0.9% sodium chloride 250 mL infusion   mcg/min IntraVENous TITRATE Stopped at 07/15/22 1500    nicotine (NICODERM CQ) 14 mg/24 hr patch 1 Patch  1 Patch TransDERmal DAILY 1 Patch at 07/16/22 0956    atorvastatin (LIPITOR) tablet 40 mg  40 mg Oral QHS 40 mg at 07/15/22 2626    heparin (porcine) 25,000 units in 0.45% saline 250 ml infusion  12-25 Units/kg/hr IntraVENous CONTINUOUS 12 Units/kg/hr at 07/15/22 3130 Intake/Output Summary (Last 24 hours) at 7/16/2022 1046  Last data filed at 7/16/2022 1000  Gross per 24 hour   Intake 3511.28 ml   Output 2414 ml   Net 1097.28 ml       Physical Exam:  General:  alert, answers questions appropriately. Neck:  supple  Lungs:  clear to auscultation bilaterally  Heart:  regular rate and rhythm  Abdomen:  abdomen is soft without significant tenderness, masses, organomegaly or guarding  Extremities:  atraumatic, no edema    Visit Vitals  /67 (BP 1 Location: Left upper arm, BP Patient Position: Supine)   Pulse 73   Temp 97.9 °F (36.6 °C)   Resp 17   Ht 5' 2\" (1.575 m)   Wt 51.8 kg (114 lb 1.4 oz)   SpO2 99%   BMI 20.87 kg/m²       Data Review:     Labs: Results:       Chemistry Recent Labs     07/16/22  0435 07/15/22  2300 07/15/22  1420 07/13/22  1652   *  --  147* 94     --  144 137   K 4.3  --  3.6 4.0   *  --  112* 107   CO2 23  --  23 25   BUN 14  --  10 10   CREA 0.82  --  0.80 0.63   CA 7.9*  --  8.1* 8.9   MG 2.2 2.1 2.7*  --    AGAP 7  --  9 5   BUCR 17  --  13 16   AP  --   --   --  59   TP  --   --   --  7.6   ALB  --   --   --  3.5   GLOB  --   --   --  4.1*   AGRAT  --   --   --  0.9      CBC w/Diff Recent Labs     07/16/22  0435 07/15/22  1420 07/15/22  0505 07/14/22  0539 07/14/22  0539   WBC 13.5* 17.3* 6.7   < > 7.4   RBC 2.60* 3.04* 3.98*   < > 3.87*   HGB 8.2* 9.4* 12.2   < > 12.0   HCT 23.9* 28.0* 36.2   < > 35.4    174 209   < > 233   GRANS  --  80* 52  --  55   LYMPH  --  11* 36  --  34   EOS  --  0 2  --  2    < > = values in this interval not displayed. Coagulation Recent Labs     07/15/22  1420 07/15/22  0505 07/13/22  2248 07/13/22  1652   PTP 17.8*  --   --  14.8   INR 1.4*  --   --  1.1   APTT 35.6 61.3*   < >  --     < > = values in this interval not displayed.        Lipid Panel Lab Results   Component Value Date/Time    Cholesterol, total 127 07/14/2022 05:39 AM    HDL Cholesterol 43 07/14/2022 05:39 AM    LDL, calculated 73.8 07/14/2022 05:39 AM    VLDL, calculated 10.2 07/14/2022 05:39 AM    Triglyceride 51 07/14/2022 05:39 AM    CHOL/HDL Ratio 3.0 07/14/2022 05:39 AM      Liver Enzymes Recent Labs     07/13/22 1652   TP 7.6   ALB 3.5   AP 59      Thyroid Studies Lab Results   Component Value Date/Time    TSH 0.68 07/13/2022 04:52 PM          Signed By: Shoshana Fam MD     July 16, 2022

## 2022-07-16 NOTE — PROGRESS NOTES
New OT orders received and chart reviewed. Unable to see pt for OT evaluation at this time due to:    Nursing recommends hold OT due to pt is not medically stable to participate. Will follow up later as pt's schedule allows.  Thank you for this referral.    Jodi Woodward MS, OTR/L

## 2022-07-16 NOTE — PROGRESS NOTES
CARDIAC SURGERY PROGRESS NOTE    2022  10:16 AM     CC:  Post Operative Day # 1     Chart, images and labs reviewed. Discussed with available staff. Interval History/Events of Past 24 hours:   Extubated. UO responsive to volume. Epi increased from 2 to 8 this morning by overnight RN-now back to 4. Per EMR and tele review:looks like BP to 80s, HR to 70s with VVI 70 competition. Slight metabolic acidemia this AM and MV02 37%. CI 1.9, low SVI. PAP/CVP OK. Lactate slightly elevated. Creat adequate, creat normal.  Repeat HCT 21.2% while 5% albumin infusing currently. Amio infusion started after VT run ~11 pm.  Lytes replaced. One more run this AM.    Subjective:  Patient seen and examined on rounds today. Minimal procedural pain. Nausea/emesis earlier resolved. No abd pain. Pain level: controlled      Objective:  Vital signs:   Visit Vitals  /67 (BP 1 Location: Left upper arm, BP Patient Position: Supine)   Pulse 73   Temp 97.9 °F (36.6 °C)   Resp 17   Ht 5' 2\" (1.575 m)   Wt 51.8 kg (114 lb 1.4 oz)   SpO2 99%   BMI 20.87 kg/m²     Temp (24hrs), Av °F (36.1 °C), Min:95.4 °F (35.2 °C), Max:98.6 °F (37 °C)    Admission Weight: Last Weight   Weight: 61.7 kg (136 lb) Weight: 51.8 kg (114 lb 1.4 oz)     Telemetry: NSR 80    Physical Examination:     General:  Alert, oriented   Lungs: Clear to auscultation without rales, wheezes or rhonchi. Chest: Dressings clean and dry. Heart: regular rate and rhythm, No murmur. ++rub  Abdomen: not distended. soft and non-tender without masses. Bowel sounds not present. Extremities: Warm and well perfused. Edema mild. Incisions: bilat ACE wraps soft, clean and dry  Neuro: No deficit. Beta-blocker: on hold due to hemodynamics  ASA: Yes   Statin: Yes  ACE-I: No  Diuretic: No     DVT Prophylaxis:   pneumatic compression boots: Yes  Compression stockings:  Yes  Enoxaparin:  No    Chest tubes:  present.   Air Leak:  Yes    Pacing wires:  present    DME needs:   TBD          Labs:  Lab Results   Component Value Date/Time    WBC 13.5 (H) 07/16/2022 04:35 AM    HCT 23.9 (L) 07/16/2022 04:35 AM     07/16/2022 04:35 AM      Lab Results   Component Value Date/Time     07/16/2022 04:35 AM    K 4.3 07/16/2022 04:35 AM     (H) 07/16/2022 04:35 AM    CO2 23 07/16/2022 04:35 AM     (H) 07/16/2022 04:35 AM    BUN 14 07/16/2022 04:35 AM    CREA 0.82 07/16/2022 04:35 AM    CREA 0.80 07/15/2022 02:20 PM    CREA 0.63 07/13/2022 04:52 PM             EKG: NSR     CXR: no ptx or effusion. Large gastric dil>>free air       Assessment:  S/P  CABG x 3  Respiratory parameters stable  Poor quality coronaries    Plans:  1. Recheck cardiac profile and MV02 after albumin. May need blood transfusion if still needs volume. 2.  Slowly wean epi when MV02 improves. Continue amio IV, ASA and statin-will need plavix for severity of disease when epicardial wires out. Hold BB. 3.  KUB now. May need NGT vs reglan. 4.  Continue link for accurate outputs  5.  lantus and transition to SSI  6. Daughter updated bedside       Heart Hugger use is encouraged to mitigate pain and will also assist with deep breathing and incentive spirometry goals. Possible discharge 5 days. Kenneth Hernandez PA-C    PLEASE NOTE:  This document has been produced using voice recognition software. Unrecognized errors in transcription may be present. NOTE TO PATIENT:  The purpose of this note is to communicate optimally with the other providers involved in your care. It is written using standard medical terminology. If you have questions regarding details of the note please call my office at [phone] and make an appointment to discuss your concerns.

## 2022-07-16 NOTE — PROGRESS NOTES
Problem: Pressure Injury - Risk of  Goal: *Prevention of pressure injury  Description: Document Vargas Scale and appropriate interventions in the flowsheet. Outcome: Progressing Towards Goal  Note: Pressure Injury Interventions:  Sensory Interventions: Assess changes in LOC,Avoid rigorous massage over bony prominences,Turn and reposition approx. every two hours (pillows and wedges if needed)         Activity Interventions: Assess need for specialty bed,PT/OT evaluation    Mobility Interventions: Assess need for specialty bed,Pressure redistribution bed/mattress (bed type),Turn and reposition approx. every two hours(pillow and wedges)    Nutrition Interventions: Document food/fluid/supplement intake,Discuss nutritional consult with provider    Friction and Shear Interventions: Feet elevated on foot rest,Foam dressings/transparent film/skin sealants,Transferring/repositioning devices                Problem: Patient Education: Go to Patient Education Activity  Goal: Patient/Family Education  Outcome: Progressing Towards Goal     Problem: Falls - Risk of  Goal: *Absence of Falls  Description: Document Leafy Rapp Fall Risk and appropriate interventions in the flowsheet.   Outcome: Progressing Towards Goal  Note: Fall Risk Interventions:  Mobility Interventions: Assess mobility with egress test,Communicate number of staff needed for ambulation/transfer,OT consult for ADLs,PT Consult for mobility concerns,PT Consult for assist device competence         Medication Interventions: Assess postural VS orthostatic hypotension,Evaluate medications/consider consulting pharmacy,Patient to call before getting OOB,Teach patient to arise slowly    Elimination Interventions: Call light in reach,Patient to call for help with toileting needs,Toileting schedule/hourly rounds    History of Falls Interventions: Bed/chair exit alarm,Assess for delayed presentation/identification of injury for 48 hrs (comment for end date),Room close to nurse's station

## 2022-07-17 ENCOUNTER — APPOINTMENT (OUTPATIENT)
Dept: GENERAL RADIOLOGY | Age: 62
DRG: 234 | End: 2022-07-17
Attending: PHYSICIAN ASSISTANT
Payer: COMMERCIAL

## 2022-07-17 LAB
ALBUMIN SERPL-MCNC: 3.6 G/DL (ref 3.4–5)
ALBUMIN/GLOB SERPL: 1.9 {RATIO} (ref 0.8–1.7)
ALP SERPL-CCNC: 28 U/L (ref 45–117)
ALT SERPL-CCNC: 21 U/L (ref 13–56)
ANION GAP SERPL CALC-SCNC: 4 MMOL/L (ref 3–18)
AST SERPL-CCNC: 125 U/L (ref 10–38)
BASE DEFICIT BLD-SCNC: 1 MMOL/L
BASE DEFICIT BLDV-SCNC: 1.5 MMOL/L
BDY SITE: ABNORMAL
BILIRUB DIRECT SERPL-MCNC: 0.4 MG/DL (ref 0–0.2)
BILIRUB SERPL-MCNC: 0.9 MG/DL (ref 0.2–1)
BODY TEMPERATURE: 97.6
BUN SERPL-MCNC: 26 MG/DL (ref 7–18)
BUN/CREAT SERPL: 29 (ref 12–20)
CA-I BLD-MCNC: 1.09 MMOL/L (ref 1.12–1.32)
CALCIUM SERPL-MCNC: 8.3 MG/DL (ref 8.5–10.1)
CALCULATED R AXIS, ECG10: -66 DEGREES
CALCULATED T AXIS, ECG11: 52 DEGREES
CHLORIDE BLD-SCNC: 108 MMOL/L (ref 98–107)
CHLORIDE SERPL-SCNC: 110 MMOL/L (ref 100–111)
CO2 BLDV-SCNC: 23 MMOL/L (ref 22–30)
CO2 SERPL-SCNC: 27 MMOL/L (ref 21–32)
CREAT BLD-MCNC: 0.92 MG/DL (ref 0.6–1.3)
CREAT SERPL-MCNC: 0.9 MG/DL (ref 0.6–1.3)
DIAGNOSIS, 93000: NORMAL
GAS FLOW.O2 O2 DELIVERY SYS: ABNORMAL L/MIN
GLOBULIN SER CALC-MCNC: 1.9 G/DL (ref 2–4)
GLUCOSE BLD STRIP.AUTO-MCNC: 104 MG/DL (ref 70–110)
GLUCOSE BLD STRIP.AUTO-MCNC: 115 MG/DL (ref 70–110)
GLUCOSE BLD STRIP.AUTO-MCNC: 116 MG/DL (ref 70–110)
GLUCOSE BLD STRIP.AUTO-MCNC: 119 MG/DL (ref 70–110)
GLUCOSE BLD STRIP.AUTO-MCNC: 151 MG/DL (ref 74–106)
GLUCOSE BLD STRIP.AUTO-MCNC: 155 MG/DL (ref 70–110)
GLUCOSE SERPL-MCNC: 150 MG/DL (ref 74–99)
HCO3 BLD-SCNC: 22.2 MMOL/L (ref 22–26)
HCO3 BLDV-SCNC: 23.3 MMOL/L (ref 23–28)
HCT VFR BLD CALC: 24 % (ref 36–49)
HGB BLD-MCNC: 8.3 G/DL (ref 12–16)
LACTATE SERPL-SCNC: 2.1 MMOL/L (ref 0.4–2)
MAGNESIUM SERPL-MCNC: 1.9 MG/DL (ref 1.6–2.6)
O2/TOTAL GAS SETTING VFR VENT: 4 %
PCO2 BLD: 29.4 MMHG (ref 35–45)
PCO2 BLDV: 38.2 MMHG (ref 41–51)
PH BLD: 7.48 [PH] (ref 7.35–7.45)
PH BLDV: 7.39 [PH] (ref 7.32–7.42)
PO2 BLD: 64 MMHG (ref 80–100)
PO2 BLDV: 29 MMHG (ref 25–40)
POTASSIUM BLD-SCNC: 5 MMOL/L (ref 3.5–5.5)
POTASSIUM SERPL-SCNC: 5.1 MMOL/L (ref 3.5–5.5)
PROT SERPL-MCNC: 5.5 G/DL (ref 6.4–8.2)
Q-T INTERVAL, ECG07: 444 MS
QRS DURATION, ECG06: 110 MS
QTC CALCULATION (BEZET), ECG08: 444 MS
SAO2 % BLD: 94.3 % (ref 92–97)
SAO2 % BLDV: 56 % (ref 65–88)
SERVICE CMNT-IMP: ABNORMAL
SERVICE CMNT-IMP: ABNORMAL
SODIUM BLD-SCNC: 141 MMOL/L (ref 136–145)
SODIUM SERPL-SCNC: 141 MMOL/L (ref 136–145)
SPECIMEN TYPE: ABNORMAL
SPECIMEN TYPE: ABNORMAL
VENTRICULAR RATE, ECG03: 60 BPM

## 2022-07-17 PROCEDURE — 97535 SELF CARE MNGMENT TRAINING: CPT

## 2022-07-17 PROCEDURE — 2709999900 HC NON-CHARGEABLE SUPPLY

## 2022-07-17 PROCEDURE — 82803 BLOOD GASES ANY COMBINATION: CPT

## 2022-07-17 PROCEDURE — 77030040392 HC DRSG OPTIFOAM MDII -A

## 2022-07-17 PROCEDURE — 74011250636 HC RX REV CODE- 250/636: Performed by: THORACIC SURGERY (CARDIOTHORACIC VASCULAR SURGERY)

## 2022-07-17 PROCEDURE — 94762 N-INVAS EAR/PLS OXIMTRY CONT: CPT

## 2022-07-17 PROCEDURE — 74011000250 HC RX REV CODE- 250: Performed by: PHYSICIAN ASSISTANT

## 2022-07-17 PROCEDURE — 80048 BASIC METABOLIC PNL TOTAL CA: CPT

## 2022-07-17 PROCEDURE — 71045 X-RAY EXAM CHEST 1 VIEW: CPT

## 2022-07-17 PROCEDURE — 77010033678 HC OXYGEN DAILY

## 2022-07-17 PROCEDURE — 65620000000 HC RM CCU GENERAL

## 2022-07-17 PROCEDURE — 97116 GAIT TRAINING THERAPY: CPT

## 2022-07-17 PROCEDURE — 83735 ASSAY OF MAGNESIUM: CPT

## 2022-07-17 PROCEDURE — 99232 SBSQ HOSP IP/OBS MODERATE 35: CPT | Performed by: INTERNAL MEDICINE

## 2022-07-17 PROCEDURE — 36600 WITHDRAWAL OF ARTERIAL BLOOD: CPT

## 2022-07-17 PROCEDURE — 74011250637 HC RX REV CODE- 250/637: Performed by: PHYSICIAN ASSISTANT

## 2022-07-17 PROCEDURE — 74018 RADEX ABDOMEN 1 VIEW: CPT

## 2022-07-17 PROCEDURE — 74011250637 HC RX REV CODE- 250/637: Performed by: INTERNAL MEDICINE

## 2022-07-17 PROCEDURE — 82435 ASSAY OF BLOOD CHLORIDE: CPT

## 2022-07-17 PROCEDURE — 97166 OT EVAL MOD COMPLEX 45 MIN: CPT

## 2022-07-17 PROCEDURE — 80076 HEPATIC FUNCTION PANEL: CPT

## 2022-07-17 PROCEDURE — P9045 ALBUMIN (HUMAN), 5%, 250 ML: HCPCS | Performed by: THORACIC SURGERY (CARDIOTHORACIC VASCULAR SURGERY)

## 2022-07-17 PROCEDURE — 97162 PT EVAL MOD COMPLEX 30 MIN: CPT

## 2022-07-17 PROCEDURE — 83605 ASSAY OF LACTIC ACID: CPT

## 2022-07-17 PROCEDURE — 74011250636 HC RX REV CODE- 250/636: Performed by: PHYSICIAN ASSISTANT

## 2022-07-17 PROCEDURE — C1751 CATH, INF, PER/CENT/MIDLINE: HCPCS

## 2022-07-17 PROCEDURE — 74011636637 HC RX REV CODE- 636/637: Performed by: PHYSICIAN ASSISTANT

## 2022-07-17 RX ORDER — FUROSEMIDE 10 MG/ML
20 INJECTION INTRAMUSCULAR; INTRAVENOUS ONCE
Status: COMPLETED | OUTPATIENT
Start: 2022-07-17 | End: 2022-07-17

## 2022-07-17 RX ORDER — IPRATROPIUM BROMIDE AND ALBUTEROL SULFATE 2.5; .5 MG/3ML; MG/3ML
3 SOLUTION RESPIRATORY (INHALATION)
Status: DISCONTINUED | OUTPATIENT
Start: 2022-07-17 | End: 2022-07-25 | Stop reason: HOSPADM

## 2022-07-17 RX ORDER — FUROSEMIDE 40 MG/1
40 TABLET ORAL DAILY
Status: DISCONTINUED | OUTPATIENT
Start: 2022-07-17 | End: 2022-07-20

## 2022-07-17 RX ORDER — IPRATROPIUM BROMIDE AND ALBUTEROL SULFATE 2.5; .5 MG/3ML; MG/3ML
3 SOLUTION RESPIRATORY (INHALATION)
Status: DISCONTINUED | OUTPATIENT
Start: 2022-07-17 | End: 2022-07-17

## 2022-07-17 RX ADMIN — HYDROCODONE BITARTRATE AND ACETAMINOPHEN 1 TABLET: 5; 325 TABLET ORAL at 16:17

## 2022-07-17 RX ADMIN — SODIUM CHLORIDE, PRESERVATIVE FREE 10 ML: 5 INJECTION INTRAVENOUS at 22:17

## 2022-07-17 RX ADMIN — CHLORHEXIDINE GLUCONATE 0.12% ORAL RINSE 10 ML: 1.2 LIQUID ORAL at 18:17

## 2022-07-17 RX ADMIN — ASPIRIN 81 MG: 81 TABLET, COATED ORAL at 09:13

## 2022-07-17 RX ADMIN — SODIUM CHLORIDE, SODIUM LACTATE, POTASSIUM CHLORIDE, AND CALCIUM CHLORIDE 75 ML/HR: 600; 310; 30; 20 INJECTION, SOLUTION INTRAVENOUS at 23:00

## 2022-07-17 RX ADMIN — SODIUM CHLORIDE, PRESERVATIVE FREE 10 ML: 5 INJECTION INTRAVENOUS at 14:00

## 2022-07-17 RX ADMIN — SODIUM CHLORIDE, SODIUM LACTATE, POTASSIUM CHLORIDE, AND CALCIUM CHLORIDE 75 ML/HR: 600; 310; 30; 20 INJECTION, SOLUTION INTRAVENOUS at 20:39

## 2022-07-17 RX ADMIN — POLYETHYLENE GLYCOL 3350 17 G: 17 POWDER, FOR SOLUTION ORAL at 10:35

## 2022-07-17 RX ADMIN — MUPIROCIN: 20 OINTMENT TOPICAL at 09:16

## 2022-07-17 RX ADMIN — FUROSEMIDE 40 MG: 40 TABLET ORAL at 12:14

## 2022-07-17 RX ADMIN — CHLORHEXIDINE GLUCONATE 0.12% ORAL RINSE 10 ML: 1.2 LIQUID ORAL at 09:12

## 2022-07-17 RX ADMIN — SODIUM CHLORIDE, PRESERVATIVE FREE 10 ML: 5 INJECTION INTRAVENOUS at 00:20

## 2022-07-17 RX ADMIN — DOCUSATE SODIUM 100 MG: 100 CAPSULE, LIQUID FILLED ORAL at 18:17

## 2022-07-17 RX ADMIN — FUROSEMIDE 20 MG: 10 INJECTION, SOLUTION INTRAMUSCULAR; INTRAVENOUS at 02:32

## 2022-07-17 RX ADMIN — DOCUSATE SODIUM 100 MG: 100 CAPSULE, LIQUID FILLED ORAL at 10:35

## 2022-07-17 RX ADMIN — SODIUM CHLORIDE, PRESERVATIVE FREE 10 ML: 5 INJECTION INTRAVENOUS at 05:50

## 2022-07-17 RX ADMIN — Medication 3 UNITS: at 21:39

## 2022-07-17 RX ADMIN — FAMOTIDINE 20 MG: 20 TABLET ORAL at 18:17

## 2022-07-17 RX ADMIN — ENOXAPARIN SODIUM 40 MG: 100 INJECTION SUBCUTANEOUS at 18:17

## 2022-07-17 RX ADMIN — ATORVASTATIN CALCIUM 40 MG: 40 TABLET, FILM COATED ORAL at 22:16

## 2022-07-17 RX ADMIN — BISACODYL 10 MG: 5 TABLET, COATED ORAL at 10:35

## 2022-07-17 RX ADMIN — FAMOTIDINE 20 MG: 20 TABLET ORAL at 09:13

## 2022-07-17 RX ADMIN — ALBUMIN (HUMAN) 12.5 G: 12.5 INJECTION, SOLUTION INTRAVENOUS at 00:34

## 2022-07-17 RX ADMIN — FUROSEMIDE 20 MG: 10 INJECTION, SOLUTION INTRAMUSCULAR; INTRAVENOUS at 16:17

## 2022-07-17 RX ADMIN — Medication 10 UNITS: at 09:14

## 2022-07-17 NOTE — PROGRESS NOTES
CARDIAC SURGERY PROGRESS NOTE    2022  10:15 AM     CC:  Post Operative Day # 2     Chart, images and labs reviewed. Discussed with available staff. Interval History/Events of Past 24 hours:     Rhythm related decline in hemodynamics improved with epicardial V pacing. Mostly SR/SB, but have periods of JR. BB on hold, amio off x 15 hours. No more V ectopy. Adequate UO and normal creat. Lactate normalizing. Elevated AST. HCT 27% after one unit of blood yesterday. CXR with right effusion, no ptx.  + air leak right pleural and mediastinal and intermit left pleural.  2 l/m 02. Gastric dil resolved after BMs, KUB still with stool to right colon, no A/F levels or ileus. Subjective:  Patient seen and examined on rounds today. Procedural pain, minimal dyspnea    Objective:  Vital signs:   Visit Vitals  BP (!) 114/56   Pulse 71   Temp 97.6 °F (36.4 °C)   Resp 20   Ht 5' 2\" (1.575 m)   Wt 55.2 kg (121 lb 9.6 oz)   SpO2 99%   BMI 22.24 kg/m²     Temp (24hrs), Av.2 °F (37.3 °C), Min:97.6 °F (36.4 °C), Max:99.9 °F (37.7 °C)    Admission Weight: Last Weight   Weight: 61.7 kg (136 lb) Weight: 55.2 kg (121 lb 9.6 oz)     Telemetry: V paced 70- intrinsic is SB 58    Physical Examination:     General:  Alert, oriented   Lungs: decreased right base but otherwise, clear to auscultation without rales, wheezes or rhonchi. Chest: Dressings clean and dry. Tube connections secure. Heart: regular rate and rhythm, No murmur. +rub  Abdomen: Soft and non-tender without masses. Bowel sounds quiet. Extremities: Warm and well perfused. Edema mild. Incisions: clean, dry, no drainage. Neuro: No deficit. Beta-blocker: no due to herminio  ASA: Yes   Statin: Yes  ACE-I: No  Diuretic: No     DVT Prophylaxis:   pneumatic compression boots: Yes  Compression stockings:  Yes  Enoxaparin:  Yes    Chest tubes:  present. Air Leak:  Yes    Pacing wires:  present    DME needs:   TBD.  Will go to [facility]          Labs:  Lab Results   Component Value Date/Time    WBC 18.8 (H) 07/16/2022 05:05 PM    HCT 27.2 (L) 07/16/2022 05:05 PM     (L) 07/16/2022 05:05 PM      Lab Results   Component Value Date/Time     07/17/2022 04:44 AM    K 5.1 07/17/2022 04:44 AM     07/17/2022 04:44 AM    CO2 27 07/17/2022 04:44 AM     (H) 07/17/2022 04:44 AM    BUN 26 (H) 07/17/2022 04:44 AM    CREA 0.90 07/17/2022 04:44 AM    CREA 0.99 07/16/2022 05:05 PM    CREA 0.73 07/16/2022 01:00 PM         Assessment:  S/P  CABG x 3  Respiratory parameters stable  Bradycardia  transaminitis  Active smoker  Chest tube with air leak    Plans:  1. Continue to hold amiodarone and beta-blocker. Continue aspirin, statin. Will need clopidogrel when epicardial pacing wires removed. Do not think permanent pacemaker will be needed. 2.  Continue chest tubes to suction. Consider removing left pleural tube tomorrow. X-ray tomorrow morning. 3.  Start daily Lasix. Follow BMP and CBC daily. 4.  Pulmonary toilet and encourage incentive spirometry. Heart Hugger use is encouraged to mitigate pain and will also assist with deep breathing and incentive spirometry goals. Possible discharge 4 days. Dio Ellis PA-C    PLEASE NOTE:  This document has been produced using voice recognition software. Unrecognized errors in transcription may be present. NOTE TO PATIENT:  The purpose of this note is to communicate optimally with the other providers involved in your care. It is written using standard medical terminology. If you have questions regarding details of the note please call my office at [phone] and make an appointment to discuss your concerns.

## 2022-07-17 NOTE — PROGRESS NOTES
0700- Change of shift. Bedside shift report received from [facility], [address] Will assume care of the patient. 1244- Patient up to recliner. 0830- Physical therapy/occupational therapy at bedside. Patient ambulated inside room. 1025- While the patient is awake and active rhythm is sinus with rates in the 70's. When sleeping HR however falls to 55-59 SB with associated decline in blood pressure with MAP <65. Due to this pacer increased to 70 BPM which results in MAP >65.    1045- Writer at bedside when patient oxygen saturation began to fall from mid 90's to mid 80's. Oxygen increased to 6 LNC however saturations continued to fall requiring non-rebreather mask. Matt Hyde, [id number] notified, RT called. BS clear/diminished. Patient reported only mild SOB. On provider arrival to bedside saturations began to improved and returned to 100%. Pacing rate also increased to 80 bpm by provider. ABG and x-ray ordered and completed. Patient transitioned back to 4 LNC. Will continue to monitor. 1115- Patient ambulated to the restroom. 1340- Patient ambulated with RN about 30 feet. Chest tubes remained on suction, pacer on and connected, 4 LNC and on continuous VS monitoring. Per patient request she returned to her bed and is resting quietly. 26- Dr Emilie Travis at bedside. Per MD allow the patient to stay in her intrinsic rhythm and set pacer at a backup rate of 50 bpm. Currently SB/SR with rates 58-62. Also verbal order received to give 20mg Lasix IV x 1 if patient does not respond to the PO lasix dose. 1600- Patient resting comfortably in bed. No complaints at this time. Call bell with patient. 1900- Change of shift. Report received from [facility], [address]

## 2022-07-17 NOTE — PROGRESS NOTES
Problem: Mobility Impaired (Adult and Pediatric)  Goal: *Acute Goals and Plan of Care (Insert Text)  Description: Physical Therapy Goals  Initiated 7/17/2022 and to be accomplished within 7 day(s)  1. Patient will move from supine to sit and sit to supine , scoot up and down, and roll side to side in bed with supervision/set-up. 2.  Patient will transfer from bed to chair and chair to bed with supervision/set-up using the least restrictive device. 3.  Patient will perform sit to stand with supervision/set-up. 4.  Patient will ambulate with supervision/set-up for 300 feet with the least restrictive device. 5.  Patient will ascend/descend 12 stairs with unilateral handrail(s) with supervision/set-up. PLOF: Pt reporting she lives in 2 story house, independent PTA, will be going to live with her daughter at discharge. Reporting her daughter lives in 2nd floor apartment. Outcome: Progressing Towards Goal   PHYSICAL THERAPY EVALUATION    Patient: Corbin Dc (30 y.o. female)  Date: 7/17/2022  Primary Diagnosis: NSTEMI (non-ST elevated myocardial infarction) ([facility] [address] [I21.4]  Procedure(s) (LRB):  CORONARY ARTERY BYPASS GRAFT (CABG) TIMES THREE (N/A) 2 Days Post-Op   Precautions:  Fall,Sternal    ASSESSMENT :  Pt cleared to participate in PT session, pt received semi-reclined in recliner and agreeable to therapy session. Completing with OT to maximize safety and mobility. Based on the objective data described below, the patient presents with decreased endurance, decreased strength, decreased balance reactions, gait deviations, and decreased independence in functional mobility. Pt educated on sternal precautions and use of heart hugger, verbalizing understanding. Pt standing with CGA to RW, requesting to toilet. Ambulating with slow gait speed x10 feet to toilet with CGA. Pt toileting with supervision. Standing with CGA, ambulating back to recliner. RN requesting standing weight.  Sitting in recliner for rest break, standing for 3rd time with CGA and able to step up onto and off of scale with Neville. Returned to [facility]. Pt positioned for comfort and educated to call for assist before getting up, pt verbalized understanding. Pt left with all needs met and call bell in reach. RN notified of position and participation. Patient will benefit from skilled intervention to address the above impairments. Patient's rehabilitation potential is considered to be Good  Factors which may influence rehabilitation potential include:   []         None noted  []         Mental ability/status  [x]         Medical condition  []         Home/family situation and support systems  []         Safety awareness  []         Pain tolerance/management  []         Other:      PLAN :  Recommendations and Planned Interventions:   [x]           Bed Mobility Training             []    Neuromuscular Re-Education  [x]           Transfer Training                   []    Orthotic/Prosthetic Training  [x]           Gait Training                          []    Modalities  [x]           Therapeutic Exercises           []    Edema Management/Control  [x]           Therapeutic Activities            [x]    Family Training/Education  [x]           Patient Education  []           Other (comment):    Frequency/Duration: Patient will be followed by physical therapy 1-2 times per day/4-7 days per week to address goals. Discharge Recommendations: Home Health with family support   Further Equipment Recommendations for Discharge: rolling walker    [facility]: Josse Storm scored a 18/24  on the AM-PAC short mobility form. Current research shows that an AM-PAC score of 18  or greater is typically associated with a discharge to the patient's home setting. Based on the patient's AM-PAC score and their current functional mobility deficits, it is recommended that the patient have 2-3 sessions per week of Physical Therapy at d/c to increase the patient's independence. At this time, this patient demonstrates the endurance and safety to discharge home with [address] (home vs OP services) and a follow up treatment frequency of 2-3x/wk. Please see assessment section for further patient specific details. This AMPAC score should be considered in conjunction with interdisciplinary team recommendations to determine the most appropriate discharge setting. Patient's social support, diagnosis, medical stability, and prior level of function should also be taken into consideration. SUBJECTIVE:   Patient stated That was a lot for the first day.     OBJECTIVE DATA SUMMARY:     Past Medical History:   Diagnosis Date    Arthritis      Past Surgical History:   Procedure Laterality Date    HX PARTIAL HYSTERECTOMY       Barriers to Learning/Limitations: None  Compensate with: N/A  Home Situation:  Home Situation  Home Environment: Apartment  # Steps to Enter: 12 (2nd floor )  Rails to Enter: Yes  Wheelchair Ramp: No  One/Two Story Residence: Two story  Living Alone: No  Support Systems: Child(randall)  Patient Expects to be Discharged to[de-identified] Home with family assistance  Current DME Used/Available at Home: None  Critical Behavior:  Neurologic State: Alert  Orientation Level: Oriented X4  Cognition: Appropriate decision making  Safety/Judgement: Awareness of environment; Fall prevention  Psychosocial  Patient Behaviors: Calm; Cooperative  Needs Expressed: Educational;Emotional  Purposeful Interaction: Yes  Pt Identified Daily Priority: Clinical issues (comment)  [employer] Process: Attend basic human needs  Caring Interventions: Therapeutic modalities  Reassure: Caring rounds; Informing  Therapeutic Modalities: Deep breathing  Other Caring Modalities: hourly rounding    Strength:    Strength: Generally decreased, functional    Tone & Sensation:   Tone: Normal    Sensation: Intact    Range Of Motion:  AROM: Within functional limits    Posture:  Posture (WDL): Within defined limits     Functional Mobility:  Bed Mobility:     Supine to Sit:  (found up in chair )     Scooting: Minimum assistance  Transfers:  Sit to Stand: Contact guard assistance  Stand to Sit: Contact guard assistance    Balance:   Sitting: Impaired; With support  Sitting - Static: Good (unsupported)  Sitting - Dynamic: Fair (occasional) (+)  Standing: Impaired; With support  Standing - Static: Good  Standing - Dynamic : Fair    Ambulation/Gait Training:  Distance (ft): 10 Feet (ft) (x2)  Assistive Device: Walker, rolling  Ambulation - Level of Assistance: Contact guard assistance     Gait Description (WDL): Exceptions to WDL  Gait Abnormalities: Decreased step clearance    Base of Support: Center of gravity altered;Narrowed     Speed/Na: Slow;Shuffled  Step Length: Right shortened;Left shortened    Pain:  Pain level pre-treatment: 0/10   Pain level post-treatment: 0/10     Activity Tolerance:   Good     Please refer to the flowsheet for vital signs taken during this treatment. After treatment:   [x]         Patient left in no apparent distress sitting up in chair  []         Patient left in no apparent distress in bed  [x]         Call bell left within reach  [x]         Nursing notified  []         Caregiver present  []         Bed alarm activated  []         SCDs applied    COMMUNICATION/EDUCATION:   [x]         Role of Physical Therapy in the acute care setting. [x]         Fall prevention education was provided and the patient/caregiver indicated understanding. [x]         Patient/family have participated as able in goal setting and plan of care. [x]         Patient/family agree to work toward stated goals and plan of care. []         Patient understands intent and goals of therapy, but is neutral about his/her participation. []         Patient is unable to participate in goal setting/plan of care: ongoing with therapy staff.  []         Other:     Thank you for this referral.  Darrell Malave, PT   Time Calculation: 28 mins      Eval Complexity: History: MEDIUM  Complexity : 1-2 comorbidities / personal factors will impact the outcome/ POC Exam:MEDIUM Complexity : 3 Standardized tests and measures addressing body structure, function, activity limitation and / or participation in recreation  Presentation: MEDIUM Complexity : Evolving with changing characteristics  Clinical Decision Making:Medium Complexity    Overall Complexity:MEDIUM    [employer] AM-PAC® Basic Mobility Inpatient Short Form (6-Clicks) Version 2    How much HELP from another person does the patient currently need    (If the patient hasn't done an activity recently, how much help from another person do you think he/she would need if he/she tried?)   Total (Total A or Dep)   A Lot  (Mod to Max A)   A Little (Sup or Min A)   None (Mod I to I)   Turning from your back to your side while in a flat bed without using bedrails? [] 1 [] 2 [x] 3 [] 4   2. Moving from lying on your back to sitting on the side of a flat bed without using bedrails? [] 1 [] 2 [x] 3 [] 4   3. Moving to and from a bed to a chair (including a wheelchair)? [] 1 [] 2 [x] 3 [] 4   4. Standing up from a chair using your arms (e.g., wheelchair, or bedside chair)? [] 1 [] 2 [x] 3 [] 4   5. Walking in hospital room? [] 1 [] 2 [x] 3 [] 4   6. Climbing 3-5 steps with a railing?+   [] 1 [] 2 [x] 3 [] 4   +If stair climbing cannot be assessed, skip item #6. Sum responses from items 1-5.

## 2022-07-17 NOTE — PROGRESS NOTES
Cardiology Progress Note    Admit Date: 7/13/2022  Attending Cardiologist: Dr. Belcher Free:     -NSTEMI. Given  mg and SL NTG x 1 at Urgent Care. Cardiac cath 7/13/2022 with findings as follows:  · LM: Angiographically normal.  · LAD: Mid LAD has diffuse up to 70-75% long disease. Distal vessel appeared approximately 2 mm in size without any significant obstructive disease. Diagonal branch with 99% ostial disease, bifurcating vessel, approximately 1.5-1.75 mm vessel. · LCx: Ostial and proximal 90% stenosis of native LCx. High OM branch is 95-99% stenosis with faint forward flow. This vessel appears very small caliber. OM2 and OM3: Small caliber vessel with luminal irregularities. · RCA: Proximal and mid diffuse disease. Distally severe disease. There is evidence of left to right collateral.  -Echo 8/72/9368: Zita Chrissy LV systolic function with EF 55-60% with normal wall motion, normal LV size, normal wall thickness, normal diastolic function, no significant valvular disease.  -Tobacco abuse, approx. 20 pack-year smoking history  -Status post CABG x3; LIMA-LAD, SVG-diagonal and SVG-OM, POD 1  No primary cardiologist, initial referral completed by Dr. Shah Samples:           Amiodarone discontinued overnight secondary to bradycardia and hypotension. At patient the patient is in sinus rhythm with an occasional paced complex sitting in a chair. .      Subjective:     Awake and alert.   No  complaints    Objective:      Patient Vitals for the past 8 hrs:   Temp Pulse Resp BP SpO2   07/17/22 1000 -- 71 20 (!) 114/56 99 %   07/17/22 0930 -- 72 28 (!) 121/56 98 %   07/17/22 0900 -- 72 28 (!) 129/50 98 %   07/17/22 0830 -- 80 23 (!) 124/57 --   07/17/22 0800 97.6 °F (36.4 °C) 70 28 102/89 94 %   07/17/22 0745 -- 70 30 (!) 98/49 93 %   07/17/22 0715 -- 72 29 (!) 89/57 --   07/17/22 0600 99.5 °F (37.5 °C) 80 19 112/64 98 %   07/17/22 0507 99 °F (37.2 °C) 80 24 (!) 103/58 98 %   07/17/22 3327 99.5 °F (37.5 °C) 80 22 118/64 98 %   07/17/22 0300 99.1 °F (37.3 °C) 80 26 115/64 98 %   07/17/22 0232 -- 80 -- 132/63 --         Patient Vitals for the past 96 hrs:   Weight   07/17/22 0800 55.2 kg (121 lb 9.6 oz)   07/14/22 1401 51.8 kg (114 lb 1.4 oz)   07/14/22 0324 52.2 kg (115 lb 1.6 oz)   07/13/22 1702 54.4 kg (120 lb)   07/13/22 1330 61.7 kg (136 lb)   07/13/22 1117 61.7 kg (136 lb)                Current Facility-Administered Medications   Medication Dose Route Frequency Last Admin    insulin glargine (LANTUS) injection 10 Units  10 Units SubCUTAneous DAILY 10 Units at 07/17/22 0914    insulin lispro (HUMALOG) injection   SubCUTAneous AC&HS      glucose chewable tablet 16 g  4 Tablet Oral PRN      glucagon (GLUCAGEN) injection 1 mg  1 mg IntraMUSCular PRN      dextrose 10% infusion 0-250 mL  0-250 mL IntraVENous PRN      EPINEPHrine (ADRENALIN) 4 mg in 0.9% sodium chloride 250 mL infusion  0-4 mcg/min IntraVENous TITRATE Stopped at 07/17/22 0551    bisacodyL (DULCOLAX) suppository 10 mg  10 mg Rectal DAILY 10 mg at 07/16/22 1642    lidocaine (XYLOCAINE) 2 % viscous solution 15 mL  15 mL Mucous Membrane PRN      lactated Ringers infusion  75 mL/hr IntraVENous CONTINUOUS 75 mL/hr at 07/16/22 1650    morphine injection 2-4 mg  2-4 mg IntraVENous Q2H PRN 2 mg at 07/16/22 1917    nitroGLYcerin (TRIDIL) 400 mcg/ml infusion  10 mcg/min IntraVENous CONTINUOUS Held at 07/16/22 2000    0.9% sodium chloride infusion  10 mL/hr IntraVENous CONTINUOUS 10 mL/hr at 07/15/22 1533    sodium chloride (NS) flush 5-40 mL  5-40 mL IntraVENous Q8H 10 mL at 07/17/22 0550    sodium chloride (NS) flush 5-40 mL  5-40 mL IntraVENous PRN      acetaminophen (TYLENOL) tablet 650 mg  650 mg Oral Q4H PRN      HYDROcodone-acetaminophen (NORCO) 5-325 mg per tablet 1-2 Tablet  1-2 Tablet Oral Q6H PRN      naloxone (NARCAN) injection 0.4 mg  0.4 mg IntraVENous PRN      albuterol (PROVENTIL VENTOLIN) nebulizer solution 2.5 mg 2.5 mg Nebulization Q4H PRN      [Held by provider] metoprolol tartrate (LOPRESSOR) tablet 12.5 mg  12.5 mg Oral Q12H      ondansetron (ZOFRAN) injection 4 mg  4 mg IntraVENous Q4H PRN 4 mg at 07/16/22 0248    aspirin delayed-release tablet 81 mg  81 mg Oral DAILY 81 mg at 07/17/22 0913    chlorhexidine (PERIDEX) 0.12 % mouthwash 10 mL  10 mL Oral BID 10 mL at 07/17/22 0912    docusate sodium (COLACE) capsule 100 mg  100 mg Oral  mg at 07/16/22 0956    ELECTROLYTE REPLACEMENT PROTOCOL - Potassium Standard Dosing  1 Each Other PRN      ELECTROLYTE REPLACEMENT PROTOCOL - Magnesium  1 Each Other PRN      enoxaparin (LOVENOX) injection 40 mg  40 mg SubCUTAneous Q24H      bisacodyL (DULCOLAX) tablet 10 mg  10 mg Oral DAILY 10 mg at 07/16/22 0955    famotidine (PEPCID) tablet 20 mg  20 mg Oral BID 20 mg at 07/17/22 0913    polyethylene glycol (MIRALAX) packet 17 g  17 g Oral DAILY 17 g at 07/16/22 0955    sodium chloride (NS) flush 5-40 mL  5-40 mL IntraVENous PRN      PHENYLephrine (CAMILA-SYNEPHRINE) 30 mg in 0.9% sodium chloride 250 mL infusion   mcg/min IntraVENous TITRATE Stopped at 07/15/22 1500    nicotine (NICODERM CQ) 14 mg/24 hr patch 1 Patch  1 Patch TransDERmal DAILY 1 Patch at 07/17/22 0914    atorvastatin (LIPITOR) tablet 40 mg  40 mg Oral QHS 40 mg at 07/16/22 1212         Intake/Output Summary (Last 24 hours) at 7/17/2022 1029  Last data filed at 7/17/2022 1000  Gross per 24 hour   Intake 1731.69 ml   Output 1698 ml   Net 33.69 ml       Physical Exam:  General:  alert, answers questions appropriately.   Neck:  supple  Lungs:  clear to auscultation bilaterally  Heart:  regular rate and rhythm  Abdomen:  abdomen is soft without significant tenderness, masses, organomegaly or guarding  Extremities:  atraumatic, no edema    Visit Vitals  BP (!) 114/56   Pulse 71   Temp 97.6 °F (36.4 °C)   Resp 20   Ht 5' 2\" (1.575 m)   Wt 55.2 kg (121 lb 9.6 oz)   SpO2 99%   BMI 22.24 kg/m²       Data Review:     Labs: Results:       Chemistry Recent Labs     07/17/22  0444 07/16/22  1705 07/16/22  1300 07/16/22  0435 07/16/22  0435 07/15/22  2300 07/15/22  1420   * 150* 105*   < > 141*  --    < >    144 144   < > 144  --    < >   K 5.1 4.4 4.0   < > 4.3  --    < >    113* 112*   < > 114*  --    < >   CO2 27 22 25   < > 23  --    < >   BUN 26* 19* 17   < > 14  --    < >   CREA 0.90 0.99 0.73   < > 0.82  --    < >   CA 8.3* 7.8* 7.6*   < > 7.9*  --    < >   MG 1.9  --   --   --  2.2 2.1  --    AGAP 4 9 7   < > 7  --    < >   BUCR 29* 19 23*   < > 17  --    < >   AP 28*  --   --   --   --   --   --    TP 5.5*  --   --   --   --   --   --    ALB 3.6  --   --   --   --   --   --    GLOB 1.9*  --   --   --   --   --   --    AGRAT 1.9*  --   --   --   --   --   --     < > = values in this interval not displayed. CBC w/Diff Recent Labs     07/16/22  1705 07/16/22  0953 07/16/22  0435 07/15/22  1420 07/15/22  1420 07/15/22  0505 07/15/22  0505   WBC 18.8*  --  13.5*  --  17.3*   < > 6.7   RBC 2.95*  --  2.60*  --  3.04*   < > 3.98*   HGB 9.1* 7.2* 8.2*   < > 9.4*   < > 12.2   HCT 27.2* 21.2* 23.9*   < > 28.0*   < > 36.2   *  --  144  --  174   < > 209   GRANS 78*  --   --   --  80*  --  52   LYMPH 10*  --   --   --  11*  --  36   EOS 0  --   --   --  0  --  2    < > = values in this interval not displayed.       Coagulation Recent Labs     07/15/22  1420 07/15/22  0505   PTP 17.8*  --    INR 1.4*  --    APTT 35.6 61.3*       Lipid Panel Lab Results   Component Value Date/Time    Cholesterol, total 127 07/14/2022 05:39 AM    HDL Cholesterol 43 07/14/2022 05:39 AM    LDL, calculated 73.8 07/14/2022 05:39 AM    VLDL, calculated 10.2 07/14/2022 05:39 AM    Triglyceride 51 07/14/2022 05:39 AM    CHOL/HDL Ratio 3.0 07/14/2022 05:39 AM      Liver Enzymes Recent Labs     07/17/22  0444   TP 5.5*   ALB 3.6   AP 28*      Thyroid Studies Lab Results   Component Value Date/Time    TSH 0.68 07/13/2022 04:52 PM          Signed By: Betty Ford MD     July 17, 2022

## 2022-07-17 NOTE — PROGRESS NOTES
Problem: Self Care Deficits Care Plan (Adult)  Goal: *Acute Goals and Plan of Care (Insert Text)  Description: Occupational Therapy Goals  Initiated 7/17/2022 within 7 day(s). 1.  Patient will perform grooming with supervision/set-up standing at the sink for >3 min with Good balance. 2.  Patient will perform upper body dressing with supervision/set-up. 3.  Patient will perform lower body dressing with supervision/set-up. 4.  Patient will perform toilet transfers with supervision/set-up. 5.  Patient will perform all aspects of toileting with supervision/set-up. 6.  Patient will recall 100% of sternal precautions and will follow them during ADLs w/o cues. 7.  Patient will utilize energy conservation techniques during functional activities w/o cues. Prior Level of Function: Pt reports being Mod Ind for ADLs and functional mobility. Pt will be staying with her daughter upon DC. Outcome: Progressing Towards Goal  OCCUPATIONAL THERAPY EVALUATION    Patient: Ganesh Wade (89 y.o. female)  Date: 7/17/2022  Primary Diagnosis: NSTEMI (non-ST elevated myocardial infarction) ([facility] [address] [I21.4]  Procedure(s) (LRB):  CORONARY ARTERY BYPASS GRAFT (CABG) TIMES THREE (N/A) 2 Days Post-Op   Precautions:   Fall,Sternal    ASSESSMENT :  Pt cleared to participate in OT evaluation by RN. Upon entering room, pt received in recliner, alert, and agreeable to OT eval/treatment. Pt seen in conjunction with PT to maximize safety of patient and staff members. Based on the objective data described below, the patient presents with decreased endurance and functional activity tolerance, generalized weakness, sternal precautions, decreased functional mobility and standing balance, limiting pt's participation and independence with ADLs. Pt was educated on sternal precautions and how they relate to ADLs and functional mobility. Pt verbalized understanding, required Min VCs to recall and to follow throughout the session.  Pt maneuvered to the toilet using FWW with CGA for balance and assist for management of mult lines. Pt educated on adaptive strategy for toileting hygiene, which she was able to perform with Supervision. Pt returned to recliner at the end of the session, positioned for comfort. Pt reports fatigue following activity. RN notified of the session. Patient will benefit from skilled intervention to address the above impairments. Patient's rehabilitation potential is considered to be Good  Factors which may influence rehabilitation potential include:   []             None noted  []             Mental ability/status  [x]             Medical condition  [x]             Home/family situation and support systems  []             Safety awareness  []             Pain tolerance/management  []             Other:      PLAN :  Recommendations and Planned Interventions:   [x]               Self Care Training                  [x]      Therapeutic Activities  [x]               Functional Mobility Training   []      Cognitive Retraining  [x]               Therapeutic Exercises           [x]      Endurance Activities  [x]               Balance Training                    [x]      Neuromuscular Re-Education  []               Visual/Perceptual Training     [x]      Home Safety Training  [x]               Patient Education                   [x]      Family Training/Education  []               Other (comment):    Frequency/Duration: Patient will be followed by occupational therapy 1-2 times per day 3-5 days per week to address goals. Discharge Recommendations: Home Health with family support  Further Equipment Recommendations for Discharge: shower chair and rolling walker    [facility]: Shaun Ace scored a 19/24 on the AM-PAC ADL Inpatient form. Current research shows that an AM-PAC score of 18 or greater is associated with a discharge to the patient's home setting.  Based on the patient's AM-PAC score and their current ADL deficits, it is recommended that the patient have 2-3 sessions per week of Occupational Therapy at d/c to increase the patient's independence. Please see assessment section for further patient specific details. SUBJECTIVE:   Patient stated That was a lot for my first day.     OBJECTIVE DATA SUMMARY:     Past Medical History:   Diagnosis Date    Arthritis      Past Surgical History:   Procedure Laterality Date    HX PARTIAL HYSTERECTOMY       Barriers to Learning/Limitations: None  Compensate with: visual, verbal, tactile, kinesthetic cues/model    Home Situation:   Home Situation  Home Environment: Apartment  # Steps to Enter: 12 (2nd floor )  Rails to Enter: Yes  Wheelchair Ramp: No  One/Two Story Residence: Two story  Living Alone: No  Support Systems: Child(randall)  Patient Expects to be Discharged to[de-identified] Home with family assistance  Current DME Used/Available at Home: None  Tub or Shower Type: Tub/Shower combination  []  Right hand dominant   []  Left hand dominant    Cognitive/Behavioral Status:  Neurologic State: Alert  Orientation Level: Oriented X4  Cognition: Follows commands  Safety/Judgement: Awareness of environment; Fall prevention    Skin: visible skin intact  Edema: none noted    Vision/Perceptual:       WFL  Coordination: BUE  Coordination: Generally decreased, functional  Fine Motor Skills-Upper: Left Intact; Right Intact    Gross Motor Skills-Upper: Left Intact; Right Intact  Balance:  Sitting: Impaired; With support  Sitting - Static: Good (unsupported)  Sitting - Dynamic: Fair (occasional) (+)  Standing: Impaired; With support  Standing - Static: Good  Standing - Dynamic : Fair  Strength: BUE  Strength: Generally decreased, functional (tested within sternal precautions)   Tone & Sensation: BUE  Tone: Normal  Sensation: Impaired   Range of Motion: BUE  AROM: Generally decreased, functional (tested within sternal precautions)      Functional Mobility and Transfers for ADLs:  Bed Mobility:     Supine to Sit:  (found up in chair )     Scooting: Minimum assistance  Transfers:  Sit to Stand: Contact guard assistance  Stand to Sit: Contact guard assistance    Toilet Transfer : Contact guard assistance     Bathroom Mobility: Contact guard assistance  ADL Assessment:   Feeding: Setup  Oral Facial Hygiene/Grooming: Supervision  Bathing: Minimum assistance  Upper Body Dressing: Minimum assistance  Lower Body Dressing: Minimum assistance  Toileting: Stand by assistance   ADL Intervention:   Cognitive Retraining  Safety/Judgement: Awareness of environment; Fall prevention    Pain:  Pain level pre-treatment: not rated   Pain level post-treatment: not rated    Activity Tolerance:   Fair  Please refer to the flowsheet for vital signs taken during this treatment. After treatment:   [] Patient left in no apparent distress sitting up in chair  [x] Patient left in no apparent distress in bed  [x] Call bell left within reach  [x] Nursing notified  [] Caregiver present  [] Bed alarm activated    COMMUNICATION/EDUCATION:   [x] Role of Occupational Therapy in the acute care setting  [x] Home safety education was provided and the patient/caregiver indicated understanding. [x] Patient/family have participated as able in goal setting and plan of care. [] Patient/family agree to work toward stated goals and plan of care. [] Patient understands intent and goals of therapy, but is neutral about his/her participation. [] Patient is unable to participate in goal setting and plan of care. Thank you for this referral.  Altagracia Blas OTR/L  Time Calculation: 28 mins    Eval Complexity: History: MEDIUM Complexity : Expanded review of history including physical, cognitive and psychosocial  history ; Examination: MEDIUM Complexity : 3-5 performance deficits relating to physical, cognitive , or psychosocial skils that result in activity limitations and / or participation restrictions;    Decision Making:MEDIUM Complexity : Patient may present with comorbidities that affect occupational performnce. Miniml to moderate modification of tasks or assistance (eg, physical or verbal ) with assesment(s) is necessary to enable patient to complete evaluation     [address] [place] [address] [address] AM-PAC® Daily Activity Inpatient Short Form (6-Clicks)*    How much HELP from another person does the patient currently need    (If the patient hasn't done an activity recently, how much help from another person do you think he/she would need if he/she tried?)   Total (Total A or Dep)   A Lot  (Mod to Max A)   A Little (Sup or Min A)   None (Mod I to I)   Putting on and taking off regular lower body clothing? [] 1 [] 2 [x] 3 [] 4   2. Bathing (including washing, rinsing,      drying)? [] 1 [] 2 [x] 3 [] 4   3. Toileting, which includes using toilet, bedpan or urinal?   [] 1 [] 2 [x] 3 [] 4   4. Putting on and taking off regular upper body clothing? [] 1 [] 2 [x] 3 [] 4   5. Taking care of personal grooming such as brushing teeth? [] 1 [] 2 [x] 3 [] 4   6. Eating meals? [] 1 [] 2 [] 3 [x] 4   19/24    Current research shows that an AM-PAC score of 18 or greater is associated with a discharge to the patient's home setting. Based on the patient's AM-PAC score and their current ADL deficits, it is recommended that the patient have 2-3 sessions per week of Occupational Therapy at d/c to increase the patient's independence. Please see assessment section for further patient specific details.

## 2022-07-17 NOTE — PROGRESS NOTES
1900-Bedside and Verbal shift change report given to This Writer-RN by Renea VALLADARES. Report included the following information SBAR, Intake/Output, MAR, Recent Results, Cardiac Rhythm   and Quality Measures. Pt lying in bed in no apparent distress. Care assumed. 2000- N. O to start NTG drip at 10 mcq/kg/min. NTG held due to parameters. Hold for SBP<110. A-Line poor dampened wave form and poorly correlated to manual cuff BP. Continue to monitor. 2050-ZAYRA Leary M.D called for status update and made aware of currently being ventricular paced at rate of 90 with 1:1 capture and rhythm is junctional on monitor. N.O obtained to decrease vetricular. pacing rate to 70. Continue to monitor for changes and perform C.O, CI, and Hemocalculation. 2300-BELL Topete called for update and made aware of status and New order per Dr. Stew Elizalde to decrease ventricular pacer rate to 70.    0202-Dr. Emma Avendano made aware urine output last 2 hrs post albumin administration and repeat C. O of 2.41,  C.I 1.61, and  Mean BP 67. PAP diastolic and systolic pressures. Updated patient is currently Ventricular paced at rate of 70. N.O's obtained to increase ventricular paced rate to 80 and Lasix 20mg IV lasix. Above as noted completed by this writer. 0320-Portable CXR completed at bedside. Passing flatus and continent of stool. 0610-Shari Urrutia called and was given status update of PAP's, C.O, CI, BP mean, urine output post lasix and albumin, evacuating of BM and flatus, and mixed venous gases. Also, informed of intrinsic SR on cardiac monitor with VVI pacer rate decreased to 60. HR was 71-72 on monitor and strip obtained. N.O's to  d/c A-line, [place]-Bernabe catheter, and NGT and this was completed without incident. 0730-SAMANTHA CONRAD was given status update as above noted. VVI rate set at 60 an remains on and connected. Change of Shift report given to STEFANI Bunch-JUMANA.  SBAR, Intake/Output, MAR, Recent Results, Cardiac Rhythm and Quality Measures

## 2022-07-17 NOTE — PROGRESS NOTES
Wound Prevention Checklist    Patient: Fredy Reddy (38 y.o. female)  Date: 7/17/2022  Diagnosis: NSTEMI (non-ST elevated myocardial infarction) ([facility] [address] [I21.4] S/P CABG x 3    Precautions: Fall,Sternal       []  Heel prevention boots placed on patient    [x]  Patient turned q2h during shift    []  Lift team ordered    [x]  Patient on Garrett bed/Specialty bed    [x]  Each Wound is documented during shift (Stage, Color, drainage, odor, measurements, and dressings)    [x]  Dual skin checks done at bedside during shift report with Mary Correa [address] RN

## 2022-07-18 ENCOUNTER — APPOINTMENT (OUTPATIENT)
Dept: GENERAL RADIOLOGY | Age: 62
DRG: 234 | End: 2022-07-18
Attending: PHYSICIAN ASSISTANT
Payer: COMMERCIAL

## 2022-07-18 LAB
ABO + RH BLD: NORMAL
ANION GAP SERPL CALC-SCNC: 4 MMOL/L (ref 3–18)
BASOPHILS # BLD: 0 K/UL (ref 0–0.1)
BASOPHILS NFR BLD: 0 % (ref 0–2)
BLD PROD TYP BPU: NORMAL
BLD PROD TYP BPU: NORMAL
BLOOD GROUP ANTIBODIES SERPL: NORMAL
BPU ID: NORMAL
BPU ID: NORMAL
BUN SERPL-MCNC: 39 MG/DL (ref 7–18)
BUN/CREAT SERPL: 45 (ref 12–20)
CALCIUM SERPL-MCNC: 8.1 MG/DL (ref 8.5–10.1)
CHLORIDE SERPL-SCNC: 105 MMOL/L (ref 100–111)
CO2 SERPL-SCNC: 29 MMOL/L (ref 21–32)
CREAT SERPL-MCNC: 0.86 MG/DL (ref 0.6–1.3)
CROSSMATCH RESULT,%XM: NORMAL
CROSSMATCH RESULT,%XM: NORMAL
DIFFERENTIAL METHOD BLD: ABNORMAL
EOSINOPHIL # BLD: 0 K/UL (ref 0–0.4)
EOSINOPHIL NFR BLD: 0 % (ref 0–5)
ERYTHROCYTE [DISTWIDTH] IN BLOOD BY AUTOMATED COUNT: 14.1 % (ref 11.6–14.5)
GLUCOSE BLD STRIP.AUTO-MCNC: 106 MG/DL (ref 70–110)
GLUCOSE BLD STRIP.AUTO-MCNC: 106 MG/DL (ref 70–110)
GLUCOSE BLD STRIP.AUTO-MCNC: 114 MG/DL (ref 70–110)
GLUCOSE BLD STRIP.AUTO-MCNC: 136 MG/DL (ref 70–110)
GLUCOSE SERPL-MCNC: 104 MG/DL (ref 74–99)
HCT VFR BLD AUTO: 25.7 % (ref 35–45)
HGB BLD-MCNC: 8.5 G/DL (ref 12–16)
IMM GRANULOCYTES # BLD AUTO: 0 K/UL (ref 0–0.04)
IMM GRANULOCYTES NFR BLD AUTO: 0 % (ref 0–0.5)
LACTATE SERPL-SCNC: 1.9 MMOL/L (ref 0.4–2)
LYMPHOCYTES # BLD: 1.8 K/UL (ref 0.9–3.6)
LYMPHOCYTES NFR BLD: 17 % (ref 21–52)
MAGNESIUM SERPL-MCNC: 2.5 MG/DL (ref 1.6–2.6)
MCH RBC QN AUTO: 30.9 PG (ref 24–34)
MCHC RBC AUTO-ENTMCNC: 33.1 G/DL (ref 31–37)
MCV RBC AUTO: 93.5 FL (ref 78–100)
MONOCYTES # BLD: 0.9 K/UL (ref 0.05–1.2)
MONOCYTES NFR BLD: 9 % (ref 3–10)
NEUTS SEG # BLD: 7.6 K/UL (ref 1.8–8)
NEUTS SEG NFR BLD: 74 % (ref 40–73)
NRBC # BLD: 0.02 K/UL (ref 0–0.01)
NRBC BLD-RTO: 0.2 PER 100 WBC
PLATELET # BLD AUTO: 96 K/UL (ref 135–420)
PMV BLD AUTO: 12.6 FL (ref 9.2–11.8)
POTASSIUM SERPL-SCNC: 4.1 MMOL/L (ref 3.5–5.5)
RBC # BLD AUTO: 2.75 M/UL (ref 4.2–5.3)
SODIUM SERPL-SCNC: 138 MMOL/L (ref 136–145)
SPECIMEN EXP DATE BLD: NORMAL
STATUS OF UNIT,%ST: NORMAL
STATUS OF UNIT,%ST: NORMAL
UNIT DIVISION, %UDIV: 0
UNIT DIVISION, %UDIV: 0
WBC # BLD AUTO: 10.4 K/UL (ref 4.6–13.2)

## 2022-07-18 PROCEDURE — 94762 N-INVAS EAR/PLS OXIMTRY CONT: CPT

## 2022-07-18 PROCEDURE — 36415 COLL VENOUS BLD VENIPUNCTURE: CPT

## 2022-07-18 PROCEDURE — 74011250637 HC RX REV CODE- 250/637: Performed by: PHYSICIAN ASSISTANT

## 2022-07-18 PROCEDURE — 97535 SELF CARE MNGMENT TRAINING: CPT

## 2022-07-18 PROCEDURE — 74011000250 HC RX REV CODE- 250: Performed by: PHYSICIAN ASSISTANT

## 2022-07-18 PROCEDURE — 97530 THERAPEUTIC ACTIVITIES: CPT

## 2022-07-18 PROCEDURE — 74011636637 HC RX REV CODE- 636/637: Performed by: PHYSICIAN ASSISTANT

## 2022-07-18 PROCEDURE — 71045 X-RAY EXAM CHEST 1 VIEW: CPT

## 2022-07-18 PROCEDURE — 65620000000 HC RM CCU GENERAL

## 2022-07-18 PROCEDURE — 77010033678 HC OXYGEN DAILY

## 2022-07-18 PROCEDURE — 80048 BASIC METABOLIC PNL TOTAL CA: CPT

## 2022-07-18 PROCEDURE — 74011250637 HC RX REV CODE- 250/637: Performed by: INTERNAL MEDICINE

## 2022-07-18 PROCEDURE — 83605 ASSAY OF LACTIC ACID: CPT

## 2022-07-18 PROCEDURE — 97116 GAIT TRAINING THERAPY: CPT

## 2022-07-18 PROCEDURE — 99232 SBSQ HOSP IP/OBS MODERATE 35: CPT | Performed by: INTERNAL MEDICINE

## 2022-07-18 PROCEDURE — 83735 ASSAY OF MAGNESIUM: CPT

## 2022-07-18 PROCEDURE — 74011250636 HC RX REV CODE- 250/636: Performed by: PHYSICIAN ASSISTANT

## 2022-07-18 PROCEDURE — 82962 GLUCOSE BLOOD TEST: CPT

## 2022-07-18 PROCEDURE — 99024 POSTOP FOLLOW-UP VISIT: CPT | Performed by: PHYSICIAN ASSISTANT

## 2022-07-18 PROCEDURE — APPNB180 APP NON BILLABLE TIME > 60 MINS: Performed by: PHYSICIAN ASSISTANT

## 2022-07-18 PROCEDURE — 85025 COMPLETE CBC W/AUTO DIFF WBC: CPT

## 2022-07-18 RX ORDER — GUAIFENESIN 600 MG/1
600 TABLET, EXTENDED RELEASE ORAL EVERY 12 HOURS
Status: DISCONTINUED | OUTPATIENT
Start: 2022-07-18 | End: 2022-07-25 | Stop reason: HOSPADM

## 2022-07-18 RX ADMIN — FAMOTIDINE 20 MG: 20 TABLET ORAL at 08:34

## 2022-07-18 RX ADMIN — HYDROCODONE BITARTRATE AND ACETAMINOPHEN 1 TABLET: 5; 325 TABLET ORAL at 14:43

## 2022-07-18 RX ADMIN — ATORVASTATIN CALCIUM 40 MG: 40 TABLET, FILM COATED ORAL at 22:38

## 2022-07-18 RX ADMIN — CHLORHEXIDINE GLUCONATE 0.12% ORAL RINSE 10 ML: 1.2 LIQUID ORAL at 08:34

## 2022-07-18 RX ADMIN — HYDROCODONE BITARTRATE AND ACETAMINOPHEN 1 TABLET: 5; 325 TABLET ORAL at 06:24

## 2022-07-18 RX ADMIN — GUAIFENESIN 600 MG: 600 TABLET ORAL at 14:43

## 2022-07-18 RX ADMIN — Medication 10 UNITS: at 08:34

## 2022-07-18 RX ADMIN — CHLORHEXIDINE GLUCONATE 0.12% ORAL RINSE 10 ML: 1.2 LIQUID ORAL at 18:58

## 2022-07-18 RX ADMIN — ENOXAPARIN SODIUM 40 MG: 100 INJECTION SUBCUTANEOUS at 18:48

## 2022-07-18 RX ADMIN — HYDROCODONE BITARTRATE AND ACETAMINOPHEN 1 TABLET: 5; 325 TABLET ORAL at 20:38

## 2022-07-18 RX ADMIN — SODIUM CHLORIDE, PRESERVATIVE FREE 10 ML: 5 INJECTION INTRAVENOUS at 06:54

## 2022-07-18 RX ADMIN — GUAIFENESIN 600 MG: 600 TABLET ORAL at 20:38

## 2022-07-18 RX ADMIN — SODIUM CHLORIDE, PRESERVATIVE FREE 10 ML: 5 INJECTION INTRAVENOUS at 22:00

## 2022-07-18 RX ADMIN — FUROSEMIDE 40 MG: 40 TABLET ORAL at 08:33

## 2022-07-18 RX ADMIN — ASPIRIN 81 MG: 81 TABLET, COATED ORAL at 08:34

## 2022-07-18 RX ADMIN — FAMOTIDINE 20 MG: 20 TABLET ORAL at 18:48

## 2022-07-18 NOTE — ANESTHESIA POSTPROCEDURE EVALUATION
Procedure(s):  CORONARY ARTERY BYPASS GRAFT (CABG) TIMES THREE.    general    Anesthesia Post Evaluation      Multimodal analgesia: multimodal analgesia used between 6 hours prior to anesthesia start to PACU discharge  Patient location during evaluation: ICU  Patient participation: complete - patient participated  Level of consciousness: awake and alert  Pain management: adequate  Airway patency: patent  Anesthetic complications: no  Cardiovascular status: acceptable and hemodynamically stable  Respiratory status: acceptable  Hydration status: acceptable  Post anesthesia nausea and vomiting:  controlled      INITIAL Post-op Vital signs:   Vitals Value Taken Time   BP 96/55 07/18/22 0700   Temp 36.9 °C (98.5 °F) 07/18/22 0400   Pulse 71 07/18/22 0722   Resp 22 07/18/22 0722   SpO2 97 % 07/18/22 0712   Vitals shown include unvalidated device data.

## 2022-07-18 NOTE — PROGRESS NOTES
Problem: Self Care Deficits Care Plan (Adult)  Goal: *Acute Goals and Plan of Care (Insert Text)  Description: Occupational Therapy Goals  Initiated 7/17/2022 within 7 day(s). 1.  Patient will perform grooming with supervision/set-up standing at the sink for >3 min with Good balance. 2.  Patient will perform upper body dressing with supervision/set-up. 3.  Patient will perform lower body dressing with supervision/set-up. 4.  Patient will perform toilet transfers with supervision/set-up. 5.  Patient will perform all aspects of toileting with supervision/set-up. 6.  Patient will recall 100% of sternal precautions and will follow them during ADLs w/o cues. 7.  Patient will utilize energy conservation techniques during functional activities w/o cues. Prior Level of Function: Pt reports being Mod Ind for ADLs and functional mobility. Pt will be staying with her daughter upon DC. Outcome: Progressing Towards Goal   OCCUPATIONAL THERAPY TREATMENT    Patient: Kitty Randhawa (77 y.o. female)  Date: 7/18/2022  Diagnosis: NSTEMI (non-ST elevated myocardial infarction) ([facility] [address] [I21.4] S/P CABG x 3  Procedure(s) (LRB):  CORONARY ARTERY BYPASS GRAFT (CABG) TIMES THREE (N/A) 3 Days Post-Op  Precautions: Fall,Sternal  PLOF: Pt reports being Mod Ind for ADLs and functional mobility. Pt will be staying with her daughter upon DC. Chart, occupational therapy assessment, plan of care, and goals were reviewed. ASSESSMENT:  PT co tx to maximize pt safety and participation. Pt presented sitting upright in reclining entry, on 2L O2NC, and agreeable for participation. Reviewed sternal precautions as pt was unable to recall and required min cueing throughout session to utilize heart hugger. STS transfer CGA with Rollator and performed functional mobility ~ 50 ft w/ CGA, simulating BR mobility. She performed toilet transfer w/ CGA. Pt required with casi area hygiene 2/2 decreased dyn standing tolerance and balance.  Pt returned back to reclining chair at end of session. Pt educated on importance of performing PLB technique to prevent SOB and for optimal oxygenation. Vitals monitored throughout tx session and WNL. RN made aware of pt's participation and position. Progression toward goals:  []          Improving appropriately and progressing toward goals  [x]          Improving slowly and progressing toward goals  []          Not making progress toward goals and plan of care will be adjusted     PLAN:  Patient continues to benefit from skilled intervention to address the above impairments. Continue treatment per established plan of care. Discharge Recommendations: [facility] with increased family assist  Further Equipment Recommendations for Discharge:  [facility]: Cheo Ruvalcaba scored a 19/24 on the AM-PAC ADL Inpatient form. Current research shows that an AM-PAC score of 18 or greater is associated with a discharge to the patient's home setting. Based on the patient's AM-PAC score and their current ADL deficits, it is recommended that the patient have 2-3 sessions per week of Occupational Therapy at d/c to increase the patient's independence. Please see assessment section for further patient specific details. SUBJECTIVE:   Patient stated  I will have lots of help when I leave here. \"     OBJECTIVE DATA SUMMARY:   Cognitive/Behavioral Status:  Neurologic State: Alert,Appropriate for age  Orientation Level: Oriented X4  Cognition: Appropriate decision making,Appropriate for age attention/concentration,Appropriate safety awareness  Safety/Judgement: Awareness of environment,Fall prevention    Functional Mobility and Transfers for ADLs:      Transfers:  Sit to Stand: Contact guard assistance  Stand to Sit: Contact guard assistance      Bathroom Mobility: Contact guard assistance (w/ Rollator)        Balance:  Sitting: Intact  Standing: Impaired; With support  Standing - Static: Good  Standing - Dynamic : Fair    ADL Intervention: Toileting  Bladder Hygiene: Contact guard assistance  Clothing Management: Contact guard assistance  Cues: Verbal cues provided    Pain:  Pain level pre-treatment: 2/10   Pain level post-treatment: 2/10  Pain Intervention(s): Medication (see MAR); Rest,  Repositioning   Response to intervention: Nurse notified, See doc flow    Activity Tolerance:    Fair  Please refer to the flowsheet for vital signs taken during this treatment. After treatment:   [x]  Patient left in no apparent distress sitting up in chair  []  Patient left in no apparent distress in bed  [x]  Call bell left within reach  [x]  Nursing notified  []  Caregiver present  []  Bed alarm activated    COMMUNICATION/EDUCATION:   [x] Role of Occupational Therapy in the acute care setting  [x] Home safety education was provided and the patient/caregiver indicated understanding. [x] Patient/family have participated as able in working towards goals and plan of care. [x] Patient/family agree to work toward stated goals and plan of care. [] Patient understands intent and goals of therapy, but is neutral about his/her participation. [] Patient is unable to participate in goal setting and plan of care. Thank you for this referral.  GEETA Bob  Time Calculation: 26 mins    Trent Ko AM-PAC® Daily Activity Inpatient Short Form (6-Clicks)    How much HELP from another person does the patient currently need    (If the patient hasn't done an activity recently, how much help from another person do you think he/she would need if he/she tried?)   Total (Total A or Dep)   A Lot  (Mod to Max A)   A Little (Sup or Min A)   None (Mod I to I)   Putting on and taking off regular lower body clothing? [] 1 [] 2 [x] 3 [] 4   2. Bathing (including washing, rinsing,      drying)? [] 1 [] 2 [x] 3 [] 4   3. Toileting, which includes using toilet, bedpan or urinal?   [] 1 [] 2 [x] 3 [] 4   4.  Putting on and taking off regular upper body clothing? [] 1 [] 2 [x] 3 [] 4   5. Taking care of personal grooming such as brushing teeth? [] 1 [] 2 [x] 3 [] 4   6. Eating meals?    [] 1 [] 2 [] 3 [x] 4

## 2022-07-18 NOTE — PROGRESS NOTES
Problem: Mobility Impaired (Adult and Pediatric)  Goal: *Acute Goals and Plan of Care (Insert Text)  Description: Physical Therapy Goals  Initiated 7/17/2022 and to be accomplished within 7 day(s)  1. Patient will move from supine to sit and sit to supine , scoot up and down, and roll side to side in bed with supervision/set-up. 2.  Patient will transfer from bed to chair and chair to bed with supervision/set-up using the least restrictive device. 3.  Patient will perform sit to stand with supervision/set-up. 4.  Patient will ambulate with supervision/set-up for 300 feet with the least restrictive device. 5.  Patient will ascend/descend 12 stairs with unilateral handrail(s) with supervision/set-up. PLOF: Pt reporting she lives in [address] [facility], independent [facility], will be going to live with her daughter at discharge. Reporting her daughter lives in 2nd floor apartment. Outcome: Progressing Towards Goal     PHYSICAL THERAPY TREATMENT    Patient: Corbin Dc (14 y.o. female)  Date: 7/18/2022  Diagnosis: NSTEMI (non-ST elevated myocardial infarction) ([facility] [address] [I21.4] S/P CABG x 3  Procedure(s) (LRB):  CORONARY ARTERY BYPASS GRAFT (CABG) TIMES THREE (N/A) 3 Days Post-Op  Precautions: Fall,Sternal  PLOF: see above     ASSESSMENT:  Pt seen up in chair in NAD, remains on 2L via NC however above 90% with functional mobility this date. Pt mobilizing at [facility] level however continues to need verbal cues for sternal precautions on heart hugger. Pt tolerates up to 50 ft of gait this date at slow cautious speed, no LOB noted. At end of session, pt positioned for comfort up in recliner with all needs met, vitals stable and WFL. Will continue to follow, pt remains appropriate for discharge home with [facility] at this time.    Progression toward goals:   [x]      Improving appropriately and progressing toward goals  []      Improving slowly and progressing toward goals  []      Not making progress toward goals and plan of care will be adjusted     PLAN:  Patient continues to benefit from skilled intervention to address the above impairments. Continue treatment per established plan of care. Discharge Recommendations:  Home Health with increased family support  Further Equipment Recommendations for Discharge:  rolling walker    [facility]: Carmel Ladd scored a 18/24 on the AM-PAC short mobility form. Current research shows that an AM-PAC score of 18 (14 if omitting stairs) or greater is typically associated with a discharge to the patient's home setting. Based on the patient's AM-PAC score and their current functional mobility deficits, it is recommended that the patient have 2-3 sessions per week of Physical Therapy at d/c to increase the patient's independence. At this time, this patient demonstrates the endurance and safety to discharge home with home health (home vs OP services) and a follow up treatment frequency of 2-3x/wk. Please see assessment section for further patient specific details. SUBJECTIVE:   Patient stated I am feeling better, I walked yesterday.     OBJECTIVE DATA SUMMARY:   Critical Behavior:  Neurologic State: Alert,Appropriate for age  Orientation Level: Oriented X4  Cognition: Appropriate decision making,Appropriate for age attention/concentration,Appropriate safety awareness  Safety/Judgement: Awareness of environment,Fall prevention  Functional Mobility Training:  Bed Mobility:  NT up in recliner          Transfers:  Sit to Stand: Contact guard assistance  Stand to Sit: Contact guard assistance       Balance:  Sitting: Intact  Standing: Impaired; With support  Standing - Static: Good  Standing - Dynamic : Fair           Ambulation/Gait Training:  Distance (ft): 50 Feet (ft)  Assistive Device: Walker, rolling  Ambulation - Level of Assistance: Contact guard assistance        Gait Abnormalities: Decreased step clearance        Base of Support: Center of gravity altered     Speed/Na: Pace decreased (<100 feet/min)  Step Length: Right shortened;Left shortened      Pain:  Pain level pre-treatment: 2/10  Pain level post-treatment: 2/10   Pain Intervention(s): Medication (see MAR); Rest, Ice, Repositioning   Response to intervention: Nurse notified    Activity Tolerance:   Good    Please refer to the flowsheet for vital signs taken during this treatment. After treatment:   [x] Patient left in no apparent distress sitting up in chair  [] Patient left in no apparent distress in bed  [x] Call bell left within reach  [x] Nursing notified  [] Caregiver present  [] Bed alarm activated  [] SCDs applied      COMMUNICATION/EDUCATION:   [x]         Role of Physical Therapy in the acute care setting. [x]         Fall prevention education was provided and the patient/caregiver indicated understanding. [x]         Patient/family have participated as able in working toward goals and plan of care. [x]         Patient/family agree to work toward stated goals and plan of care. []         Patient understands intent and goals of therapy, but is neutral about his/her participation. []         Patient is unable to participate in stated goals/plan of care: ongoing with therapy staff.  []         Other:        Mariaa Xoing   Time Calculation: 26 mins    [address] [facility] [address] [address] AM-PAC® Basic Mobility Inpatient Short Form (6-Clicks) Version 2    How much HELP from another person does the patient currently need    (If the patient hasn't done an activity recently, how much help from another person do you think he/she would need if he/she tried?)   Total (Total A or Dep)   A Lot  (Mod to Max A)   A Little (Sup or Min A)   None (Mod I to I)   Turning from your back to your side while in a flat bed without using bedrails? [] 1 [] 2 [x] 3 [] 4   2. Moving from lying on your back to sitting on the side of a flat bed without using bedrails? [] 1 [] 2 [x] 3 [] 4   3. Moving to and from a bed to a chair (including a wheelchair)?    [] 1 [] 2 [x] 3 [] 4   4. Standing up from a chair using your arms (e.g., wheelchair, or bedside chair)? [] 1 [] 2 [x] 3 [] 4   5. Walking in hospital room? [] 1 [] 2 [x] 3 [] 4   6. Climbing 3-5 steps with a railing?+   [] 1 [] 2 [x] 3 [] 4   +If stair climbing cannot be assessed, skip item #6. Sum responses from items 1-5. Current research shows that an AM-PAC score of 18 (14 if omitting stairs) or greater is typically associated with a discharge to the patient's home setting. Based on the patient's AM-PAC score and their current functional mobility deficits, it is recommended that the patient have 2-3 sessions per week of Physical Therapy at d/c to increase the patient's independence. At this time, this patient demonstrates the endurance and safety to discharge home with home health (home vs OP services) and a follow up treatment frequency of 2-3x/wk. Please see assessment section for further patient specific details.

## 2022-07-18 NOTE — PROGRESS NOTES
Cardiology Progress Note    Admit Date: 7/13/2022  Attending Cardiologist: Dr. Shahana Gomez:     -CAD, s/p CABG x 3 (LIMA-LAD, SVG-diagonal, SVG-OM) by Dr. Urbano Ramsey 7/15/2022  -NSTEMI. Jacquie Alfred  mg and SL NTG x 1 at Urgent Care. Cardiac cath 7/13/2022 with findings as follows:  · LM: Angiographically normal.  · LAD: Mid LAD has diffuse up to 70-75% long disease. Distal vessel appeared approximately 2 mm in size without any significant obstructive disease. Diagonal branch with 99% ostial disease, bifurcating vessel, approximately 1.5-1.75 mm vessel. · LCx: Ostial and proximal 90% stenosis of native LCx. High OM branch is 95-99% stenosis with faint forward flow. This vessel appears very small caliber. OM2 and OM3: Small caliber vessel with luminal irregularities. · RCA: Proximal and mid diffuse disease. Distally severe disease. There is evidence of left to right collateral.  -Echo 3/41/3961: Fidel Gamez LV systolic function with EF 55-60% with normal wall motion, normal LV size, normal wall thickness, normal diastolic function, no significant valvular disease.  -Tobacco abuse, approx. 20 pack-year smoking history    No primary cardiologist, initial referral completed by Dr. Goldman Gist:     Addendum: independently seen and evaluated. Agree with below. making slow but steady progress. Continue routine supportive care. Increase activity as tolerated. -Continue routine post-operative care per cardiothoracic surgery team, appreciate assistance.  -Continue ASA, Lipitor. Metoprolol held due to post-op bradycardia.  -Increase mobility as tolerated. -Encourage incentive spirometry use. Subjective:     No new complaints.      Objective:      Patient Vitals for the past 8 hrs:   Temp Pulse Resp BP SpO2   07/18/22 1200 98 °F (36.7 °C) 76 24 93/61 93 %   07/18/22 1100 -- 76 25 93/61 92 %   07/18/22 1000 -- 73 22 -- 98 %   07/18/22 0900 -- 72 18 (!) 99/53 98 %   07/18/22 0800 98.2 °F (36.8 °C) 72 23 (!) 102/56 --   07/18/22 0700 -- 70 28 (!) 96/55 92 %   07/18/22 0600 -- 72 -- (!) 99/56 --         Patient Vitals for the past 96 hrs:   Weight   07/17/22 0800 55.2 kg (121 lb 9.6 oz)   07/14/22 1401 51.8 kg (114 lb 1.4 oz)                Current Facility-Administered Medications   Medication Dose Route Frequency Last Admin    guaiFENesin ER (MUCINEX) tablet 600 mg  600 mg Oral Q12H      furosemide (LASIX) tablet 40 mg  40 mg Oral DAILY 40 mg at 07/18/22 0833    albuterol-ipratropium (DUO-NEB) 2.5 MG-0.5 MG/3 ML  3 mL Nebulization Q6H PRN      insulin glargine (LANTUS) injection 10 Units  10 Units SubCUTAneous DAILY 10 Units at 07/18/22 0834    insulin lispro (HUMALOG) injection   SubCUTAneous AC&HS 3 Units at 07/17/22 2139    glucose chewable tablet 16 g  4 Tablet Oral PRN      glucagon (GLUCAGEN) injection 1 mg  1 mg IntraMUSCular PRN      dextrose 10% infusion 0-250 mL  0-250 mL IntraVENous PRN      lidocaine (XYLOCAINE) 2 % viscous solution 15 mL  15 mL Mucous Membrane PRN      morphine injection 2-4 mg  2-4 mg IntraVENous Q2H PRN 2 mg at 07/16/22 1917    sodium chloride (NS) flush 5-40 mL  5-40 mL IntraVENous Q8H 10 mL at 07/18/22 0654    sodium chloride (NS) flush 5-40 mL  5-40 mL IntraVENous PRN      acetaminophen (TYLENOL) tablet 650 mg  650 mg Oral Q4H PRN      HYDROcodone-acetaminophen (NORCO) 5-325 mg per tablet 1-2 Tablet  1-2 Tablet Oral Q6H PRN 1 Tablet at 07/18/22 0624    naloxone (NARCAN) injection 0.4 mg  0.4 mg IntraVENous PRN      albuterol (PROVENTIL VENTOLIN) nebulizer solution 2.5 mg  2.5 mg Nebulization Q4H PRN      [Held by provider] metoprolol tartrate (LOPRESSOR) tablet 12.5 mg  12.5 mg Oral Q12H      ondansetron (ZOFRAN) injection 4 mg  4 mg IntraVENous Q4H PRN 4 mg at 07/16/22 0248    aspirin delayed-release tablet 81 mg  81 mg Oral DAILY 81 mg at 07/18/22 0834    chlorhexidine (PERIDEX) 0.12 % mouthwash 10 mL  10 mL Oral BID 10 mL at 07/18/22 0834    docusate sodium (COLACE) capsule 100 mg  100 mg Oral  mg at 07/17/22 1817    ELECTROLYTE REPLACEMENT PROTOCOL - Potassium Standard Dosing  1 Each Other PRN      ELECTROLYTE REPLACEMENT PROTOCOL - Magnesium  1 Each Other PRN      enoxaparin (LOVENOX) injection 40 mg  40 mg SubCUTAneous Q24H 40 mg at 07/17/22 1817    famotidine (PEPCID) tablet 20 mg  20 mg Oral BID 20 mg at 07/18/22 0834    sodium chloride (NS) flush 5-40 mL  5-40 mL IntraVENous PRN      nicotine (NICODERM CQ) 14 mg/24 hr patch 1 Patch  1 Patch TransDERmal DAILY 1 Patch at 07/18/22 0832    atorvastatin (LIPITOR) tablet 40 mg  40 mg Oral QHS 40 mg at 07/17/22 2216         Intake/Output Summary (Last 24 hours) at 7/18/2022 1305  Last data filed at 7/18/2022 1259  Gross per 24 hour   Intake 2295 ml   Output 1589 ml   Net 706 ml       Physical Exam:  General:  alert, cooperative, no distress, appears stated age  Neck:  supple  Lungs:  clear to auscultation bilaterally  Heart:  regular rate and rhythm  Abdomen:  abdomen is soft without significant tenderness, masses, organomegaly or guarding  Extremities:  Compression stockings to lower extremities, no appreciable pitting edema    Visit Vitals  BP 93/61   Pulse 76   Temp 98 °F (36.7 °C)   Resp 24   Ht 5' 2\" (1.575 m)   Wt 55.2 kg (121 lb 9.6 oz)   SpO2 93%   BMI 22.24 kg/m²       Data Review:     Labs: Results:       Chemistry Recent Labs     07/18/22  0514 07/17/22  0444 07/16/22  1705 07/16/22  1300 07/16/22  0435   * 150* 150*   < > 141*    141 144   < > 144   K 4.1 5.1 4.4   < > 4.3    110 113*   < > 114*   CO2 29 27 22   < > 23   BUN 39* 26* 19*   < > 14   CREA 0.86 0.90 0.99   < > 0.82   CA 8.1* 8.3* 7.8*   < > 7.9*   MG 2.5 1.9  --   --  2.2   AGAP 4 4 9   < > 7   BUCR 45* 29* 19   < > 17   AP  --  28*  --   --   --    TP  --  5.5*  --   --   --    ALB  --  3.6  --   --   --    GLOB  --  1.9*  --   --   --    AGRAT  --  1.9*  --   --   --     < > = values in this interval not displayed. CBC w/Diff Recent Labs     07/18/22  0514 07/16/22  1705 07/16/22  0953 07/16/22  0435 07/16/22  0435 07/15/22  1420 07/15/22  1420   WBC 10.4 18.8*  --   --  13.5*   < > 17.3*   RBC 2.75* 2.95*  --   --  2.60*   < > 3.04*   HGB 8.5* 9.1* 7.2*   < > 8.2*   < > 9.4*   HCT 25.7* 27.2* 21.2*   < > 23.9*   < > 28.0*   PLT 96* 109*  --   --  144   < > 174   GRANS 74* 78*  --   --   --   --  80*   LYMPH 17* 10*  --   --   --   --  11*   EOS 0 0  --   --   --   --  0    < > = values in this interval not displayed.       Coagulation Recent Labs     07/15/22  1420   PTP 17.8*   INR 1.4*   APTT 35.6       Lipid Panel Lab Results   Component Value Date/Time    Cholesterol, total 127 07/14/2022 05:39 AM    HDL Cholesterol 43 07/14/2022 05:39 AM    LDL, calculated 73.8 07/14/2022 05:39 AM    VLDL, calculated 10.2 07/14/2022 05:39 AM    Triglyceride 51 07/14/2022 05:39 AM    CHOL/HDL Ratio 3.0 07/14/2022 05:39 AM      Liver Enzymes Recent Labs     07/17/22  0444   TP 5.5*   ALB 3.6   AP 28*      Thyroid Studies Lab Results   Component Value Date/Time    TSH 0.68 07/13/2022 04:52 PM          Signed By: Garland Coelho PA-C     July 18, 2022

## 2022-07-18 NOTE — PROGRESS NOTES
1900-Bedside and Verbal shift change report given to This Writer-RN  by Juan Jesus . Report included the following information SBAR, Intake/Output, MAR, Recent Results, Cardiac Rhythm  , Alarm Parameters  and Quality Measures. Patient lying in bed in no apparent distress and care assumed. Sinus Rhythm on cardiac monitor with HR in 70's. VVI pacer device is connected  with rate set at 50. No changes. 2000- Productive cough and uses Yankauer suction device. Daughter, Edmundo Dixon called and status update was given by this writer. Patient denies pain. 2030- Request to be able to get some sleep. Music therapy and positioning in bed for comfort. Continues in SR on monitor. VSS. Will continue to monitor for changes. 2215- Appears to be sleeping comfortably. Uses Heart Huggar effectively and is encouraged to cough and deep breath and use Incentive Spirometry while awake. 0000-Continues in Sinus rhythm with occasional PVC's of record. VSS. No changes. 5797- AM labs drawn by . No changes. 0620-OOB up to bedside commode and then recliner chair with assist x 1 of this writer. Stands, transfers, and ambulates in room using Heart Huggar effectively. Patient tolerated procedure well. CHG bath was given and Sternal and BLE incisional drsgs changed. Incisions are approximated with staples. No erythema or bleeding. 8910- Chest X-Ray completed. 1900- Verbal shift change report given to Santi-RN  by This Writer-RN (offgoing nurse). Report included the following information SBAR, Procedure Summary, Intake/Output, MAR, Recent Results, Med Rec Status, Cardiac Rhythm   and Quality Measures. Patient is sitting up in recliner chair and using Incentive Spirometry reaching 500 ml.

## 2022-07-18 NOTE — DIABETES MGMT
Diabetes/ Glycemic Control Plan of Care    Assessment:   Consult for insulin drip    She is s/p CABG 7/15  Has transitioned off glucostabilizer and continues with a low dose of basal and corrective humalog as needed. No prior h/o DM  BG well controlled     DX:   1. NSTEMI (non-ST elevated myocardial infarction) ([facility] [address]  CARDIAC PROCEDURE    CARDIAC PROCEDURE   2. Chest pain, unspecified type     3. Tobacco abuse     4. Coronary artery disease involving native coronary artery of native heart with unstable angina pectoris ([facility] [address]     5. NSVT (nonsustained ventricular tachycardia) ([facility])     6. S/P CABG x 3        Blood glucose values: Within target range (70-180mg/dL):  yrd    Current insulin orders:   lantus 10 units daily  Corrective humaog    Total Daily Dose previous 24 hours = 13 units  5 units lantus  3 units humalog    Current A1c:   Lab Results   Component Value Date/Time    Hemoglobin A1c 5.9 (H) 07/13/2022 10:31 AM      Nutrition/Diet:   Active Orders   Diet    ADULT DIET Full Liquid; 4 carb choices (60 gm/meal); No Salt Added (3-4 gm); No Concentrated Sweets; Dislikes scrambled eggs      Home diabetes medications:   Key Antihyperglycemic Medications     Patient is on no antihyperglycemic meds.         Education:  [] Refer to Diabetes Education Record                       [x] Education not indicated at this time     Clint Major MPH RN [facility] [phone]

## 2022-07-18 NOTE — PROGRESS NOTES
CARDIAC SURGERY PROGRESS NOTE    2022  11:17 AM     CC:  Post Operative Day # 3     Chart, images and labs reviewed. Discussed with available staff. Interval History/Events of Past 24 hours:   Walks in ortiz with 2 L/min supplemental oxygen. No further pacing required. Maintaining sinus rhythm. Adequate urine on daily Lasix. Normal creatinine, mild azotemia. Right greater than left apical pneumothoraces with chest tubes on suction, 30. Airleak with cough to right pleural and mediastinal tubes. Total drainage 920/24 hours. Several liquid stools yesterday. Intermittent nausea with some food    Subjective:  Patient seen and examined on rounds today. Breathing improved today  Pain level: Moderate pain with coughing  Ambulating: With assistance  Sleeping: Fairly well  Eating: Poorly  Sternal pain: Yes    BM: Yes    Objective:  Vital signs:   Visit Vitals  BP (!) 99/53   Pulse 72   Temp 98.2 °F (36.8 °C)   Resp 18   Ht 5' 2\" (1.575 m)   Wt 55.2 kg (121 lb 9.6 oz)   SpO2 98%   BMI 22.24 kg/m²     Temp (24hrs), Av.8 °F (36.6 °C), Min:97.5 °F (36.4 °C), Max:98.5 °F (36.9 °C)    Admission Weight: Last Weight   Weight: 61.7 kg (136 lb) Weight: 55.2 kg (121 lb 9.6 oz)     Telemetry: SR 80    Physical Examination:     General:  Alert, oriented   Lungs: Fine rales at bases but clear to auscultation without wheezes or rhonchi. Chest: Dressings clean and dry. Heart: regular rate and rhythm, no murmur. + Rub  Abdomen: Soft and non-tender without masses. Bowel sounds present. Extremities: Warm and well perfused. Edema mild. Incisions: clean, dry, no drainage. Neuro: No deficit. Beta-blocker: On hold due to bradycardia  ASA: Yes   Statin: Yes  ACE-I: No  Diuretic: Yes     DVT Prophylaxis:   pneumatic compression boots: Yes  Compression stockings:  Yes  Enoxaparin:  Yes    Chest tubes:  present.   Air Leak:  Yes    Pacing wires:  present    DME needs:   TBD          Labs:  Lab Results   Component Value Date/Time    WBC 10.4 07/18/2022 05:14 AM    HCT 25.7 (L) 07/18/2022 05:14 AM    PLT 96 (L) 07/18/2022 05:14 AM      Lab Results   Component Value Date/Time     07/18/2022 05:14 AM    K 4.1 07/18/2022 05:14 AM     07/18/2022 05:14 AM    CO2 29 07/18/2022 05:14 AM     (H) 07/18/2022 05:14 AM    BUN 39 (H) 07/18/2022 05:14 AM    CREA 0.86 07/18/2022 05:14 AM    CREA 0.90 07/17/2022 04:44 AM    CREA 0.99 07/16/2022 05:05 PM     Assessment:  S/P  CABG x 4  Respiratory parameters stable  Cardiac status stable     Plans:  1. Continue to hold amiodarone and beta-blocker. Continue aspirin and statin. Will need Plavix when epicardial wires are out for severity of disease. 2.  Bronchial hygiene protocol. Add Mucinex and Pep. 3.  Keep chest tubes on suction. Chest x-ray in morning. 4.  Continue Lasix for now. Stop IV fluid. P.o. as tolerated. Cathartics stopped. We will continue stool softener. Chem-7 tomorrow morning. Discontinue Damon. 5.  Activity and I-S encouraged. May need rehab prior to discharge to daughter's house. 6.  We will keep in CVT ICU    Heart Hugger use is encouraged to mitigate pain and will also assist with deep breathing and incentive spirometry goals. Possible discharge 3 days. Mingo Forbes PA-C    PLEASE NOTE:  This document has been produced using voice recognition software. Unrecognized errors in transcription may be present. NOTE TO PATIENT:  The purpose of this note is to communicate optimally with the other providers involved in your care. It is written using standard medical terminology. If you have questions regarding details of the note please call my office at [phone] and make an appointment to discuss your concerns.

## 2022-07-19 ENCOUNTER — APPOINTMENT (OUTPATIENT)
Dept: GENERAL RADIOLOGY | Age: 62
DRG: 234 | End: 2022-07-19
Attending: PHYSICIAN ASSISTANT
Payer: COMMERCIAL

## 2022-07-19 LAB
GLUCOSE BLD STRIP.AUTO-MCNC: 105 MG/DL (ref 70–110)
GLUCOSE BLD STRIP.AUTO-MCNC: 107 MG/DL (ref 70–110)
GLUCOSE BLD STRIP.AUTO-MCNC: 116 MG/DL (ref 70–110)
GLUCOSE BLD STRIP.AUTO-MCNC: 94 MG/DL (ref 70–110)

## 2022-07-19 PROCEDURE — 82962 GLUCOSE BLOOD TEST: CPT

## 2022-07-19 PROCEDURE — 65620000000 HC RM CCU GENERAL

## 2022-07-19 PROCEDURE — 74011250636 HC RX REV CODE- 250/636: Performed by: PHYSICIAN ASSISTANT

## 2022-07-19 PROCEDURE — 74011250637 HC RX REV CODE- 250/637: Performed by: PHYSICIAN ASSISTANT

## 2022-07-19 PROCEDURE — 74011000250 HC RX REV CODE- 250: Performed by: PHYSICIAN ASSISTANT

## 2022-07-19 PROCEDURE — 36591 DRAW BLOOD OFF VENOUS DEVICE: CPT

## 2022-07-19 PROCEDURE — 97116 GAIT TRAINING THERAPY: CPT

## 2022-07-19 PROCEDURE — 2709999900 HC NON-CHARGEABLE SUPPLY

## 2022-07-19 PROCEDURE — 99232 SBSQ HOSP IP/OBS MODERATE 35: CPT | Performed by: INTERNAL MEDICINE

## 2022-07-19 PROCEDURE — 71045 X-RAY EXAM CHEST 1 VIEW: CPT

## 2022-07-19 PROCEDURE — 74011636637 HC RX REV CODE- 636/637: Performed by: PHYSICIAN ASSISTANT

## 2022-07-19 PROCEDURE — 74011250637 HC RX REV CODE- 250/637: Performed by: INTERNAL MEDICINE

## 2022-07-19 PROCEDURE — 99024 POSTOP FOLLOW-UP VISIT: CPT | Performed by: PHYSICIAN ASSISTANT

## 2022-07-19 PROCEDURE — 77030012390 HC DRN CHST BTL GTNG -B

## 2022-07-19 PROCEDURE — 97535 SELF CARE MNGMENT TRAINING: CPT

## 2022-07-19 PROCEDURE — APPNB45 APP NON BILLABLE 31-45 MINUTES: Performed by: PHYSICIAN ASSISTANT

## 2022-07-19 RX ADMIN — Medication 10 UNITS: at 08:20

## 2022-07-19 RX ADMIN — GUAIFENESIN 600 MG: 600 TABLET ORAL at 08:19

## 2022-07-19 RX ADMIN — HYDROCODONE BITARTRATE AND ACETAMINOPHEN 1 TABLET: 5; 325 TABLET ORAL at 22:50

## 2022-07-19 RX ADMIN — SODIUM CHLORIDE, PRESERVATIVE FREE 10 ML: 5 INJECTION INTRAVENOUS at 22:00

## 2022-07-19 RX ADMIN — HYDROCODONE BITARTRATE AND ACETAMINOPHEN 1 TABLET: 5; 325 TABLET ORAL at 05:24

## 2022-07-19 RX ADMIN — FAMOTIDINE 20 MG: 20 TABLET ORAL at 08:20

## 2022-07-19 RX ADMIN — ACETAMINOPHEN 650 MG: 325 TABLET ORAL at 08:20

## 2022-07-19 RX ADMIN — ASPIRIN 81 MG: 81 TABLET, COATED ORAL at 08:19

## 2022-07-19 RX ADMIN — DOCUSATE SODIUM 100 MG: 100 CAPSULE, LIQUID FILLED ORAL at 17:05

## 2022-07-19 RX ADMIN — CHLORHEXIDINE GLUCONATE 0.12% ORAL RINSE 10 ML: 1.2 LIQUID ORAL at 17:06

## 2022-07-19 RX ADMIN — ENOXAPARIN SODIUM 40 MG: 100 INJECTION SUBCUTANEOUS at 17:06

## 2022-07-19 RX ADMIN — FAMOTIDINE 20 MG: 20 TABLET ORAL at 17:05

## 2022-07-19 RX ADMIN — HYDROCODONE BITARTRATE AND ACETAMINOPHEN 1 TABLET: 5; 325 TABLET ORAL at 11:27

## 2022-07-19 RX ADMIN — GUAIFENESIN 600 MG: 600 TABLET ORAL at 22:07

## 2022-07-19 RX ADMIN — SODIUM CHLORIDE, PRESERVATIVE FREE 10 ML: 5 INJECTION INTRAVENOUS at 17:00

## 2022-07-19 RX ADMIN — DOCUSATE SODIUM 100 MG: 100 CAPSULE, LIQUID FILLED ORAL at 08:19

## 2022-07-19 RX ADMIN — SODIUM CHLORIDE, PRESERVATIVE FREE 10 ML: 5 INJECTION INTRAVENOUS at 05:25

## 2022-07-19 RX ADMIN — ATORVASTATIN CALCIUM 40 MG: 40 TABLET, FILM COATED ORAL at 22:07

## 2022-07-19 RX ADMIN — CHLORHEXIDINE GLUCONATE 0.12% ORAL RINSE 10 ML: 1.2 LIQUID ORAL at 08:20

## 2022-07-19 NOTE — PROGRESS NOTES
Problem: Mobility Impaired (Adult and Pediatric)  Goal: *Acute Goals and Plan of Care (Insert Text)  Description: Physical Therapy Goals  Initiated 7/17/2022 and to be accomplished within 7 day(s)  1. Patient will move from supine to sit and sit to supine , scoot up and down, and roll side to side in bed with supervision/set-up. 2.  Patient will transfer from bed to chair and chair to bed with supervision/set-up using the least restrictive device. 3.  Patient will perform sit to stand with supervision/set-up. 4.  Patient will ambulate with supervision/set-up for 300 feet with the least restrictive device. 5.  Patient will ascend/descend 12 stairs with unilateral handrail(s) with supervision/set-up. PLOF: Pt reporting she lives in [address], independent PTA, will be going to live with her daughter at discharge. Reporting her daughter lives in 2nd floor apartment. Outcome: Progressing Towards Goal   PHYSICAL THERAPY TREATMENT    Patient: Paulino Holland (1 y.o. female)  Date: 7/19/2022  Diagnosis: NSTEMI (non-ST elevated myocardial infarction) ([facility] [address] [I21.4] S/P CABG x 3  Procedure(s) (LRB):  CORONARY ARTERY BYPASS GRAFT (CABG) TIMES THREE (N/A) 4 Days Post-Op  Precautions: Fall,Sternal  ASSESSMENT:  Pt seated in chair, NAD, and agreeable to PT session. Excellent recall for sternal precautions and heart hugger uses, compliant throughout session. Observed chest tubes x2, pacer, and vital signs lines attached. STS with CGA to rollator. Ambulate 15ft to toilet with rollator and CGA. Sat with CGA, performed seated hygiene independently. Good standing balance with hand hygiene. Ambulate 45ftx2 with rollator and CGA. Demonstrated very slowed gait, requiring multiple standing rest breaks and verbal cues for deep breathing. Pt denied dizziness / SOB throughout session. Pt returned to room and sat in chair with CGA. Left seated in chair, all needs met and call bell in reach.  Educated on need for RN assistance with mobility; verbalized understanding. BP:  Seated prior to mobility: 92/53mmHg  Ambulation: 100/59mmHg  Standing post ambulation: 91/54  Seated post mobility: 106/56     Progression toward goals:   [x]      Improving appropriately and progressing toward goals  []      Improving slowly and progressing toward goals  []      Not making progress toward goals and plan of care will be adjusted     PLAN:  Patient continues to benefit from skilled intervention to address the above impairments. Continue treatment per established plan of care. Discharge Recommendations:  Home Health with family support  Further Equipment Recommendations for Discharge:  rolling walker    [facility] Basic Mobility Inpatient Short Form:  18/24   This [facility] score should be considered in conjunction with interdisciplinary team recommendations to determine the most appropriate discharge setting. Patient's social support, diagnosis, medical stability, and prior level of function should also be taken into consideration. AM-PAC score of 18/24 (14/20 with stairs omitted) or greater is typically associated with a discharge to the patient's home setting. Based on the patient's AM-PAC score and their current functional mobility deficits, it is recommended that the patient have 2-3 sessions per week of Physical Therapy at d/c to increase the patient's independence. Please see assessment section for further patient specific details. SUBJECTIVE:   Patient stated i'll try.     OBJECTIVE DATA SUMMARY:   Critical Behavior:  Neurologic State: Alert  Orientation Level: Oriented X4  Cognition: Follows commands  Safety/Judgement: Fall prevention  Psychosocial  Patient Behaviors: Calm; Cooperative  Pt Identified Daily Priority: Clinical issues (comment)  [employer] Process: Attend basic human needs;Nurture loving kindness; Supportive expression  Caring Interventions: Therapeutic modalities  Reassure:  Therapeutic listening;Quiet presence  Therapeutic Modalities: Humor  Functional Mobility:  Bed Mobility:    Transfers:  Sit to Stand: Contact guard assistance  Stand to Sit: Contact guard assistance  Balance:   Sitting: Intact  Standing: Impaired; With support  Standing - Static: Good  Standing - Dynamic : Fair  Ambulation/Gait Training:  Distance (ft):  (15ft, 45ftx2)   Assistive Device: Walker, rollator  Ambulation - Level of Assistance: Stand-by assistance  Gait Abnormalities: Shuffling gait; Step to gait      Pain:  Pain level pre-treatment: 0/10  Pain level post-treatment: 0/10     Activity Tolerance:   Fair    After treatment:   [x] Patient left in no apparent distress sitting up in chair  [] Patient left in no apparent distress in bed  [x] Call bell left within reach  [x] Nursing notified  [] Caregiver present  [] Bed alarm activated  [] SCDs applied      COMMUNICATION/EDUCATION:   [x]         Role of physical therapy in the acute care setting. [x]         Fall prevention education was provided and the patient/caregiver indicated understanding. [x]         Patient/family have participated as able in working toward goals and plan of care. [x]         Patient/family agree to work toward stated goals and plan of care. []         Patient understands intent and goals of therapy, but is neutral about his/her participation. []         Patient is unable to participate in stated goals/plan of care: ongoing with therapy staff. Susana Winston, SPT   Time Calculation: 29 mins    [facility] AM-PAC® Basic Mobility Inpatient Short Form (6-Clicks) Version 2    How much HELP from another person does the patient currently need    (If the patient hasn't done an activity recently, how much help from another person do you think he/she would need if he/she tried?)   Total (Total A or Dep)   A Lot  (Mod to Max A)   A Little (Sup or Min A)   None (Mod I to I)   Turning from your back to your side while in a flat bed without using bedrails?    [] 1 [] 2 [x] 3 [] 4 2. Moving from lying on your back to sitting on the side of a flat bed without using bedrails? [] 1 [] 2 [x] 3 [] 4   3. Moving to and from a bed to a chair (including a wheelchair)? [] 1 [] 2 [x] 3 [] 4   4. Standing up from a chair using your arms (e.g., wheelchair, or bedside chair)? [] 1 [] 2 [x] 3 [] 4   5. Walking in hospital room? [] 1 [] 2 [x] 3 [] 4   6. Climbing 3-5 steps with a railing?+   [] 1 [] 2 [x] 3 [] 4   +If stair climbing cannot be assessed, skip item #6. Sum responses from items 1-5.

## 2022-07-19 NOTE — PROGRESS NOTES
Problem: Pressure Injury - Risk of  Goal: *Prevention of pressure injury  Description: Document Vargas Scale and appropriate interventions in the flowsheet. Outcome: Progressing Towards Goal  Note: Pressure Injury Interventions:  Sensory Interventions: Assess changes in LOC,Assess need for specialty bed,Avoid rigorous massage over bony prominences,Chair cushion,Discuss PT/OT consult with provider    Moisture Interventions: Absorbent underpads,Check for incontinence Q2 hours and as needed,Minimize layers    Activity Interventions: Assess need for specialty bed,Chair cushion,Increase time out of bed    Mobility Interventions: Assess need for specialty bed,Chair cushion,Float heels,HOB 30 degrees or less,Pressure redistribution bed/mattress (bed type)    Nutrition Interventions: Document food/fluid/supplement intake    Friction and Shear Interventions: Apply protective barrier, creams and emollients,Lift sheet,Minimize layers,Transfer aides:transfer board/Foster lift/ceiling lift,Foam dressings/transparent film/skin sealants                Problem: Patient Education: Go to Patient Education Activity  Goal: Patient/Family Education  Outcome: Progressing Towards Goal     Problem: Falls - Risk of  Goal: *Absence of Falls  Description: Document Khadra Fall Risk and appropriate interventions in the flowsheet.   Outcome: Progressing Towards Goal  Note: Fall Risk Interventions:  Mobility Interventions: Communicate number of staff needed for ambulation/transfer,Patient to call before getting OOB         Medication Interventions: Assess postural VS orthostatic hypotension,Bed/chair exit alarm,Evaluate medications/consider consulting pharmacy    Elimination Interventions: Call light in reach,Patient to call for help with toileting needs    History of Falls Interventions: Evaluate medications/consider consulting pharmacy,Door open when patient unattended         Problem: Patient Education: Go to Patient Education Activity  Goal: Patient/Family Education  Outcome: Progressing Towards Goal     Problem: Ventilator Management  Goal: *Adequate oxygenation and ventilation  Outcome: Progressing Towards Goal  Goal: *Patient maintains clear airway/free of aspiration  Outcome: Progressing Towards Goal  Goal: *Absence of infection signs and symptoms  Outcome: Progressing Towards Goal  Goal: *Normal spontaneous ventilation  Outcome: Progressing Towards Goal     Problem: Patient Education: Go to Patient Education Activity  Goal: Patient/Family Education  Outcome: Progressing Towards Goal     Problem: Nutrition Deficit  Goal: *Optimize nutritional status  Outcome: Progressing Towards Goal     Problem: Patient Education: Go to Patient Education Activity  Goal: Patient/Family Education  Outcome: Progressing Towards Goal     Problem: CABG: Post-Op Day 2/Transfer Day  Goal: Off Pathway (Use only if patient is Off Pathway)  Outcome: Progressing Towards Goal  Goal: Activity/Safety  Outcome: Progressing Towards Goal  Goal: Consults, if ordered  Outcome: Progressing Towards Goal  Goal: Diagnostic Test/Procedures  Outcome: Progressing Towards Goal  Goal: Nutrition/Diet  Outcome: Progressing Towards Goal  Goal: Medications  Outcome: Progressing Towards Goal  Goal: Discharge Planning  Outcome: Progressing Towards Goal  Goal: Respiratory  Outcome: Progressing Towards Goal  Goal: Treatments/Interventions/Procedures  Outcome: Progressing Towards Goal  Goal: Psychosocial  Outcome: Progressing Towards Goal  Goal: *Hemodynamically stable without vasoactive medications  Outcome: Progressing Towards Goal  Goal: *Lungs clear or at baseline  Outcome: Progressing Towards Goal  Goal: *Optimal pain control at patient's stated goal  Outcome: Progressing Towards Goal  Goal: *Demonstrates progressive activity  Outcome: Progressing Towards Goal  Goal: *Tolerating diet  Outcome: Progressing Towards Goal     Problem: CABG: Post-Op Day 3  Goal: Off Pathway (Use only if patient is Off Pathway)  Outcome: Progressing Towards Goal  Goal: Activity/Safety  Outcome: Progressing Towards Goal  Goal: Consults, if ordered  Outcome: Progressing Towards Goal  Goal: Diagnostic Test/Procedures  Outcome: Progressing Towards Goal  Goal: Nutrition/Diet  Outcome: Progressing Towards Goal  Goal: Discharge Planning  Outcome: Progressing Towards Goal  Goal: Medications  Outcome: Progressing Towards Goal  Goal: Respiratory  Outcome: Progressing Towards Goal  Goal: Treatments/Interventions/Procedures  Outcome: Progressing Towards Goal  Goal: Psychosocial  Outcome: Progressing Towards Goal  Goal: *Hemodynamically stable  Outcome: Progressing Towards Goal  Goal: *Lungs clear or at baseline  Outcome: Progressing Towards Goal  Goal: *Optimal pain control at patient's stated goal  Outcome: Progressing Towards Goal  Goal: *Incisions without signs and symptoms of wound complication  Outcome: Progressing Towards Goal  Goal: *Demonstrates progressive activity  Outcome: Progressing Towards Goal  Goal: *Tolerating diet  Outcome: Progressing Towards Goal     Problem: CABG: Post-Op Day 4  Goal: Off Pathway (Use only if patient is Off Pathway)  Outcome: Progressing Towards Goal  Goal: Activity/Safety  Outcome: Progressing Towards Goal  Goal: Diagnostic Test/Procedures  Outcome: Progressing Towards Goal  Goal: Nutrition/Diet  Outcome: Progressing Towards Goal  Goal: Medications  Outcome: Progressing Towards Goal  Goal: Discharge Planning  Outcome: Progressing Towards Goal  Goal: Respiratory  Outcome: Progressing Towards Goal  Goal: Treatments/Interventions/Procedures  Outcome: Progressing Towards Goal  Goal: Psychosocial  Outcome: Progressing Towards Goal  Goal: *Hemodynamically stable  Outcome: Progressing Towards Goal  Goal: *Lungs clear or at baseline  Outcome: Progressing Towards Goal  Goal: *Optimal pain control at patient's stated goal  Outcome: Progressing Towards Goal  Goal: *Incisions without signs and symptoms of wound complication  Outcome: Progressing Towards Goal  Goal: *Demonstrates progressive activity  Outcome: Progressing Towards Goal  Goal: *Tolerating diet  Outcome: Progressing Towards Goal     Problem: CABG: Post-Op Day 5  Goal: Off Pathway (Use only if patient is Off Pathway)  Outcome: Progressing Towards Goal  Goal: Activity/Safety  Outcome: Progressing Towards Goal  Goal: Diagnostic Test/Procedures  Outcome: Progressing Towards Goal  Goal: Nutrition/Diet  Outcome: Progressing Towards Goal  Goal: Discharge Planning  Outcome: Progressing Towards Goal  Goal: Medications  Outcome: Progressing Towards Goal  Goal: Respiratory  Outcome: Progressing Towards Goal  Goal: Treatments/Interventions/Procedures  Outcome: Progressing Towards Goal  Goal: Psychosocial  Outcome: Progressing Towards Goal     Problem: CABG: Discharge Outcomes  Goal: *Hemodynamically stable  Outcome: Progressing Towards Goal  Goal: *Stable cardiac rhythm  Outcome: Progressing Towards Goal  Goal: *Lungs clear or at baseline  Outcome: Progressing Towards Goal  Goal: *Demonstrates ability to perform prescribed activity without shortness of breath or discomfort  Outcome: Progressing Towards Goal  Goal: *Verbalizes home exercise program, activity guidelines, cardiac precautions  Outcome: Progressing Towards Goal  Goal: *Optimal pain control at patient's stated goal  Outcome: Progressing Towards Goal  Goal: *Verbalizes understanding and describes prescribed diet  Outcome: Progressing Towards Goal  Goal: *Verbalizes name, dosage, time, side effects, and number of days to continue medications  Outcome: Progressing Towards Goal  Goal: *Weight  is stable  Outcome: Progressing Towards Goal  Goal: *No signs and symptoms of infection or wound complications  Outcome: Progressing Towards Goal  Goal: *Anxiety reduced or absent  Outcome: Progressing Towards Goal  Goal: *Verbalizes and demonstrates incision care  Outcome: Progressing Towards Goal  Goal: *Understands and describes signs and symptoms to report to providers(Stroke Metric)  Outcome: Progressing Towards Goal  Goal: *Describes follow-up/return visits to physicians  Outcome: Progressing Towards Goal  Goal: *Describes available resources and support systems  Outcome: Progressing Towards Goal  Goal: *Expresses feelings about diagnosis and surgery  Outcome: Progressing Towards Goal  Goal: *Influenza immunization  Outcome: Progressing Towards Goal  Goal: *Pneumococcal immunization  Outcome: Progressing Towards Goal

## 2022-07-19 NOTE — PROGRESS NOTES
Problem: Self Care Deficits Care Plan (Adult)  Goal: *Acute Goals and Plan of Care (Insert Text)  Description: Occupational Therapy Goals  Initiated 7/17/2022 within 7 day(s). 1.  Patient will perform grooming with supervision/set-up standing at the sink for >3 min with Good balance. 2.  Patient will perform upper body dressing with supervision/set-up. 3.  Patient will perform lower body dressing with supervision/set-up. 4.  Patient will perform toilet transfers with supervision/set-up. 5.  Patient will perform all aspects of toileting with supervision/set-up. 6.  Patient will recall 100% of sternal precautions and will follow them during ADLs w/o cues. 7.  Patient will utilize energy conservation techniques during functional activities w/o cues. Prior Level of Function: Pt reports being Mod Ind for ADLs and functional mobility. Pt will be staying with her daughter upon DC. Outcome: Progressing Towards Goal  OCCUPATIONAL THERAPY TREATMENT    Patient: Fredy Reddy (1 y.o. female)  Date: 7/19/2022  Diagnosis: NSTEMI (non-ST elevated myocardial infarction) ([facility] [address] [I21.4] S/P CABG x 3  Procedure(s) (LRB):  CORONARY ARTERY BYPASS GRAFT (CABG) TIMES THREE (N/A) 4 Days Post-Op  Precautions: Fall,Skin,Sternal      Chart, occupational therapy assessment, plan of care, and goals were reviewed. ASSESSMENT:  Patient cleared to participate in OT treatment session by RN. Upon entering the room, the patient was seated in chair, alert, and agreeable to participate in OT evaluation. Patient re-educated on sternal precautions, heart hugger, importance of getting up and moving at least 3x/day per protocol, and safety during this hospital admission. Extensive encouragement on heart hugger use this session to mitigate pain and assist with deep breathing and incentive spirometry goals. Patient educated on energy conservation techniques, pursed lip breathing, and self pacing during daily activities.  Patient with SOB noted this session, breaks in sentences while talking; O2 remained > 90% throughout. Patient performed functional transfers in preparation for self-care tasks with contact guard assist and lower body dressing seated with socks with stand by assist assist. Discussed 2 page handout of sternal precautions with patient. Will benefit from upper body dressing/heart hugger training next session as patient daughter bringing in her button up shirt. Patient left seated in chair, all needs met and call bell in reach. Progression toward goals:  [x]          Improving appropriately and progressing toward goals  []          Improving slowly and progressing toward goals  []          Not making progress toward goals and plan of care will be adjusted     PLAN:  Patient continues to benefit from skilled intervention to address the above impairments. Continue treatment per established plan of care. Discharge Recommendations:  Home Health with increased supervision  Further Equipment Recommendations for Discharge:  shower chair to conserve energy and rolling walker    [facility]: Jazmin Moore scored a 19/24 on the AM-PAC ADL Inpatient form. Current research shows that an AM-PAC score of 18 or greater is associated with a discharge to the patient's home setting. Based on the patient's AM-PAC score and their current ADL deficits, it is recommended that the patient have 2-3 sessions per week of Occupational Therapy at d/c to increase the patient's independence. Please see assessment section for further patient specific details. SUBJECTIVE:   Patient stated you mean I cant lift my 3year old grandson? !    OBJECTIVE DATA SUMMARY:   Cognitive/Behavioral Status:  Neurologic State: Alert  Orientation Level: Oriented X4  Cognition: Follows commands  Safety/Judgement: Fall prevention    Functional Mobility and Transfers for ADLs:   Bed Mobility:  Scooting: Contact guard assistance (vcs for holding heart hugger; not pushing fwd w arms)     Transfers:  Sit to Stand: Contact guard assistance  Stand to Sit: Contact guard assistance   Toilet Transfer : Contact guard assistance (simulation with recliner)      ADL Intervention:  Upper Body Dressing Assistance  Front Opened Shirt:  (discussed techniques w patient, dtr to bring in button up)    Lower Body Dressing Assistance  Dressing Assistance: Stand-by assistance  Socks: Stand-by assistance (rest break needed between B socks)  Leg Crossed Method Used: Yes  Position Performed: Seated in chair    Cognitive Retraining  Safety/Judgement: Fall prevention    Pain:  Pain level pre-treatment: 4/10 , chest  Pain level post-treatment: 4/10  Pain Intervention(s): Medication (see MAR); Rest, Ice, Repositioning   Response to intervention: Nurse notified, See doc flow    Activity Tolerance:    Fair+      Please refer to the flowsheet for vital signs taken during this treatment. After treatment:   [x]  Patient left in no apparent distress sitting up in chair  []  Patient left in no apparent distress in bed  [x]  Call bell left within reach  [x]  Nursing notified  []  Caregiver present  []  Bed alarm activated    COMMUNICATION/EDUCATION:   [x] Role of Occupational Therapy in the acute care setting  [x] Home safety education was provided and the patient/caregiver indicated understanding. [x] Patient/family have participated as able in working towards goals and plan of care. [x] Patient/family agree to work toward stated goals and plan of care. [] Patient understands intent and goals of therapy, but is neutral about his/her participation. [] Patient is unable to participate in goal setting and plan of care.       Thank you for this referral.  Tony Barriga, OTR/L  Time Calculation: 29 mins    [facility]on AM-PAC® Daily Activity Inpatient Short Form (6-Clicks)*    How much HELP from another person does the patient currently need    (If the patient hasn't done an activity recently, how much help from another person do you think he/she would need if he/she tried?)   Total (Total A or Dep)   A Lot  (Mod to Max A)   A Little (Sup or Min A)   None (Mod I to I)   Putting on and taking off regular lower body clothing? [] 1 [] 2 [x] 3 [] 4   2. Bathing (including washing, rinsing,      drying)? [] 1 [] 2 [x] 3 [] 4   3. Toileting, which includes using toilet, bedpan or urinal?   [] 1 [] 2 [x] 3 [] 4   4. Putting on and taking off regular upper body clothing? [] 1 [] 2 [x] 3 [] 4   5. Taking care of personal grooming such as brushing teeth? [] 1 [] 2 [x] 3 [] 4   6. Eating meals?    [] 1 [] 2 [] 3 [x] 4

## 2022-07-19 NOTE — PROGRESS NOTES
14:00  Assumed care of pt from Mariam Ashton RN. Pt. In recliner. A&O x 4. Denies pain. GREER Brandon at bedside removing left pleural chest tube. Right pleural and mediastinal to atrium on -20 cm constant wall suction with intermittent air leak. VSS. Pt on r/a with sp02 94%    Wound Prevention Checklist    Patient: Gissell Hernandez (86 y.o. female)  Date: 7/19/2022  Diagnosis: NSTEMI (non-ST elevated myocardial infarction) ([facility] [address] [I21.4] S/P CABG x 3    Precautions: Fall,Sternal       []  Heel prevention boots placed on patient    []  Patient turned q2h during shift    []  Lift team ordered    []  Patient on [place] bed/Specialty bed    []  Each Wound is documented during shift (Stage, Color, drainage, odor, measurements, and dressings)    [x]  Dual skin checks done at bedside during shift report with JUMANA Garcia RN   18:00  Changed atrium to chest tubes. 19:00  Pt ambulated approx. 100 feet with no c/o SOB, dizziness. Sp02 greater than 94% during ambulation. HR increased to 104 bpm but recovered within 10 minutes when walk finished. Bedside and Verbal shift change report given to Alex Aguilar RN (oncoming nurse) by Darren Salter RN (offgoing nurse). Report included the following information SBAR, Kardex and OR Summary, recent results, cardiac rhythm sinus rhythm.

## 2022-07-19 NOTE — PROGRESS NOTES
Cardiology Progress Note    Admit Date: 7/13/2022  Attending Cardiologist: Dr. Marco Carrasco    Assessment:     -CAD, s/p CABG x 3 (LIMA-LAD, SVG-diagonal, SVG-OM) by Dr. Sher Still 7/15/2022  -NSTEMI. Nolene Honor  mg and SL NTG x 1 at Urgent Care. [facility] [address] [facility] cath 7/13/2022 with findings as follows:  · LM: Angiographically normal.  · LAD: Mid LAD has diffuse up to 70-75% long disease.  Distal vessel appeared approximately 2 mm in size without any significant obstructive disease.  Diagonal branch with 99% ostial disease, bifurcating vessel, approximately 1.5-1.75 mm vessel. · LCx: Ostial and proximal 90% stenosis of native LCx.  High OM branch is 95-99% stenosis with faint forward flow.  This vessel appears very small caliber.  OM2 and OM3: Small caliber vessel with luminal irregularities. · RCA: Proximal and mid diffuse disease.  Distally severe disease. Dewitte Peto is evidence of left to right collateral.  -Echo 1/30/1771: Ahmad Opal LV systolic function with EF 55-60% with normal wall motion, normal LV size, normal wall thickness, normal diastolic function, no significant valvular disease.  -Tobacco abuse, approx. 20 pack-year smoking history     No primary cardiologist, initial referral completed by Dr. Ely El:     -Continue routine post-operative care per cardiothoracic surgery team, appreciate assistance.  -Continue ASA, Lipitor. Not on BB due to bradycardia over the weekend, rhythm stable since Sunday afternoon.  -Increase mobility as able. -Encourage incentive spirometry. Progressing well post CABG. Still has 1 chest tube left in which will be removed per surgery. Still holding beta-blockers. Should be able to start tomorrow. Continues on diuretics. Watch blood pressure closely as may be getting volume depleted as BUN is rising. Hopefully home soon. Subjective:     No new complaints.      Objective:      Patient Vitals for the past 8 hrs:   Temp Pulse Resp BP SpO2   07/19/22 0845 -- -- -- -- 95 % 07/19/22 0810 98.8 °F (37.1 °C) 69 14 (!) 92/54 96 %   07/19/22 0800 -- 69 28 (!) 94/53 94 %   07/19/22 0600 -- 68 14 (!) 110/58 --   07/19/22 0500 -- 69 24 -- 94 %   07/19/22 0400 98.2 °F (36.8 °C) 67 12 (!) 96/54 92 %   07/19/22 0300 -- 67 13 (!) 100/56 --         Patient Vitals for the past 96 hrs:   Weight   07/19/22 0400 55 kg (121 lb 4.1 oz)   07/17/22 0800 55.2 kg (121 lb 9.6 oz)       TELE: normal sinus rhythm               Current Facility-Administered Medications   Medication Dose Route Frequency Last Admin    guaiFENesin ER (MUCINEX) tablet 600 mg  600 mg Oral Q12H 600 mg at 07/19/22 0819    furosemide (LASIX) tablet 40 mg  40 mg Oral DAILY 40 mg at 07/18/22 0833    albuterol-ipratropium (DUO-NEB) 2.5 MG-0.5 MG/3 ML  3 mL Nebulization Q6H PRN      insulin glargine (LANTUS) injection 10 Units  10 Units SubCUTAneous DAILY 10 Units at 07/19/22 0820    insulin lispro (HUMALOG) injection   SubCUTAneous AC&HS 3 Units at 07/17/22 2139    glucose chewable tablet 16 g  4 Tablet Oral PRN      glucagon (GLUCAGEN) injection 1 mg  1 mg IntraMUSCular PRN      dextrose 10% infusion 0-250 mL  0-250 mL IntraVENous PRN      lidocaine (XYLOCAINE) 2 % viscous solution 15 mL  15 mL Mucous Membrane PRN      morphine injection 2-4 mg  2-4 mg IntraVENous Q2H PRN 2 mg at 07/16/22 1917    sodium chloride (NS) flush 5-40 mL  5-40 mL IntraVENous Q8H 10 mL at 07/19/22 0525    sodium chloride (NS) flush 5-40 mL  5-40 mL IntraVENous PRN      acetaminophen (TYLENOL) tablet 650 mg  650 mg Oral Q4H  mg at 07/19/22 0820    HYDROcodone-acetaminophen (NORCO) 5-325 mg per tablet 1-2 Tablet  1-2 Tablet Oral Q6H PRN 1 Tablet at 07/19/22 0524    naloxone (NARCAN) injection 0.4 mg  0.4 mg IntraVENous PRN      albuterol (PROVENTIL VENTOLIN) nebulizer solution 2.5 mg  2.5 mg Nebulization Q4H PRN      [Held by provider] metoprolol tartrate (LOPRESSOR) tablet 12.5 mg  12.5 mg Oral Q12H      ondansetron (ZOFRAN) injection 4 mg  4 mg IntraVENous Q4H PRN 4 mg at 07/16/22 0248    aspirin delayed-release tablet 81 mg  81 mg Oral DAILY 81 mg at 07/19/22 0819    chlorhexidine (PERIDEX) 0.12 % mouthwash 10 mL  10 mL Oral BID 10 mL at 07/19/22 0820    docusate sodium (COLACE) capsule 100 mg  100 mg Oral  mg at 07/19/22 0819    ELECTROLYTE REPLACEMENT PROTOCOL - Potassium Standard Dosing  1 Each Other PRN      ELECTROLYTE REPLACEMENT PROTOCOL - Magnesium  1 Each Other PRN      enoxaparin (LOVENOX) injection 40 mg  40 mg SubCUTAneous Q24H 40 mg at 07/18/22 1848    famotidine (PEPCID) tablet 20 mg  20 mg Oral BID 20 mg at 07/19/22 0820    sodium chloride (NS) flush 5-40 mL  5-40 mL IntraVENous PRN      nicotine (NICODERM CQ) 14 mg/24 hr patch 1 Patch  1 Patch TransDERmal DAILY 1 Patch at 07/19/22 0820    atorvastatin (LIPITOR) tablet 40 mg  40 mg Oral QHS 40 mg at 07/18/22 2238         Intake/Output Summary (Last 24 hours) at 7/19/2022 1013  Last data filed at 7/19/2022 0843  Gross per 24 hour   Intake 360 ml   Output 1145 ml   Net -785 ml       Physical Exam:  General:  alert, cooperative, no distress, appears stated age  Neck:  supple  Lungs:  clear to auscultation bilaterally  Heart:  regular rate and rhythm  Abdomen:  abdomen is soft without significant tenderness, masses, organomegaly or guarding  Extremities:  Atraumatic, no edema    Visit Vitals  BP (!) 92/54   Pulse 69   Temp 98.8 °F (37.1 °C)   Resp 14   Ht 5' 2\" (1.575 m)   Wt 55 kg (121 lb 4.1 oz)   SpO2 95%   BMI 22.18 kg/m²       Data Review:     Labs: Results:       Chemistry Recent Labs     07/18/22  0514 07/17/22  0444 07/16/22  1705   * 150* 150*    141 144   K 4.1 5.1 4.4    110 113*   CO2 29 27 22   BUN 39* 26* 19*   CREA 0.86 0.90 0.99   CA 8.1* 8.3* 7.8*   MG 2.5 1.9  --    AGAP 4 4 9   BUCR 45* 29* 19   AP  --  28*  --    TP  --  5.5*  --    ALB  --  3.6  --    GLOB  --  1.9*  --    AGRAT  --  1.9*  --       CBC w/Diff Recent Labs 07/18/22  0514 07/16/22  1705   WBC 10.4 18.8*   RBC 2.75* 2.95*   HGB 8.5* 9.1*   HCT 25.7* 27.2*   PLT 96* 109*   GRANS 74* 78*   LYMPH 17* 10*   EOS 0 0      Lipid Panel Lab Results   Component Value Date/Time    Cholesterol, total 127 07/14/2022 05:39 AM    HDL Cholesterol 43 07/14/2022 05:39 AM    LDL, calculated 73.8 07/14/2022 05:39 AM    VLDL, calculated 10.2 07/14/2022 05:39 AM    Triglyceride 51 07/14/2022 05:39 AM    CHOL/HDL Ratio 3.0 07/14/2022 05:39 AM      Liver Enzymes Recent Labs     07/17/22  0444   TP 5.5*   ALB 3.6   AP 28*      Thyroid Studies Lab Results   Component Value Date/Time    TSH 0.68 07/13/2022 04:52 PM          Signed By: Garland Coelho PA-C     July 19, 2022      I have personally and independently evaluated and examined the patient. All relevant labs and testing data are reviewed. I have personally reviewed all the diagnostic labs, available EKG imaging x-rays and other diagnostic data and incorporated them into my care. I am referencing APC's note. I participated in medical decision making. Care plan discussed and updated after review. More than 50% time spent reviewing data, examining patient and recommending assessment and plan.   Alba Olvera MD

## 2022-07-19 NOTE — PROGRESS NOTES
CARDIAC SURGERY PROGRESS NOTE    2022  11:46 AM     CC:  Post Operative Day # 4     Chart, images and labs reviewed. Discussed with available staff. Interval History/Events of Past 24 hours:   Walks in ortiz without supplemental oxygen. Still with secretions. No pacing needs or further bradycardia. Good urine output on Lasix. Right apical pneumothorax with intermittent air leak with cough. No further nausea. Subjective:  Patient seen and examined on rounds today. Acceptable procedural pain  Pain level: Controlled  Ambulating: Fairly well   Sleeping: Fairly well  Eating: Fairly well  Sternal pain: Yes    BM: Yes    Objective:  Vital signs:   Visit Vitals  BP (!) 100/52   Pulse 70   Temp 98.8 °F (37.1 °C)   Resp 22   Ht 5' 2\" (1.575 m)   Wt 55 kg (121 lb 4.1 oz)   SpO2 93%   BMI 22.18 kg/m²     Temp (24hrs), Av.2 °F (36.8 °C), Min:97.9 °F (36.6 °C), Max:98.8 °F (37.1 °C)    Admission Weight: Last Weight   Weight: 61.7 kg (136 lb) Weight: 55 kg (121 lb 4.1 oz)     Telemetry: Sinus rhythm 80    Physical Examination:     General:  Alert, oriented   Lungs: Clear to auscultation without rales, wheezes or rhonchi. Chest: Dressings clean and dry. Heart: regular rate and rhythm, no murmur. Abdomen: Soft and non-tender without masses. Bowel sounds present. Extremities: Warm and well perfused. Edema absent. Incisions: clean, dry, no drainage. Neuro: No deficit.     Beta-blocker: Contraindicated due to bradycardia and junctional rhythm  ASA: Yes   Statin: Yes  ACE-I: No  Diuretic: Yes     DVT Prophylaxis:   pneumatic compression boots: Yes  Compression stockings:  Yes  Enoxaparin:  Yes    DME needs: Home health with services TBD          Labs:  Lab Results   Component Value Date/Time    WBC 10.4 2022 05:14 AM    HCT 25.7 (L) 2022 05:14 AM    PLT 96 (L) 2022 05:14 AM      Lab Results   Component Value Date/Time     2022 05:14 AM    K 4.1 2022 05:14 AM     07/18/2022 05:14 AM    CO2 29 07/18/2022 05:14 AM     (H) 07/18/2022 05:14 AM    BUN 39 (H) 07/18/2022 05:14 AM    CREA 0.86 07/18/2022 05:14 AM    CREA 0.90 07/17/2022 04:44 AM    CREA 0.99 07/16/2022 05:05 PM       CXR: Stable right apical pneumothorax. No left apical pneumo seen. Tiny right greater than left pleural effusion    Assessment:  S/P  CABG x 3  Respiratory parameters stable  Cardiac status stable     Plans:  1. Continue to hold amiodarone and beta-blocker. Continue aspirin, statin. We will add Plavix tomorrow if platelets stable. 2.  Lasix held this morning for asymptomatic blood pressure in the upper 90s. Will give later if pressure greater than 100. BMP tomorrow. 3.  Epicardial pacing wire removed without difficulty. Will remove left pleural chest tube today. Chest x-ray tomorrow. 4.  Follow-up thrombocytopenia tomorrow. Heart Hugger use is encouraged to mitigate pain and will also assist with deep breathing and incentive spirometry goals. Possible discharge 3 days. Yaw Mcgrath PA-C    PLEASE NOTE:  This document has been produced using voice recognition software. Unrecognized errors in transcription may be present. NOTE TO PATIENT:  The purpose of this note is to communicate optimally with the other providers involved in your care. It is written using standard medical terminology. If you have questions regarding details of the note please call my office at [phone] and make an appointment to discuss your concerns.

## 2022-07-20 ENCOUNTER — APPOINTMENT (OUTPATIENT)
Dept: GENERAL RADIOLOGY | Age: 62
DRG: 234 | End: 2022-07-20
Attending: PHYSICIAN ASSISTANT
Payer: COMMERCIAL

## 2022-07-20 LAB
ANION GAP SERPL CALC-SCNC: 2 MMOL/L (ref 3–18)
BASOPHILS # BLD: 0 K/UL (ref 0–0.1)
BASOPHILS NFR BLD: 0 % (ref 0–2)
BUN SERPL-MCNC: 29 MG/DL (ref 7–18)
BUN/CREAT SERPL: 41 (ref 12–20)
CALCIUM SERPL-MCNC: 8 MG/DL (ref 8.5–10.1)
CHLORIDE SERPL-SCNC: 103 MMOL/L (ref 100–111)
CO2 SERPL-SCNC: 30 MMOL/L (ref 21–32)
CREAT SERPL-MCNC: 0.71 MG/DL (ref 0.6–1.3)
DIFFERENTIAL METHOD BLD: ABNORMAL
EOSINOPHIL # BLD: 0.1 K/UL (ref 0–0.4)
EOSINOPHIL NFR BLD: 1 % (ref 0–5)
ERYTHROCYTE [DISTWIDTH] IN BLOOD BY AUTOMATED COUNT: 13.9 % (ref 11.6–14.5)
GLUCOSE BLD STRIP.AUTO-MCNC: 108 MG/DL (ref 70–110)
GLUCOSE BLD STRIP.AUTO-MCNC: 79 MG/DL (ref 70–110)
GLUCOSE BLD STRIP.AUTO-MCNC: 94 MG/DL (ref 70–110)
GLUCOSE BLD STRIP.AUTO-MCNC: 98 MG/DL (ref 70–110)
GLUCOSE SERPL-MCNC: 88 MG/DL (ref 74–99)
HCT VFR BLD AUTO: 24.1 % (ref 35–45)
HGB BLD-MCNC: 7.8 G/DL (ref 12–16)
IMM GRANULOCYTES # BLD AUTO: 0 K/UL (ref 0–0.04)
IMM GRANULOCYTES NFR BLD AUTO: 0 % (ref 0–0.5)
LYMPHOCYTES # BLD: 1.6 K/UL (ref 0.9–3.6)
LYMPHOCYTES NFR BLD: 21 % (ref 21–52)
MAGNESIUM SERPL-MCNC: 2.1 MG/DL (ref 1.6–2.6)
MCH RBC QN AUTO: 30.8 PG (ref 24–34)
MCHC RBC AUTO-ENTMCNC: 32.4 G/DL (ref 31–37)
MCV RBC AUTO: 95.3 FL (ref 78–100)
MONOCYTES # BLD: 1 K/UL (ref 0.05–1.2)
MONOCYTES NFR BLD: 12 % (ref 3–10)
NEUTS SEG # BLD: 5.1 K/UL (ref 1.8–8)
NEUTS SEG NFR BLD: 65 % (ref 40–73)
NRBC # BLD: 0.09 K/UL (ref 0–0.01)
NRBC BLD-RTO: 1.2 PER 100 WBC
PLATELET # BLD AUTO: 133 K/UL (ref 135–420)
PMV BLD AUTO: 11.8 FL (ref 9.2–11.8)
POTASSIUM SERPL-SCNC: 3.7 MMOL/L (ref 3.5–5.5)
RBC # BLD AUTO: 2.53 M/UL (ref 4.2–5.3)
SODIUM SERPL-SCNC: 135 MMOL/L (ref 136–145)
WBC # BLD AUTO: 7.8 K/UL (ref 4.6–13.2)

## 2022-07-20 PROCEDURE — 74011250637 HC RX REV CODE- 250/637: Performed by: PHYSICIAN ASSISTANT

## 2022-07-20 PROCEDURE — 74011250636 HC RX REV CODE- 250/636: Performed by: PHYSICIAN ASSISTANT

## 2022-07-20 PROCEDURE — 74011250637 HC RX REV CODE- 250/637: Performed by: INTERNAL MEDICINE

## 2022-07-20 PROCEDURE — 65620000000 HC RM CCU GENERAL

## 2022-07-20 PROCEDURE — 99233 SBSQ HOSP IP/OBS HIGH 50: CPT | Performed by: PHYSICIAN ASSISTANT

## 2022-07-20 PROCEDURE — 71045 X-RAY EXAM CHEST 1 VIEW: CPT

## 2022-07-20 PROCEDURE — 97116 GAIT TRAINING THERAPY: CPT

## 2022-07-20 PROCEDURE — 74011000250 HC RX REV CODE- 250: Performed by: PHYSICIAN ASSISTANT

## 2022-07-20 PROCEDURE — 85025 COMPLETE CBC W/AUTO DIFF WBC: CPT

## 2022-07-20 PROCEDURE — 83735 ASSAY OF MAGNESIUM: CPT

## 2022-07-20 PROCEDURE — 2709999900 HC NON-CHARGEABLE SUPPLY

## 2022-07-20 PROCEDURE — 74011250637 HC RX REV CODE- 250/637: Performed by: THORACIC SURGERY (CARDIOTHORACIC VASCULAR SURGERY)

## 2022-07-20 PROCEDURE — 97535 SELF CARE MNGMENT TRAINING: CPT

## 2022-07-20 PROCEDURE — 80048 BASIC METABOLIC PNL TOTAL CA: CPT

## 2022-07-20 PROCEDURE — APPNB30 APP NON BILLABLE TIME 0-30 MINS: Performed by: PHYSICIAN ASSISTANT

## 2022-07-20 PROCEDURE — 94762 N-INVAS EAR/PLS OXIMTRY CONT: CPT

## 2022-07-20 PROCEDURE — 82962 GLUCOSE BLOOD TEST: CPT

## 2022-07-20 RX ORDER — POTASSIUM CHLORIDE 20 MEQ/1
20 TABLET, EXTENDED RELEASE ORAL ONCE
Status: COMPLETED | OUTPATIENT
Start: 2022-07-20 | End: 2022-07-20

## 2022-07-20 RX ORDER — CLOPIDOGREL BISULFATE 75 MG/1
75 TABLET ORAL DAILY
Status: DISCONTINUED | OUTPATIENT
Start: 2022-07-20 | End: 2022-07-25 | Stop reason: HOSPADM

## 2022-07-20 RX ADMIN — GUAIFENESIN 600 MG: 600 TABLET ORAL at 08:43

## 2022-07-20 RX ADMIN — HYDROCODONE BITARTRATE AND ACETAMINOPHEN 1 TABLET: 5; 325 TABLET ORAL at 18:33

## 2022-07-20 RX ADMIN — CLOPIDOGREL BISULFATE 75 MG: 75 TABLET ORAL at 15:05

## 2022-07-20 RX ADMIN — DOCUSATE SODIUM 100 MG: 100 CAPSULE, LIQUID FILLED ORAL at 18:01

## 2022-07-20 RX ADMIN — DOCUSATE SODIUM 100 MG: 100 CAPSULE, LIQUID FILLED ORAL at 08:43

## 2022-07-20 RX ADMIN — FAMOTIDINE 20 MG: 20 TABLET ORAL at 18:01

## 2022-07-20 RX ADMIN — HYDROCODONE BITARTRATE AND ACETAMINOPHEN 1 TABLET: 5; 325 TABLET ORAL at 10:36

## 2022-07-20 RX ADMIN — CHLORHEXIDINE GLUCONATE 0.12% ORAL RINSE 10 ML: 1.2 LIQUID ORAL at 08:43

## 2022-07-20 RX ADMIN — FAMOTIDINE 20 MG: 20 TABLET ORAL at 08:43

## 2022-07-20 RX ADMIN — SODIUM CHLORIDE, PRESERVATIVE FREE 10 ML: 5 INJECTION INTRAVENOUS at 06:59

## 2022-07-20 RX ADMIN — METOPROLOL TARTRATE 12.5 MG: 25 TABLET, FILM COATED ORAL at 21:41

## 2022-07-20 RX ADMIN — POTASSIUM CHLORIDE 20 MEQ: 1500 TABLET, EXTENDED RELEASE ORAL at 08:43

## 2022-07-20 RX ADMIN — SODIUM CHLORIDE, PRESERVATIVE FREE 10 ML: 5 INJECTION INTRAVENOUS at 21:42

## 2022-07-20 RX ADMIN — ENOXAPARIN SODIUM 40 MG: 100 INJECTION SUBCUTANEOUS at 18:01

## 2022-07-20 RX ADMIN — SODIUM CHLORIDE, PRESERVATIVE FREE 10 ML: 5 INJECTION INTRAVENOUS at 14:00

## 2022-07-20 RX ADMIN — ASPIRIN 81 MG: 81 TABLET, COATED ORAL at 08:43

## 2022-07-20 RX ADMIN — GUAIFENESIN 600 MG: 600 TABLET ORAL at 21:41

## 2022-07-20 RX ADMIN — ATORVASTATIN CALCIUM 40 MG: 40 TABLET, FILM COATED ORAL at 21:41

## 2022-07-20 NOTE — PROGRESS NOTES
Problem: Mobility Impaired (Adult and Pediatric)  Goal: *Acute Goals and Plan of Care (Insert Text)  Description: Physical Therapy Goals  Initiated 7/17/2022 and to be accomplished within 7 day(s)  1. Patient will move from supine to sit and sit to supine , scoot up and down, and roll side to side in bed with supervision/set-up. 2.  Patient will transfer from bed to chair and chair to bed with supervision/set-up using the least restrictive device. 3.  Patient will perform sit to stand with supervision/set-up. 4.  Patient will ambulate with supervision/set-up for 300 feet with the least restrictive device. 5.  Patient will ascend/descend 12 stairs with unilateral handrail(s) with supervision/set-up. PLOF: Pt reporting she lives in [address] [facility], independent PTA, will be going to live with her daughter at discharge. Reporting her daughter lives in 2nd floor apartment. Outcome: Progressing Towards Goal   PHYSICAL THERAPY TREATMENT    Patient: Spencer Baker (55 y.o. female)  Date: 7/20/2022  Diagnosis: NSTEMI (non-ST elevated myocardial infarction) ([facility] [address] [I21.4] S/P CABG x 3  Procedure(s) (LRB):  CORONARY ARTERY BYPASS GRAFT (CABG) TIMES THREE (N/A) 5 Days Post-Op  Precautions: Fall, Sternal      ASSESSMENT:  Pt cleared to participate in PT session, pt received sitting up in recliner and agreeable to therapy session. Pt able to verbalize all sternal precautions with intermittent verbal cues for use of heart hugger during session. Standing with SBA to rollator. Pt continues to have 1 chest tube. Ambulating with SBA x10 feet to toilet, toileting with supervision. Pt standing with SBA, donned gown and ambulating x300 feet in ortiz with slow gait speed and rollator, SBA. Pt ascending/descending 3 steps with R handrail and step to gait pattern. Pt returned to room and sitting in recliner with SBA. Pt positioned for comfort and educated to call for assist before getting up, pt verbalized understanding.  Pt left with all needs met and call bell in reach. RN notified of position and participation. Progression toward goals:   []      Improving appropriately and progressing toward goals  [x]      Improving slowly and progressing toward goals  []      Not making progress toward goals and plan of care will be adjusted     PLAN:  Patient continues to benefit from skilled intervention to address the above impairments. Continue treatment per established plan of care. Discharge Recommendations:  Home Health with family support as needed   Further Equipment Recommendations for Discharge:  shower chair, rolling walker, and N/A    AMPAC: Gissell Hernandez scored a 18/24 on the AM-PAC short mobility form. Current research shows that an AM-PAC score of 18 (14 if omitting stairs) or greater is typically associated with a discharge to the patient's home setting. Based on the patient's AM-PAC score and their current functional mobility deficits, it is recommended that the patient have 2-3 sessions per week of Physical Therapy at d/c to increase the patient's independence. At this time, this patient demonstrates the endurance and safety to discharge home with [address] (home vs OP services) and a follow up treatment frequency of 2-3x/wk. Please see assessment section for further patient specific details. This AMPAC score should be considered in conjunction with interdisciplinary team recommendations to determine the most appropriate discharge setting. Patient's social support, diagnosis, medical stability, and prior level of function should also be taken into consideration. SUBJECTIVE:   Patient stated It is going to take me awhile to do all my daughter's stairs.     OBJECTIVE DATA SUMMARY:   Critical Behavior:  Neurologic State: Alert  Orientation Level: Oriented X4  Cognition: Appropriate decision making, Appropriate safety awareness, Follows commands  Safety/Judgement: Fall prevention  Functional Mobility Training:  Bed Mobility:     Supine to Sit:  (found up in chair)     Scooting: Minimum assistance (in chair)    Transfers:  Sit to Stand: Stand-by assistance  Stand to Sit: Stand-by assistance    Balance:  Sitting: Intact  Sitting - Static: Good (unsupported)  Sitting - Dynamic: Good (unsupported)  Standing: Impaired; With support  Standing - Static: Good  Standing - Dynamic : Fair (+)      Posture:   Posture (WDL): Within defined limits     Ambulation/Gait Training:  Distance (ft): 300 Feet (ft)  Assistive Device: Walker, rollator  Ambulation - Level of Assistance: Stand-by assistance    Gait Abnormalities: Decreased step clearance    Base of Support: Narrowed     Speed/Na: Slow;Shuffled  Step Length: Right shortened;Left shortened     Stairs:  Number of Stairs Trained: 3  Stairs - Level of Assistance: Stand-by assistance  Rail Use: Right         Pain:  Pain level pre-treatment: 4/10  Pain level post-treatment: 4/10   Pain Intervention(s): Rest, Repositioning  Response to intervention: Nurse notified    Activity Tolerance:   Fair tolerance     Please refer to the flowsheet for vital signs taken during this treatment. After treatment:   [x] Patient left in no apparent distress sitting up in chair  [] Patient left in no apparent distress in bed  [x] Call bell left within reach  [x] Nursing notified  [] Caregiver present  [] Bed alarm activated  [] SCDs applied      COMMUNICATION/EDUCATION:   [x]         Role of Physical Therapy in the acute care setting. [x]         Fall prevention education was provided and the patient/caregiver indicated understanding. [x]         Patient/family have participated as able in working toward goals and plan of care. [x]         Patient/family agree to work toward stated goals and plan of care. []         Patient understands intent and goals of therapy, but is neutral about his/her participation.   []         Patient is unable to participate in stated goals/plan of care: ongoing with therapy staff.  []         Other:        Sneha Joyner, PT   Time Calculation: 31 mins    [facility] AM-PAC® Basic Mobility Inpatient Short Form (6-Clicks) Version 2    How much HELP from another person does the patient currently need    (If the patient hasn't done an activity recently, how much help from another person do you think he/she would need if he/she tried?)   Total (Total A or Dep)   A Lot  (Mod to Max A)   A Little (Sup or Min A)   None (Mod I to I)   Turning from your back to your side while in a flat bed without using bedrails? [] 1 [] 2 [x] 3 [] 4   2. Moving from lying on your back to sitting on the side of a flat bed without using bedrails? [] 1 [] 2 [x] 3 [] 4   3. Moving to and from a bed to a chair (including a wheelchair)? [] 1 [] 2 [x] 3 [] 4   4. Standing up from a chair using your arms (e.g., wheelchair, or bedside chair)? [] 1 [] 2 [x] 3 [] 4   5. Walking in hospital room? [] 1 [] 2 [x] 3 [] 4   6. Climbing 3-5 steps with a railing?+   [] 1 [] 2 [x] 3 [] 4   +If stair climbing cannot be assessed, skip item #6. Sum responses from items 1-5.

## 2022-07-20 NOTE — PROGRESS NOTES
Problem: Pressure Injury - Risk of  Goal: *Prevention of pressure injury  Description: Document Vargas Scale and appropriate interventions in the flowsheet. Outcome: Progressing Towards Goal  Note: Pressure Injury Interventions:  Sensory Interventions: Assess changes in LOC    Moisture Interventions: Absorbent underpads, Minimize layers, Maintain skin hydration (lotion/cream)    Activity Interventions: Assess need for specialty bed, Chair cushion, Increase time out of bed, Pressure redistribution bed/mattress(bed type), PT/OT evaluation    Mobility Interventions: Assess need for specialty bed, Chair cushion, Float heels, Pressure redistribution bed/mattress (bed type), Turn and reposition approx. every two hours(pillow and wedges)    Nutrition Interventions: Document food/fluid/supplement intake    Friction and Shear Interventions: Apply protective barrier, creams and emollients, Foam dressings/transparent film/skin sealants, Minimize layers                Problem: Patient Education: Go to Patient Education Activity  Goal: Patient/Family Education  Outcome: Progressing Towards Goal     Problem: Falls - Risk of  Goal: *Absence of Falls  Description: Document Khadra Fall Risk and appropriate interventions in the flowsheet.   Outcome: Progressing Towards Goal  Note: Fall Risk Interventions:  Mobility Interventions: Bed/chair exit alarm, Assess mobility with egress test         Medication Interventions: Assess postural VS orthostatic hypotension, Bed/chair exit alarm    Elimination Interventions: Bed/chair exit alarm, Call light in reach    History of Falls Interventions: Bed/chair exit alarm         Problem: Patient Education: Go to Patient Education Activity  Goal: Patient/Family Education  Outcome: Progressing Towards Goal     Problem: Nutrition Deficit  Goal: *Optimize nutritional status  Outcome: Progressing Towards Goal     Problem: Patient Education: Go to Patient Education Activity  Goal: Patient/Family Education  Outcome: Progressing Towards Goal     Problem: CABG: Post-Op Day 2/Transfer Day  Goal: Off Pathway (Use only if patient is Off Pathway)  Outcome: Progressing Towards Goal  Goal: Activity/Safety  Outcome: Progressing Towards Goal  Goal: Consults, if ordered  Outcome: Progressing Towards Goal  Goal: Diagnostic Test/Procedures  Outcome: Progressing Towards Goal  Goal: Nutrition/Diet  Outcome: Progressing Towards Goal  Goal: Medications  Outcome: Progressing Towards Goal  Goal: Discharge Planning  Outcome: Progressing Towards Goal  Goal: Respiratory  Outcome: Progressing Towards Goal  Goal: Treatments/Interventions/Procedures  Outcome: Progressing Towards Goal  Goal: Psychosocial  Outcome: Progressing Towards Goal  Goal: *Hemodynamically stable without vasoactive medications  Outcome: Progressing Towards Goal  Goal: *Lungs clear or at baseline  Outcome: Progressing Towards Goal  Goal: *Optimal pain control at patient's stated goal  Outcome: Progressing Towards Goal  Goal: *Demonstrates progressive activity  Outcome: Progressing Towards Goal  Goal: *Tolerating diet  Outcome: Progressing Towards Goal     Problem: CABG: Post-Op Day 3  Goal: Off Pathway (Use only if patient is Off Pathway)  Outcome: Progressing Towards Goal  Goal: Activity/Safety  Outcome: Progressing Towards Goal  Goal: Consults, if ordered  Outcome: Progressing Towards Goal  Goal: Diagnostic Test/Procedures  Outcome: Progressing Towards Goal  Goal: Nutrition/Diet  Outcome: Progressing Towards Goal  Goal: Discharge Planning  Outcome: Progressing Towards Goal  Goal: Medications  Outcome: Progressing Towards Goal  Goal: Respiratory  Outcome: Progressing Towards Goal  Goal: Treatments/Interventions/Procedures  Outcome: Progressing Towards Goal  Goal: Psychosocial  Outcome: Progressing Towards Goal  Goal: *Hemodynamically stable  Outcome: Progressing Towards Goal  Goal: *Lungs clear or at baseline  Outcome: Progressing Towards Goal  Goal: *Optimal pain control at patient's stated goal  Outcome: Progressing Towards Goal  Goal: *Incisions without signs and symptoms of wound complication  Outcome: Progressing Towards Goal  Goal: *Demonstrates progressive activity  Outcome: Progressing Towards Goal  Goal: *Tolerating diet  Outcome: Progressing Towards Goal     Problem: CABG: Post-Op Day 4  Goal: Off Pathway (Use only if patient is Off Pathway)  Outcome: Progressing Towards Goal  Goal: Activity/Safety  Outcome: Progressing Towards Goal  Goal: Diagnostic Test/Procedures  Outcome: Progressing Towards Goal  Goal: Nutrition/Diet  Outcome: Progressing Towards Goal  Goal: Medications  Outcome: Progressing Towards Goal  Goal: Discharge Planning  Outcome: Progressing Towards Goal  Goal: Respiratory  Outcome: Progressing Towards Goal  Goal: Treatments/Interventions/Procedures  Outcome: Progressing Towards Goal  Goal: Psychosocial  Outcome: Progressing Towards Goal  Goal: *Hemodynamically stable  Outcome: Progressing Towards Goal  Goal: *Lungs clear or at baseline  Outcome: Progressing Towards Goal  Goal: *Optimal pain control at patient's stated goal  Outcome: Progressing Towards Goal  Goal: *Incisions without signs and symptoms of wound complication  Outcome: Progressing Towards Goal  Goal: *Demonstrates progressive activity  Outcome: Progressing Towards Goal  Goal: *Tolerating diet  Outcome: Progressing Towards Goal     Problem: CABG: Post-Op Day 5  Goal: Off Pathway (Use only if patient is Off Pathway)  Outcome: Progressing Towards Goal  Goal: Activity/Safety  Outcome: Progressing Towards Goal  Goal: Diagnostic Test/Procedures  Outcome: Progressing Towards Goal  Goal: Nutrition/Diet  Outcome: Progressing Towards Goal  Goal: Discharge Planning  Outcome: Progressing Towards Goal  Goal: Medications  Outcome: Progressing Towards Goal  Goal: Respiratory  Outcome: Progressing Towards Goal  Goal: Treatments/Interventions/Procedures  Outcome: Progressing Towards Goal  Goal: Psychosocial  Outcome: Progressing Towards Goal     Problem: CABG: Discharge Outcomes  Goal: *Hemodynamically stable  Outcome: Progressing Towards Goal  Goal: *Stable cardiac rhythm  Outcome: Progressing Towards Goal  Goal: *Lungs clear or at baseline  Outcome: Progressing Towards Goal  Goal: *Demonstrates ability to perform prescribed activity without shortness of breath or discomfort  Outcome: Progressing Towards Goal  Goal: *Verbalizes home exercise program, activity guidelines, cardiac precautions  Outcome: Progressing Towards Goal  Goal: *Optimal pain control at patient's stated goal  Outcome: Progressing Towards Goal  Goal: *Verbalizes understanding and describes prescribed diet  Outcome: Progressing Towards Goal  Goal: *Verbalizes name, dosage, time, side effects, and number of days to continue medications  Outcome: Progressing Towards Goal  Goal: *Weight  is stable  Outcome: Progressing Towards Goal  Goal: *No signs and symptoms of infection or wound complications  Outcome: Progressing Towards Goal  Goal: *Anxiety reduced or absent  Outcome: Progressing Towards Goal  Goal: *Verbalizes and demonstrates incision care  Outcome: Progressing Towards Goal  Goal: *Understands and describes signs and symptoms to report to providers(Stroke Metric)  Outcome: Progressing Towards Goal  Goal: *Describes follow-up/return visits to physicians  Outcome: Progressing Towards Goal  Goal: *Describes available resources and support systems  Outcome: Progressing Towards Goal  Goal: *Expresses feelings about diagnosis and surgery  Outcome: Progressing Towards Goal  Goal: *Influenza immunization  Outcome: Progressing Towards Goal  Goal: *Pneumococcal immunization  Outcome: Progressing Towards Goal

## 2022-07-20 NOTE — PROGRESS NOTES
Nutrition Note      Pt with fair meal intake; tolerating diet. Consuming nutrition supplements. BM 7/18. Nutrition goals are being met with meal and nutrition supplement intake. Noted possible plan for discharge to home tomorrow per Cardiothoracic Surgery note. Nutrition Recommendations/Plan:   Continue current nutrition interventions. Encourage/ monitor po intake of meals and supplements.          Electronically signed by Eliz Chen RD on 7/20/2022 at 6:00 PM    Contact: [phone]

## 2022-07-20 NOTE — ROUTINE PROCESS
0730 Bedside and Verbal shift change report given to Rossi Altamirano  (oncoming nurse) by DELBERT Sepulveda (offgoing nurse). Report included the following information SBAR, Kardex, MAR, and Recent Results. Detail Level: Detailed Quality 131: Pain Assessment And Follow-Up: Pain assessment documented as positive, follow-up plan not documented, documentation the patient is not eligible Quality 130: Documentation Of Current Medications In The Medical Record: Current Medications Documented

## 2022-07-20 NOTE — PROGRESS NOTES
[facility]          Mediastinal Chest tube removed without difficulty. Right pleural tube placed on water seal. No provocative air leak seen. CXR in AM and likely  clamping trial if stable. Possible tube removal and discharge home later tomorrow. Patient daughter to view discharge video today. Plan discussed with patient and RN.

## 2022-07-20 NOTE — PROGRESS NOTES
Problem: Ventilator Management  Goal: *Adequate oxygenation and ventilation  Outcome: Resolved/Met  Goal: *Patient maintains clear airway/free of aspiration  Outcome: Resolved/Met  Goal: *Absence of infection signs and symptoms  Outcome: Resolved/Met  Goal: *Normal spontaneous ventilation  Outcome: Resolved/Met     Problem: Patient Education: Go to Patient Education Activity  Goal: Patient/Family Education  Outcome: Resolved/Met

## 2022-07-20 NOTE — PROGRESS NOTES
07:15  Received pt up in recliner chair from Darron Walker RN. Pt. A&O x 4. Denies pain. Chest tubes x 2 to 1 atrium with -20 cm constant wall suction. No air leak noted. Wound Prevention Checklist    Patient: Ora Gaona (89 y.o. female)  Date: 7/20/2022  Diagnosis: NSTEMI (non-ST elevated myocardial infarction) ([facility] [address] [I21.4] S/P CABG x 3    Precautions: Fall, Sternal       []  Heel prevention boots placed on patient    []  Patient turned q2h during shift    []  Lift team ordered    []  Patient on [place] bed/Specialty bed    []  Each Wound is documented during shift (Stage, Color, drainage, odor, measurements, and dressings)    [x]  Dual skin checks done at bedside during shift report with JUMANA Stone RN   12:20  Mediastinal chest tube removed by Niall [address] [place] [address]. Atrium removed from suction   13:30  Bedside and Verbal shift change report given to Alejo Dubon RN (oncoming nurse) by Ariel Linda RN (offgoing nurse). Report included the following information SBAR, Procedure Summary, Intake/Output, MAR, Recent Results, and Cardiac Rhythm Sinus rhythm .

## 2022-07-20 NOTE — PROGRESS NOTES
Problem: Self Care Deficits Care Plan (Adult)  Goal: *Acute Goals and Plan of Care (Insert Text)  Description: Occupational Therapy Goals  Initiated 7/17/2022 within 7 day(s). 1.  Patient will perform grooming with supervision/set-up standing at the sink for >3 min with Good balance. 2.  Patient will perform upper body dressing with supervision/set-up. 3.  Patient will perform lower body dressing with supervision/set-up. 4.  Patient will perform toilet transfers with supervision/set-up. 5.  Patient will perform all aspects of toileting with supervision/set-up. 6.  Patient will recall 100% of sternal precautions and will follow them during ADLs w/o cues. 7.  Patient will utilize energy conservation techniques during functional activities w/o cues. Prior Level of Function: Pt reports being Mod Ind for ADLs and functional mobility. Pt will be staying with her daughter upon DC. Outcome: Progressing Towards Goal  OCCUPATIONAL THERAPY TREATMENT    Patient: Nathanael Garland (62 y.o. female)  Date: 7/20/2022  Diagnosis: NSTEMI (non-ST elevated myocardial infarction) ([facility] [address] [I21.4] S/P CABG x 3  Procedure(s) (LRB):  CORONARY ARTERY BYPASS GRAFT (CABG) TIMES THREE (N/A) 5 Days Post-Op  Precautions: Fall, Sternal    Chart, occupational therapy assessment, plan of care, and goals were reviewed. ASSESSMENT:  Pt is up in the chair, agreeable to participate in OT. Pt was able to recall 50% of the sternal precautions, educated on the remaining sternal precautions and how they relate to ADLs and functional mobility. Pt verbalized understanding, required Min VCs to recall and to follow throughout the session, especially for use of heart hugger. Pt educated on adaptive strategies for UB dressing while following his sternal precautions, pt required additional time and Min A to perform. Pt educated on and issued reacher for [facility] and for items retrieval for ADLs. Pt demos understanding of all education.   Progression toward goals:  [x]          Improving appropriately and progressing toward goals  []          Improving slowly and progressing toward goals  []          Not making progress toward goals and plan of care will be adjusted     PLAN:  Patient continues to benefit from skilled intervention to address the above impairments. Continue treatment per established plan of care. Discharge Recommendations:  Home Health with family support  Further Equipment Recommendations for Discharge:  shower chair    [facility]: Carmel Ladd scored a 20/24 on the AM-PAC ADL Inpatient form. Current research shows that an AM-PAC score of 18 or greater is associated with a discharge to the patient's home setting. Based on the patient's AM-PAC score and their current ADL deficits, it is recommended that the patient have 2-3 sessions per week of Occupational Therapy at d/c to increase the patient's independence. Please see assessment section for further patient specific details. SUBJECTIVE:   Patient stated Gladies Kocher was wondering how I'm supposed to put my shirt on    OBJECTIVE DATA SUMMARY:   Cognitive/Behavioral Status:  Neurologic State: Alert  Orientation Level: Oriented X4  Cognition: Follows commands  Safety/Judgement: Awareness of environment, Fall prevention, Home safety  Functional Mobility and Transfers for ADLs:   Bed Mobility:  Supine to Sit:  (found up in chair)  Scooting: Minimum assistance (in chair)   Transfers:  Sit to Stand: Stand-by assistance  Stand to Sit: Contact guard assistance (tactile cues to use heart hugger)   Toilet Transfer : Stand-by assistance (with rollator)    Balance:  Sitting: Intact  Sitting - Static: Good (unsupported)  Sitting - Dynamic: Good (unsupported)  Standing: Impaired; With support  Standing - Static: Good  Standing - Dynamic : Fair (+)    ADL Intervention:   Grooming  Grooming Assistance: Supervision  Washing Face: Supervision  Washing Hands: Supervision  Upper Body Dressing Assistance  Orthotics(Brace): Minimum assistance  Pullover Shirt: Minimum assistance  Front Opened Shirt: Minimum assistance    Lower Body Dressing Assistance  Socks: Supervision  Leg Crossed Method Used: Yes    Toileting  Bladder Hygiene: Supervision  Bowel Hygiene: Supervision  Clothing Management: Stand-by assistance    Cognitive Retraining  Safety/Judgement: Awareness of environment; Fall prevention;Home safety    Pain:  Pain level pre-treatment: not rated  Pain level post-treatment: not rated    Activity Tolerance:    Fair  Please refer to the flowsheet for vital signs taken during this treatment. After treatment:   [x]  Patient left in no apparent distress sitting up in chair  []  Patient left in no apparent distress in bed  [x]  Call bell left within reach  [x]  Nursing notified  []  Caregiver present  []  Bed alarm activated    COMMUNICATION/EDUCATION:   [x] Role of Occupational Therapy in the acute care setting  [x] Home safety education was provided and the patient/caregiver indicated understanding. [x] Patient/family have participated as able in working towards goals and plan of care. [] Patient/family agree to work toward stated goals and plan of care. [] Patient understands intent and goals of therapy, but is neutral about his/her participation. [] Patient is unable to participate in goal setting and plan of care. Thank you for this referral.  Kian Snyder, OTR/L  Time Calculation: 24 mins    [address] [facility] [address] [address] AM-PAC® Daily Activity Inpatient Short Form (6-Clicks)*    How much HELP from another person does the patient currently need    (If the patient hasn't done an activity recently, how much help from another person do you think he/she would need if he/she tried?)   Total (Total A or Dep)   A Lot  (Mod to Max A)   A Little (Sup or Min A)   None (Mod I to I)   Putting on and taking off regular lower body clothing? [] 1 [] 2 [x] 3 [] 4   2. Bathing (including washing, rinsing,      drying)?     [] 1 [] 2 [x] 3 [] 4   3. Toileting, which includes using toilet, bedpan or urinal?   [] 1 [] 2 [x] 3 [] 4   4. Putting on and taking off regular upper body clothing? [] 1 [] 2 [x] 3 [] 4   5. Taking care of personal grooming such as brushing teeth? [] 1 [] 2 [] 3 [x] 4   6. Eating meals? [] 1 [] 2 [] 3 [x] 4      20/24  Current research shows that an AM-PAC score of 18 or greater is associated with a discharge to the patient's home setting. Based on the patient's AM-PAC score and their current ADL deficits, it is recommended that the patient have 2-3 sessions per week of Occupational Therapy at d/c to increase the patient's independence. Please see assessment section for further patient specific details.

## 2022-07-20 NOTE — PROGRESS NOTES
Cardiology Progress Note    Admit Date: 7/13/2022  Attending Cardiologist: Dr. Eliz Bo:       -CAD, s/p CABG x 3 (LIMA-LAD, SVG-diagonal, SVG-OM) by Dr. Josh Levine 7/15/2022  -NSTEMI. Given  mg and SL NTG x 1 at Urgent Care. Cardiac cath 7/13/2022 with findings as follows:  LM: Angiographically normal.  LAD: Mid LAD has diffuse up to 70-75% long disease. Distal vessel appeared approximately 2 mm in size without any significant obstructive disease. Diagonal branch with 99% ostial disease, bifurcating vessel, approximately 1.5-1.75 mm vessel. LCx: Ostial and proximal 90% stenosis of native LCx. High OM branch is 95-99% stenosis with faint forward flow. This vessel appears very small caliber. OM2 and OM3: Small caliber vessel with luminal irregularities. RCA: Proximal and mid diffuse disease. Distally severe disease. There is evidence of left to right collateral.  -Echo 0/69/5773: Ajspreet Sears LV systolic function with EF 55-60% with normal wall motion, normal LV size, normal wall thickness, normal diastolic function, no significant valvular disease.  -Tobacco abuse, approx. 20 pack-year smoking history     No primary cardiologist, initial referral completed by Dr. Rut Jolley:     -Continue routine post-operative care per cardiothoracic surgery team, appreciate assistance.  -Continue ASA, Lipitor.  -Will resume low-dose Lopressor with holding parameters.  -Increase mobility as tolerated. -Encourage incentive spirometry. Subjective:     No new complaints.     Objective:      Patient Vitals for the past 8 hrs:   Temp Pulse Resp BP SpO2   07/20/22 0800 98.2 °F (36.8 °C) 71 22 (!) 108/58 98 %   07/20/22 0700 -- 66 22 110/61 95 %   07/20/22 0600 -- 65 21 (!) 84/64 96 %   07/20/22 0500 -- 68 17 108/60 97 %   07/20/22 0400 98 °F (36.7 °C) 66 14 116/62 98 %   07/20/22 0300 -- 66 12 100/60 97 %   07/20/22 0200 -- 66 11 (!) 103/58 97 %         Patient Vitals for the past 96 hrs:   Weight 07/20/22 0400 55.8 kg (123 lb)   07/19/22 0400 55 kg (121 lb 4.1 oz)   07/17/22 0800 55.2 kg (121 lb 9.6 oz)                    Current Facility-Administered Medications   Medication Dose Route Frequency Last Admin    guaiFENesin ER (MUCINEX) tablet 600 mg  600 mg Oral Q12H 600 mg at 07/20/22 0843    albuterol-ipratropium (DUO-NEB) 2.5 MG-0.5 MG/3 ML  3 mL Nebulization Q6H PRN      insulin lispro (HUMALOG) injection   SubCUTAneous AC&HS 3 Units at 07/17/22 2139    glucose chewable tablet 16 g  4 Tablet Oral PRN      glucagon (GLUCAGEN) injection 1 mg  1 mg IntraMUSCular PRN      dextrose 10% infusion 0-250 mL  0-250 mL IntraVENous PRN      lidocaine (XYLOCAINE) 2 % viscous solution 15 mL  15 mL Mucous Membrane PRN      morphine injection 2-4 mg  2-4 mg IntraVENous Q2H PRN 2 mg at 07/16/22 1917    sodium chloride (NS) flush 5-40 mL  5-40 mL IntraVENous Q8H 10 mL at 07/20/22 0659    sodium chloride (NS) flush 5-40 mL  5-40 mL IntraVENous PRN      acetaminophen (TYLENOL) tablet 650 mg  650 mg Oral Q4H  mg at 07/19/22 0820    HYDROcodone-acetaminophen (NORCO) 5-325 mg per tablet 1-2 Tablet  1-2 Tablet Oral Q6H PRN 1 Tablet at 07/19/22 2250    naloxone (NARCAN) injection 0.4 mg  0.4 mg IntraVENous PRN      albuterol (PROVENTIL VENTOLIN) nebulizer solution 2.5 mg  2.5 mg Nebulization Q4H PRN      [Held by provider] metoprolol tartrate (LOPRESSOR) tablet 12.5 mg  12.5 mg Oral Q12H      ondansetron (ZOFRAN) injection 4 mg  4 mg IntraVENous Q4H PRN 4 mg at 07/16/22 0248    aspirin delayed-release tablet 81 mg  81 mg Oral DAILY 81 mg at 07/20/22 0843    chlorhexidine (PERIDEX) 0.12 % mouthwash 10 mL  10 mL Oral BID 10 mL at 07/20/22 0843    docusate sodium (COLACE) capsule 100 mg  100 mg Oral  mg at 07/20/22 0843    ELECTROLYTE REPLACEMENT PROTOCOL - Potassium Standard Dosing  1 Each Other PRN      ELECTROLYTE REPLACEMENT PROTOCOL - Magnesium  1 Each Other PRN      enoxaparin (LOVENOX) injection 40 mg  40 mg SubCUTAneous Q24H 40 mg at 07/19/22 1706    famotidine (PEPCID) tablet 20 mg  20 mg Oral BID 20 mg at 07/20/22 0843    sodium chloride (NS) flush 5-40 mL  5-40 mL IntraVENous PRN      nicotine (NICODERM CQ) 14 mg/24 hr patch 1 Patch  1 Patch TransDERmal DAILY 1 Patch at 07/20/22 0842    atorvastatin (LIPITOR) tablet 40 mg  40 mg Oral QHS 40 mg at 07/19/22 2207         Intake/Output Summary (Last 24 hours) at 7/20/2022 0934  Last data filed at 7/20/2022 0800  Gross per 24 hour   Intake 440 ml   Output 1306 ml   Net -866 ml       Physical Exam:  General:  alert, cooperative, no distress, appears stated age  Neck:  supple  Lungs:  clear to auscultation bilaterally  Heart:  regular rate and rhythm  Abdomen:  abdomen is soft without significant tenderness, masses, organomegaly or guarding  Extremities:  atraumatic, no edema    Visit Vitals  BP (!) 108/58 (BP 1 Location: Left upper arm, BP Patient Position: Sitting)   Pulse 71   Temp 98.2 °F (36.8 °C)   Resp 22   Ht 5' 2\" (1.575 m)   Wt 55.8 kg (123 lb)   SpO2 98%   BMI 22.50 kg/m²       Data Review:     Labs: Results:       Chemistry Recent Labs     07/20/22 0215 07/18/22 0514   GLU 88 104*   * 138   K 3.7 4.1    105   CO2 30 29   BUN 29* 39*   CREA 0.71 0.86   CA 8.0* 8.1*   MG 2.1 2.5   AGAP 2* 4   BUCR 41* 45*      CBC w/Diff Recent Labs     07/20/22 0215 07/18/22  0514   WBC 7.8 10.4   RBC 2.53* 2.75*   HGB 7.8* 8.5*   HCT 24.1* 25.7*   * 96*   GRANS 65 74*   LYMPH 21 17*   EOS 1 0      Lipid Panel Lab Results   Component Value Date/Time    Cholesterol, total 127 07/14/2022 05:39 AM    HDL Cholesterol 43 07/14/2022 05:39 AM    LDL, calculated 73.8 07/14/2022 05:39 AM    VLDL, calculated 10.2 07/14/2022 05:39 AM    Triglyceride 51 07/14/2022 05:39 AM    CHOL/HDL Ratio 3.0 07/14/2022 05:39 AM      Thyroid Studies Lab Results   Component Value Date/Time    TSH 0.68 07/13/2022 04:52 PM          Signed By: Tyra Dunbar PA-C     July 20, 2022

## 2022-07-20 NOTE — ROUTINE PROCESS
1040: Helping to assist with care on pt provided PRN pain med for Rt. Upper axillary/arm pain after walking. Pt rated pain 5/10 on pain scale per pt \"It's just really sore\". Pt tolerated pain med well no adverse reaction noted.

## 2022-07-21 ENCOUNTER — APPOINTMENT (OUTPATIENT)
Dept: GENERAL RADIOLOGY | Age: 62
DRG: 234 | End: 2022-07-21
Attending: PHYSICIAN ASSISTANT
Payer: COMMERCIAL

## 2022-07-21 LAB
ANION GAP SERPL CALC-SCNC: 5 MMOL/L (ref 3–18)
BASOPHILS # BLD: 0 K/UL (ref 0–0.1)
BASOPHILS NFR BLD: 0 % (ref 0–2)
BUN SERPL-MCNC: 18 MG/DL (ref 7–18)
BUN/CREAT SERPL: 27 (ref 12–20)
CALCIUM SERPL-MCNC: 8.2 MG/DL (ref 8.5–10.1)
CHLORIDE SERPL-SCNC: 103 MMOL/L (ref 100–111)
CO2 SERPL-SCNC: 29 MMOL/L (ref 21–32)
CREAT SERPL-MCNC: 0.67 MG/DL (ref 0.6–1.3)
DIFFERENTIAL METHOD BLD: ABNORMAL
EOSINOPHIL # BLD: 0.2 K/UL (ref 0–0.4)
EOSINOPHIL NFR BLD: 2 % (ref 0–5)
ERYTHROCYTE [DISTWIDTH] IN BLOOD BY AUTOMATED COUNT: 14.1 % (ref 11.6–14.5)
GLUCOSE BLD STRIP.AUTO-MCNC: 126 MG/DL (ref 70–110)
GLUCOSE BLD STRIP.AUTO-MCNC: 145 MG/DL (ref 70–110)
GLUCOSE BLD STRIP.AUTO-MCNC: 158 MG/DL (ref 70–110)
GLUCOSE BLD STRIP.AUTO-MCNC: 173 MG/DL (ref 70–110)
GLUCOSE BLD STRIP.AUTO-MCNC: 96 MG/DL (ref 70–110)
GLUCOSE SERPL-MCNC: 99 MG/DL (ref 74–99)
HCT VFR BLD AUTO: 24.2 % (ref 35–45)
HGB BLD-MCNC: 7.8 G/DL (ref 12–16)
IMM GRANULOCYTES # BLD AUTO: 0.1 K/UL (ref 0–0.04)
IMM GRANULOCYTES NFR BLD AUTO: 1 % (ref 0–0.5)
LYMPHOCYTES # BLD: 1.3 K/UL (ref 0.9–3.6)
LYMPHOCYTES NFR BLD: 17 % (ref 21–52)
MAGNESIUM SERPL-MCNC: 1.9 MG/DL (ref 1.6–2.6)
MCH RBC QN AUTO: 30.8 PG (ref 24–34)
MCHC RBC AUTO-ENTMCNC: 32.2 G/DL (ref 31–37)
MCV RBC AUTO: 95.7 FL (ref 78–100)
MONOCYTES # BLD: 1.1 K/UL (ref 0.05–1.2)
MONOCYTES NFR BLD: 15 % (ref 3–10)
NEUTS SEG # BLD: 4.8 K/UL (ref 1.8–8)
NEUTS SEG NFR BLD: 65 % (ref 40–73)
NRBC # BLD: 0.11 K/UL (ref 0–0.01)
NRBC BLD-RTO: 1.5 PER 100 WBC
PLATELET # BLD AUTO: 156 K/UL (ref 135–420)
PMV BLD AUTO: 11.6 FL (ref 9.2–11.8)
POTASSIUM SERPL-SCNC: 3.9 MMOL/L (ref 3.5–5.5)
RBC # BLD AUTO: 2.53 M/UL (ref 4.2–5.3)
SODIUM SERPL-SCNC: 137 MMOL/L (ref 136–145)
WBC # BLD AUTO: 7.4 K/UL (ref 4.6–13.2)

## 2022-07-21 PROCEDURE — 74011250637 HC RX REV CODE- 250/637: Performed by: INTERNAL MEDICINE

## 2022-07-21 PROCEDURE — 97116 GAIT TRAINING THERAPY: CPT

## 2022-07-21 PROCEDURE — 71045 X-RAY EXAM CHEST 1 VIEW: CPT

## 2022-07-21 PROCEDURE — 82962 GLUCOSE BLOOD TEST: CPT

## 2022-07-21 PROCEDURE — 99024 POSTOP FOLLOW-UP VISIT: CPT | Performed by: PHYSICIAN ASSISTANT

## 2022-07-21 PROCEDURE — 65660000004 HC RM CVT STEPDOWN

## 2022-07-21 PROCEDURE — 74011250637 HC RX REV CODE- 250/637: Performed by: PHYSICIAN ASSISTANT

## 2022-07-21 PROCEDURE — 83735 ASSAY OF MAGNESIUM: CPT

## 2022-07-21 PROCEDURE — 85025 COMPLETE CBC W/AUTO DIFF WBC: CPT

## 2022-07-21 PROCEDURE — APPNB60 APP NON BILLABLE TIME 46-60 MINS: Performed by: PHYSICIAN ASSISTANT

## 2022-07-21 PROCEDURE — 74011000250 HC RX REV CODE- 250: Performed by: PHYSICIAN ASSISTANT

## 2022-07-21 PROCEDURE — 97535 SELF CARE MNGMENT TRAINING: CPT

## 2022-07-21 PROCEDURE — 74011636637 HC RX REV CODE- 636/637: Performed by: PHYSICIAN ASSISTANT

## 2022-07-21 PROCEDURE — 80048 BASIC METABOLIC PNL TOTAL CA: CPT

## 2022-07-21 PROCEDURE — 74011250636 HC RX REV CODE- 250/636: Performed by: PHYSICIAN ASSISTANT

## 2022-07-21 PROCEDURE — 99232 SBSQ HOSP IP/OBS MODERATE 35: CPT | Performed by: INTERNAL MEDICINE

## 2022-07-21 PROCEDURE — 97530 THERAPEUTIC ACTIVITIES: CPT

## 2022-07-21 RX ADMIN — ENOXAPARIN SODIUM 40 MG: 100 INJECTION SUBCUTANEOUS at 17:15

## 2022-07-21 RX ADMIN — HYDROCODONE BITARTRATE AND ACETAMINOPHEN 1 TABLET: 5; 325 TABLET ORAL at 23:28

## 2022-07-21 RX ADMIN — ATORVASTATIN CALCIUM 40 MG: 40 TABLET, FILM COATED ORAL at 22:20

## 2022-07-21 RX ADMIN — CLOPIDOGREL BISULFATE 75 MG: 75 TABLET ORAL at 08:35

## 2022-07-21 RX ADMIN — GUAIFENESIN 600 MG: 600 TABLET ORAL at 08:35

## 2022-07-21 RX ADMIN — HYDROCODONE BITARTRATE AND ACETAMINOPHEN 1 TABLET: 5; 325 TABLET ORAL at 15:08

## 2022-07-21 RX ADMIN — DOCUSATE SODIUM 100 MG: 100 CAPSULE, LIQUID FILLED ORAL at 08:35

## 2022-07-21 RX ADMIN — ASPIRIN 81 MG: 81 TABLET, COATED ORAL at 08:35

## 2022-07-21 RX ADMIN — CHLORHEXIDINE GLUCONATE 0.12% ORAL RINSE 10 ML: 1.2 LIQUID ORAL at 17:15

## 2022-07-21 RX ADMIN — GUAIFENESIN 600 MG: 600 TABLET ORAL at 22:20

## 2022-07-21 RX ADMIN — FAMOTIDINE 20 MG: 20 TABLET ORAL at 08:35

## 2022-07-21 RX ADMIN — Medication 3 UNITS: at 11:47

## 2022-07-21 RX ADMIN — SODIUM CHLORIDE, PRESERVATIVE FREE 10 ML: 5 INJECTION INTRAVENOUS at 05:19

## 2022-07-21 RX ADMIN — CHLORHEXIDINE GLUCONATE 0.12% ORAL RINSE 10 ML: 1.2 LIQUID ORAL at 08:34

## 2022-07-21 RX ADMIN — SODIUM CHLORIDE, PRESERVATIVE FREE 10 ML: 5 INJECTION INTRAVENOUS at 22:20

## 2022-07-21 RX ADMIN — FAMOTIDINE 20 MG: 20 TABLET ORAL at 17:15

## 2022-07-21 RX ADMIN — DOCUSATE SODIUM 100 MG: 100 CAPSULE, LIQUID FILLED ORAL at 17:15

## 2022-07-21 RX ADMIN — SODIUM CHLORIDE, PRESERVATIVE FREE 10 ML: 5 INJECTION INTRAVENOUS at 14:00

## 2022-07-21 RX ADMIN — HYDROCODONE BITARTRATE AND ACETAMINOPHEN 1 TABLET: 5; 325 TABLET ORAL at 08:35

## 2022-07-21 NOTE — ROUTINE PROCESS
Discontinued cordis introducer while in trendelenberg and during valsalva maneuver. maintained hemostasis and applied occlusive dressing.

## 2022-07-21 NOTE — PROGRESS NOTES
Problem: Mobility Impaired (Adult and Pediatric)  Goal: *Acute Goals and Plan of Care (Insert Text)  Description: Physical Therapy Goals  Initiated 7/17/2022 and to be accomplished within 7 day(s)  1. Patient will move from supine to sit and sit to supine , scoot up and down, and roll side to side in bed with supervision/set-up. 2.  Patient will transfer from bed to chair and chair to bed with supervision/set-up using the least restrictive device. 3.  Patient will perform sit to stand with supervision/set-up. 4.  Patient will ambulate with supervision/set-up for 300 feet with the least restrictive device. 5.  Patient will ascend/descend 12 stairs with unilateral handrail(s) with supervision/set-up. PLOF: Pt reporting she lives in 2 \[facility]", independent PTA, will be going to live with her daughter at discharge. Reporting her daughter lives in 2nd floor apartment. Outcome: Progressing Towards Goal     PHYSICAL THERAPY TREATMENT    Patient: Cheo Ruvalcaba (68 y.o. female)  Date: 7/21/2022  Diagnosis: NSTEMI (non-ST elevated myocardial infarction) ([facility] [address] [I21.4] S/P CABG x 3  Procedure(s) (LRB):  CORONARY ARTERY BYPASS GRAFT (CABG) TIMES THREE (N/A) 6 Days Post-Op    Precautions: Fall, Sternal    ASSESSMENT:  Patient agreeable to PT treatment. SBA for functional transfers. Appropriate use of heart hugger. She ambulates 150 ft at very slow pace using rollator. Steady gait. She negotiates 4 steps using right hand rail and SBA. Pt c/o fatigue and request to return to chair. Pt has all needs within reach and resting in recliner at end of session. Progression toward goals:   []      Improving appropriately and progressing toward goals  [x]      Improving slowly and progressing toward goals  []      Not making progress toward goals and plan of care will be adjusted     PLAN:  Patient continues to benefit from skilled intervention to address the above impairments.   Continue treatment per established plan of care. Discharge Recommendations:  Home Health  Further Equipment Recommendations for Discharge:  rolling walker and N/A    [facility]: Pradeep Ramirez scored a 18/24 on the AM-PAC short mobility form. Current research shows that an AM-PAC score of 18 (14 if omitting stairs) or greater is typically associated with a discharge to the patient's home setting. Based on the patient's AM-PAC score and their current functional mobility deficits, it is recommended that the patient have 2-3 sessions per week of Physical Therapy at d/c to increase the patient's independence. At this time, this patient demonstrates the endurance and safety to discharge home with PT and a follow up treatment frequency of 2-3x/wk. Please see assessment section for further patient specific details. SUBJECTIVE:   Patient stated Hopefully they can remove [chest tube] it tomorrow.     OBJECTIVE DATA SUMMARY:   Critical Behavior:  Neurologic State: Alert  Orientation Level: Oriented X4  Cognition: Appropriate decision making, Appropriate for age attention/concentration, Appropriate safety awareness  Safety/Judgement: Awareness of environment, Fall prevention, Home safety  Functional Mobility Training:    Transfers:  Sit to Stand: Stand-by assistance  Stand to Sit: Stand-by assistance                Balance:  Sitting: Intact  Standing: Impaired; With support  Standing - Static: Good  Standing - Dynamic : Fair (+)         Ambulation/Gait Training:  Distance (ft): 150 Feet (ft)  Assistive Device: Walker, rollator  Ambulation - Level of Assistance: Stand-by assistance  Gait Abnormalities: Decreased step clearance  Speed/Na: Slow;Pace decreased (<100 feet/min)                      Stairs:  Number of Stairs Trained: 4  Stairs - Level of Assistance: Stand-by assistance  Rail Use: Right     Pain:  Pain level pre-treatment: 4/10  Pain level post-treatment: 4/10   Pain Intervention(s): Medication (see MAR);  Rest, Ice, Repositioning   Response to intervention: Nurse notified, See doc flow    Activity Tolerance:   FAir  Please refer to the flowsheet for vital signs taken during this treatment. After treatment:   [x] Patient left in no apparent distress sitting up in chair  [] Patient left in no apparent distress in bed  [x] Call bell left within reach  [x] Nursing notified  [] Caregiver present  [] Bed alarm activated  [] SCDs applied      COMMUNICATION/EDUCATION:   []         Role of Physical Therapy in the acute care setting. [x]         Fall prevention education was provided and the patient/caregiver indicated understanding. [x]         Patient/family have participated as able in working toward goals and plan of care. []         Patient/family agree to work toward stated goals and plan of care. []         Patient understands intent and goals of therapy, but is neutral about his/her participation. []         Patient is unable to participate in stated goals/plan of care: ongoing with therapy staff.  []         Other:        Jacquie Harry, PT   Time Calculation: 23 mins    [address] [facility] [address] [address] AM-PAC® Basic Mobility Inpatient Short Form (6-Clicks) Version 2    How much HELP from another person does the patient currently need    (If the patient hasn't done an activity recently, how much help from another person do you think he/she would need if he/she tried?)   Total (Total A or Dep)   A Lot  (Mod to Max A)   A Little (Sup or Min A)   None (Mod I to I)   Turning from your back to your side while in a flat bed without using bedrails? [] 1 [] 2 [x] 3 [] 4   2. Moving from lying on your back to sitting on the side of a flat bed without using bedrails? [] 1 [] 2 [x] 3 [] 4   3. Moving to and from a bed to a chair (including a wheelchair)? [] 1 [] 2 [x] 3 [] 4   4. Standing up from a chair using your arms (e.g., wheelchair, or bedside chair)? [] 1 [] 2 [x] 3 [] 4   5. Walking in hospital room? [] 1 [] 2 [x] 3 [] 4   6.  Climbing 3-5 steps with a railing?+   [] 1 [] 2 [x] 3 [] 4   +If stair climbing cannot be assessed, skip item #6. Sum responses from items 1-5.

## 2022-07-21 NOTE — PROGRESS NOTES
Problem: Pressure Injury - Risk of  Goal: *Prevention of pressure injury  Description: Document Vargas Scale and appropriate interventions in the flowsheet. 7/21/2022 0731 by Greg Bailey RN  Outcome: Progressing Towards Goal  Note: Pressure Injury Interventions:  Sensory Interventions: Assess changes in LOC, Assess need for specialty bed, Avoid rigorous massage over bony prominences    Moisture Interventions: Absorbent underpads, Apply protective barrier, creams and emollients, Assess need for specialty bed    Activity Interventions: Increase time out of bed, Pressure redistribution bed/mattress(bed type)    Mobility Interventions: Assess need for specialty bed, Chair cushion, Float heels, HOB 30 degrees or less    Nutrition Interventions: Document food/fluid/supplement intake    Friction and Shear Interventions: Apply protective barrier, creams and emollients, Foam dressings/transparent film/skin sealants, Minimize layers             7/20/2022 1843 by Greg Bailey RN  Outcome: Progressing Towards Goal  Note: Pressure Injury Interventions:  Sensory Interventions: Assess changes in LOC    Moisture Interventions: Absorbent underpads, Minimize layers, Maintain skin hydration (lotion/cream)    Activity Interventions: Assess need for specialty bed, Chair cushion, Increase time out of bed, Pressure redistribution bed/mattress(bed type), PT/OT evaluation    Mobility Interventions: Assess need for specialty bed, Chair cushion, Float heels, Pressure redistribution bed/mattress (bed type), Turn and reposition approx.  every two hours(pillow and wedges)    Nutrition Interventions: Document food/fluid/supplement intake    Friction and Shear Interventions: Apply protective barrier, creams and emollients, Foam dressings/transparent film/skin sealants, Minimize layers                Problem: Patient Education: Go to Patient Education Activity  Goal: Patient/Family Education  7/21/2022 0731 by Greg Bailey RN  Outcome: Progressing Towards Goal  7/20/2022 1843 by Marj Mckenzie RN  Outcome: Progressing Towards Goal     Problem: Falls - Risk of  Goal: *Absence of Falls  Description: Document Leafy Rapp Fall Risk and appropriate interventions in the flowsheet.   7/21/2022 0731 by Marj Mckenzie RN  Outcome: Progressing Towards Goal  Note: Fall Risk Interventions:  Mobility Interventions: Assess mobility with egress test, Bed/chair exit alarm, Communicate number of staff needed for ambulation/transfer, Patient to call before getting OOB         Medication Interventions: Assess postural VS orthostatic hypotension, Bed/chair exit alarm, Evaluate medications/consider consulting pharmacy    Elimination Interventions: Bed/chair exit alarm, Call light in reach, Patient to call for help with toileting needs    History of Falls Interventions: Door open when patient unattended, Evaluate medications/consider consulting pharmacy      7/20/2022 1843 by Marj Mckenzie RN  Outcome: Progressing Towards Goal  Note: Fall Risk Interventions:  Mobility Interventions: Bed/chair exit alarm, Assess mobility with egress test         Medication Interventions: Assess postural VS orthostatic hypotension, Bed/chair exit alarm    Elimination Interventions: Bed/chair exit alarm, Call light in reach    History of Falls Interventions: Bed/chair exit alarm         Problem: Patient Education: Go to Patient Education Activity  Goal: Patient/Family Education  7/21/2022 0731 by Marj Mckenzie RN  Outcome: Progressing Towards Goal  7/20/2022 1843 by Marj Mckenzie RN  Outcome: Progressing Towards Goal     Problem: Nutrition Deficit  Goal: *Optimize nutritional status  7/21/2022 0731 by Marj Mckenzie RN  Outcome: Progressing Towards Goal  7/20/2022 1843 by Marj Mckenzie RN  Outcome: Progressing Towards Goal     Problem: Patient Education: Go to Patient Education Activity  Goal: Patient/Family Education  7/21/2022 0731 by Marj Mceknzie RN  Outcome: Progressing Towards Goal  7/20/2022 1843 by Des Fanmarilu, RN  Outcome: Progressing Towards Goal     Problem: CABG: Post-Op Day 2/Transfer Day  Goal: Off Pathway (Use only if patient is Off Pathway)  7/21/2022 0731 by Des Fanmarilu, RN  Outcome: Progressing Towards Goal  7/20/2022 1843 by Des Franklin, RN  Outcome: Progressing Towards Goal  Goal: Activity/Safety  7/21/2022 0731 by Des Fanmarilu, RN  Outcome: Progressing Towards Goal  7/20/2022 1843 by Des Fanmarilu, RN  Outcome: Progressing Towards Goal  Goal: Consults, if ordered  7/21/2022 0731 by Des Fanmarilu, RN  Outcome: Progressing Towards Goal  7/20/2022 1843 by Des Franklin, RN  Outcome: Progressing Towards Goal  Goal: Diagnostic Test/Procedures  7/21/2022 0731 by Des Franklin, RN  Outcome: Progressing Towards Goal  7/20/2022 1843 by Des Franklin, RN  Outcome: Progressing Towards Goal  Goal: Nutrition/Diet  7/21/2022 0731 by Des Fanmarilu, RN  Outcome: Progressing Towards Goal  7/20/2022 1843 by Des Franklin, RN  Outcome: Progressing Towards Goal  Goal: Medications  7/21/2022 0731 by Des Franklin, RN  Outcome: Progressing Towards Goal  7/20/2022 1843 by Des Fanmarilu, RN  Outcome: Progressing Towards Goal  Goal: Discharge Planning  7/21/2022 0731 by Des Franklin, RN  Outcome: Progressing Towards Goal  7/20/2022 1843 by Des Franklin, RN  Outcome: Progressing Towards Goal  Goal: Respiratory  7/21/2022 0731 by Des Fanmarilu, RN  Outcome: Progressing Towards Goal  7/20/2022 1843 by Des Fanmarilu, RN  Outcome: Progressing Towards Goal  Goal: Treatments/Interventions/Procedures  7/21/2022 0731 by Des Fanmarilu, RN  Outcome: Progressing Towards Goal  7/20/2022 1843 by Des Fanmarilu, RN  Outcome: Progressing Towards Goal  Goal: Psychosocial  7/21/2022 0731 by Des Fanmarilu, RN  Outcome: Progressing Towards Goal  7/20/2022 1843 by Des Franklin, RN  Outcome: Progressing Towards Goal  Goal: *Hemodynamically stable without vasoactive medications  7/21/2022 0731 by William Almonte RN  Outcome: Progressing Towards Goal  7/20/2022 1843 by William Almonte RN  Outcome: Progressing Towards Goal  Goal: *Lungs clear or at baseline  7/21/2022 0731 by William Almonte RN  Outcome: Progressing Towards Goal  7/20/2022 1843 by William Almonte RN  Outcome: Progressing Towards Goal  Goal: *Optimal pain control at patient's stated goal  7/21/2022 0731 by William Almonte RN  Outcome: Progressing Towards Goal  7/20/2022 1843 by William Almonte RN  Outcome: Progressing Towards Goal  Goal: *Demonstrates progressive activity  7/21/2022 0731 by William Almonte RN  Outcome: Progressing Towards Goal  7/20/2022 1843 by William Almonte RN  Outcome: Progressing Towards Goal  Goal: *Tolerating diet  7/21/2022 0731 by William Almonte RN  Outcome: Progressing Towards Goal  7/20/2022 1843 by William Almonte RN  Outcome: Progressing Towards Goal     Problem: CABG: Post-Op Day 3  Goal: Off Pathway (Use only if patient is Off Pathway)  7/21/2022 0731 by William Almonte RN  Outcome: Progressing Towards Goal  7/20/2022 1843 by William Almonte RN  Outcome: Progressing Towards Goal  Goal: Activity/Safety  7/21/2022 0731 by William Almonte RN  Outcome: Progressing Towards Goal  7/20/2022 1843 by William Almonte RN  Outcome: Progressing Towards Goal  Goal: Consults, if ordered  7/21/2022 0731 by William Almonte RN  Outcome: Progressing Towards Goal  7/20/2022 1843 by William Almonte RN  Outcome: Progressing Towards Goal  Goal: Diagnostic Test/Procedures  7/21/2022 0731 by William Almonte RN  Outcome: Progressing Towards Goal  7/20/2022 1843 by William Almonte RN  Outcome: Progressing Towards Goal  Goal: Nutrition/Diet  7/21/2022 0731 by William Almonte RN  Outcome: Progressing Towards Goal  7/20/2022 1843 by William Almonte, RN  Outcome: Progressing Towards Goal  Goal: Discharge Planning  7/21/2022 0731 by Chanel Mcmillan RN  Outcome: Progressing Towards Goal  7/20/2022 1843 by Chanel Mcmillan RN  Outcome: Progressing Towards Goal  Goal: Medications  7/21/2022 0731 by Chanel Mcmillan RN  Outcome: Progressing Towards Goal  7/20/2022 1843 by Chanel Mcmillan RN  Outcome: Progressing Towards Goal  Goal: Respiratory  7/21/2022 0731 by Chanel Mcmillan, RN  Outcome: Progressing Towards Goal  7/20/2022 1843 by Chanel Mcmillan, RN  Outcome: Progressing Towards Goal  Goal: Treatments/Interventions/Procedures  7/21/2022 0731 by Chanel Mcmillan RN  Outcome: Progressing Towards Goal  7/20/2022 1843 by Chanel Mcmillan, RN  Outcome: Progressing Towards Goal  Goal: Psychosocial  7/21/2022 0731 by Chanel Mcmillan RN  Outcome: Progressing Towards Goal  7/20/2022 1843 by Chanel Mcmillan RN  Outcome: Progressing Towards Goal  Goal: *Hemodynamically stable  7/21/2022 0731 by Chanel Mcmillan, RN  Outcome: Progressing Towards Goal  7/20/2022 1843 by Chanel Mcmillan RN  Outcome: Progressing Towards Goal  Goal: *Lungs clear or at baseline  7/21/2022 0731 by Chanel Mcmillan, RN  Outcome: Progressing Towards Goal  7/20/2022 1843 by Chanel Mcmillan RN  Outcome: Progressing Towards Goal  Goal: *Optimal pain control at patient's stated goal  7/21/2022 0731 by Chanel Mcmillan, RN  Outcome: Progressing Towards Goal  7/20/2022 1843 by Chanel Mcmillan RN  Outcome: Progressing Towards Goal  Goal: *Incisions without signs and symptoms of wound complication  7/29/8958 5799 by Chanel Mcmillan, RN  Outcome: Progressing Towards Goal  7/20/2022 1843 by Chanel Mcmillan RN  Outcome: Progressing Towards Goal  Goal: *Demonstrates progressive activity  7/21/2022 0731 by Chanel Mcmillan, RN  Outcome: Progressing Towards Goal  7/20/2022 1843 by Chanel Mcmillan RN  Outcome: Progressing Towards Goal  Goal: *Tolerating diet  7/21/2022 0731 by Chanel Mcmillan RN  Outcome: Progressing Towards Goal  7/20/2022 1843 by Poly Lewis RN  Outcome: Progressing Towards Goal     Problem: CABG: Post-Op Day 4  Goal: Off Pathway (Use only if patient is Off Pathway)  7/21/2022 0731 by Poly Lewis RN  Outcome: Progressing Towards Goal  7/20/2022 1843 by Poly Lewis RN  Outcome: Progressing Towards Goal  Goal: Activity/Safety  7/21/2022 0731 by Poly Lewis RN  Outcome: Progressing Towards Goal  7/20/2022 1843 by Poly Lewis RN  Outcome: Progressing Towards Goal  Goal: Diagnostic Test/Procedures  7/21/2022 0731 by Poly Lewis RN  Outcome: Progressing Towards Goal  7/20/2022 1843 by Poly Lewis RN  Outcome: Progressing Towards Goal  Goal: Nutrition/Diet  7/21/2022 0731 by Poly Lewis RN  Outcome: Progressing Towards Goal  7/20/2022 1843 by Poly Lewis RN  Outcome: Progressing Towards Goal  Goal: Medications  7/21/2022 0731 by Poly Lewis RN  Outcome: Progressing Towards Goal  7/20/2022 1843 by Poly Lewis RN  Outcome: Progressing Towards Goal  Goal: Discharge Planning  7/21/2022 0731 by Poly Lewis RN  Outcome: Progressing Towards Goal  7/20/2022 1843 by Poly Lewis RN  Outcome: Progressing Towards Goal  Goal: Respiratory  7/21/2022 0731 by Poly Lewis RN  Outcome: Progressing Towards Goal  7/20/2022 1843 by Poly Lewis RN  Outcome: Progressing Towards Goal  Goal: Treatments/Interventions/Procedures  7/21/2022 0731 by Poly Lewis RN  Outcome: Progressing Towards Goal  7/20/2022 1843 by Poly Lewis RN  Outcome: Progressing Towards Goal  Goal: Psychosocial  7/21/2022 0731 by Poly Lewis RN  Outcome: Progressing Towards Goal  7/20/2022 1843 by Poly Lewis RN  Outcome: Progressing Towards Goal  Goal: *Hemodynamically stable  7/21/2022 0731 by Poly Lewis RN  Outcome: Progressing Towards Goal  7/20/2022 1843 by Poly Lewis RN  Outcome: Progressing Towards Goal  Goal: *Lungs clear or at baseline  7/21/2022 0731 by Frankey Kay, RN  Outcome: Progressing Towards Goal  7/20/2022 1843 by Frankey Kay, RN  Outcome: Progressing Towards Goal  Goal: *Optimal pain control at patient's stated goal  7/21/2022 0731 by Frankey Kay, RN  Outcome: Progressing Towards Goal  7/20/2022 1843 by Frankey Kay, RN  Outcome: Progressing Towards Goal  Goal: *Incisions without signs and symptoms of wound complication  5/76/4227 7166 by Frankey Kay, RN  Outcome: Progressing Towards Goal  7/20/2022 1843 by Frankey Kay, RN  Outcome: Progressing Towards Goal  Goal: *Demonstrates progressive activity  7/21/2022 0731 by Frankey Kay, RN  Outcome: Progressing Towards Goal  7/20/2022 1843 by Frankey Kay, RN  Outcome: Progressing Towards Goal  Goal: *Tolerating diet  7/21/2022 0731 by Frankey Kay, RN  Outcome: Progressing Towards Goal  7/20/2022 1843 by Frankey Kay, RN  Outcome: Progressing Towards Goal     Problem: CABG: Post-Op Day 5  Goal: Off Pathway (Use only if patient is Off Pathway)  7/21/2022 0731 by Frankey Kay, RN  Outcome: Progressing Towards Goal  7/20/2022 1843 by Frankey Kay, RN  Outcome: Progressing Towards Goal  Goal: Activity/Safety  7/21/2022 0731 by Frankey Kay, RN  Outcome: Progressing Towards Goal  7/20/2022 1843 by Frankey Kay, RN  Outcome: Progressing Towards Goal  Goal: Diagnostic Test/Procedures  7/21/2022 0731 by Frankey Kay, RN  Outcome: Progressing Towards Goal  7/20/2022 1843 by Frankey Kay, RN  Outcome: Progressing Towards Goal  Goal: Nutrition/Diet  7/21/2022 0731 by Frankey Kay, RN  Outcome: Progressing Towards Goal  7/20/2022 1843 by Frankey Kay, RN  Outcome: Progressing Towards Goal  Goal: Discharge Planning  7/21/2022 0731 by Frankey Kay, RN  Outcome: Progressing Towards Goal  7/20/2022 1843 by Frankey Kay, RN  Outcome: Progressing Towards Goal  Goal: Medications  7/21/2022 0731 by Frankey Kay, RN  Outcome: Progressing Towards Goal  7/20/2022 1843 by William Almonte RN  Outcome: Progressing Towards Goal  Goal: Respiratory  7/21/2022 0731 by William Almonte RN  Outcome: Progressing Towards Goal  7/20/2022 1843 by William Almonte RN  Outcome: Progressing Towards Goal  Goal: Treatments/Interventions/Procedures  7/21/2022 0731 by William Almonte RN  Outcome: Progressing Towards Goal  7/20/2022 1843 by William Almonte RN  Outcome: Progressing Towards Goal  Goal: Psychosocial  7/21/2022 0731 by William Almonte RN  Outcome: Progressing Towards Goal  7/20/2022 1843 by William Almonte RN  Outcome: Progressing Towards Goal     Problem: CABG: Discharge Outcomes  Goal: *Hemodynamically stable  7/21/2022 0731 by William Almonte RN  Outcome: Progressing Towards Goal  7/20/2022 1843 by William Almonte RN  Outcome: Progressing Towards Goal  Goal: *Stable cardiac rhythm  7/21/2022 0731 by William Almonte RN  Outcome: Progressing Towards Goal  7/20/2022 1843 by William Almonte RN  Outcome: Progressing Towards Goal  Goal: *Lungs clear or at baseline  7/21/2022 0731 by William Almonte RN  Outcome: Progressing Towards Goal  7/20/2022 1843 by William Almonte RN  Outcome: Progressing Towards Goal  Goal: *Demonstrates ability to perform prescribed activity without shortness of breath or discomfort  7/21/2022 0731 by William Almonte RN  Outcome: Progressing Towards Goal  7/20/2022 1843 by William Almonte RN  Outcome: Progressing Towards Goal  Goal: *Verbalizes home exercise program, activity guidelines, cardiac precautions  7/21/2022 0731 by William Almonte RN  Outcome: Progressing Towards Goal  7/20/2022 1843 by William Almonte RN  Outcome: Progressing Towards Goal  Goal: *Optimal pain control at patient's stated goal  7/21/2022 0731 by William Almonte RN  Outcome: Progressing Towards Goal  7/20/2022 1843 by William Almonte RN  Outcome: Progressing Towards Goal  Goal: *Verbalizes understanding and describes prescribed diet  7/21/2022 0731 by Kelly Murillo RN  Outcome: Progressing Towards Goal  7/20/2022 1843 by Kelly Murillo RN  Outcome: Progressing Towards Goal  Goal: *Verbalizes name, dosage, time, side effects, and number of days to continue medications  7/21/2022 0731 by Kelly Murillo RN  Outcome: Progressing Towards Goal  7/20/2022 1843 by Kelly Murillo RN  Outcome: Progressing Towards Goal  Goal: *Weight  is stable  7/21/2022 0731 by Kelly Murillo RN  Outcome: Progressing Towards Goal  7/20/2022 1843 by Kelly Murillo RN  Outcome: Progressing Towards Goal  Goal: *No signs and symptoms of infection or wound complications  0/05/3431 0731 by Kelly Murillo RN  Outcome: Progressing Towards Goal  7/20/2022 1843 by Kelly Murillo RN  Outcome: Progressing Towards Goal  Goal: *Anxiety reduced or absent  7/21/2022 0731 by Kelly Murillo RN  Outcome: Progressing Towards Goal  7/20/2022 1843 by Kelly Murillo RN  Outcome: Progressing Towards Goal  Goal: *Verbalizes and demonstrates incision care  7/21/2022 0731 by Kelly Murillo RN  Outcome: Progressing Towards Goal  7/20/2022 1843 by Kelly Murillo RN  Outcome: Progressing Towards Goal  Goal: *Understands and describes signs and symptoms to report to providers(Stroke Metric)  7/21/2022 0731 by Kelly Murillo RN  Outcome: Progressing Towards Goal  7/20/2022 1843 by Kelly Murillo RN  Outcome: Progressing Towards Goal  Goal: *Describes follow-up/return visits to physicians  7/21/2022 0731 by Kelly Murillo RN  Outcome: Progressing Towards Goal  7/20/2022 1843 by Kelly Murillo RN  Outcome: Progressing Towards Goal  Goal: *Describes available resources and support systems  7/21/2022 0731 by Kelly Murillo RN  Outcome: Progressing Towards Goal  7/20/2022 1843 by Kelly Murillo RN  Outcome: Progressing Towards Goal  Goal: *Expresses feelings about diagnosis and surgery  7/21/2022 0731 by Kelly Murillo RN  Outcome: Progressing Towards Goal  7/20/2022 1843 by Manisha Orellana RN  Outcome: Progressing Towards Goal  Goal: *Influenza immunization  7/21/2022 0731 by Manisha Orellana RN  Outcome: Progressing Towards Goal  7/20/2022 1843 by Manisha Orellana RN  Outcome: Progressing Towards Goal  Goal: *Pneumococcal immunization  7/21/2022 0731 by Manisha Orellana RN  Outcome: Progressing Towards Goal  7/20/2022 1843 by Manisha Orellana RN  Outcome: Progressing Towards Goal

## 2022-07-21 NOTE — PROGRESS NOTES
Hospitalist Progress Note    Patient: Rubi Salas MRN: [id number]  CSN: [id number]    YOB: 1960  Age: 57 y.o. Sex: female    DOA: 7/13/2022 LOS:  LOS: 8 days            Assessment/Plan     Principal Problem:    S/P CABG x 3 (7/16/2022)      Overview: Dr. Osiris Gardner, 7/15/2022:  Triple Coronary Artery Bypass Surgery with LIMA to       LAD, Reverse Saphenous Vein Grafts to the Diagonal and Obtuse Marginal       Coronary Arteries    Active Problems:    NSTEMI (non-ST elevated myocardial infarction) ([facility] [address] (7/13/2022)    Coronary artery disease status post CABG: Patient who has a pneumothorax and a also has a chest tube that is in place and a CT surgery has been following for this and now will be followed by pulmonary critical care for the chest tube. Continue medical management. Aspirin, Lipitor, Plavix. Interval history: 77-year-old female presented to the emergency room with complaints of chest pain at [facility] and was noted to have elevated troponin and was started on heparin drip and NSTEMI was diagnosed. .  Patient underwent CABG and subsequently later on noticed to have pneumothorax and had chest tubes placed and has been monitored by CT surgery. They were not able to take for the last chest tube due to some air leak. CT surgery will not be available until Sunday evening and so the patient is transferred to the medicine service. Vital signs/Intake and Output:  Visit Vitals  /60   Pulse 74   Temp 98.2 °F (36.8 °C)   Resp 19   Ht 5' 2\" (1.575 m)   Wt 55.8 kg (123 lb)   SpO2 98%   BMI 22.50 kg/m²     Current Shift:  No intake/output data recorded.   Last three shifts:  07/20 0701 - 07/21 1900  In: 900 [P.O.:900]  Out: 1325 [Urine:1175]      Medications Reviewed      Labs: Results:       Chemistry Recent Labs     07/21/22  0505 07/20/22  0215   GLU 99 88    135*   K 3.9 3.7    103   CO2 29 30   BUN 18 29*   CREA 0.67 0.71   CA 8.2* 8.0*   AGAP 5 2*   BUCR 27* 41* CBC w/Diff Recent Labs     07/21/22  0505 07/20/22  0215   WBC 7.4 7.8   RBC 2.53* 2.53*   HGB 7.8* 7.8*   HCT 24.2* 24.1*    133*   GRANS 65 65   LYMPH 17* 21   EOS 2 1      Cardiac Enzymes No results for input(s): CPK, CKND1, GAYLE in the last 72 hours. No lab exists for component: CKRMB, TROIP   Coagulation No results for input(s): PTP, INR, APTT, INREXT in the last 72 hours. Lipid Panel Lab Results   Component Value Date/Time    Cholesterol, total 127 07/14/2022 05:39 AM    HDL Cholesterol 43 07/14/2022 05:39 AM    LDL, calculated 73.8 07/14/2022 05:39 AM    VLDL, calculated 10.2 07/14/2022 05:39 AM    Triglyceride 51 07/14/2022 05:39 AM    CHOL/HDL Ratio 3.0 07/14/2022 05:39 AM      BNP No results for input(s): BNPP in the last 72 hours. Liver Enzymes No results for input(s): TP, ALB, TBIL, AP in the last 72 hours.     No lab exists for component: SGOT, GPT, DBIL   Thyroid Studies Lab Results   Component Value Date/Time    TSH 0.68 07/13/2022 04:52 PM        Procedures/imaging: see electronic medical records for all procedures/Xrays and details which were not copied into this note but were reviewed prior to creation of Plan            July 21, 2022  Tri Cordova MD.  [facility]  [phone]

## 2022-07-21 NOTE — PROGRESS NOTES
[facility]         Failed clamping trial. Small right lateral ptx and stable right apical ptx on CXR. Air leak in [facility] when tube unclamped and larger air leak with suction. Patient remained with excellent sats on RA and no discomfort. Will leave on waterseal and suggest repeat clamping trial tomorrow or Saturday. As we do not have a cardiac surgeon starting at 0700 tomorrow, we will transfer the patient to the Hospitalist service at that time and Pulmonary will follow chest tube. I will be available by phone for any questions. Plan discussed with patient and she is agreeable. Discussed with Dr. Devorah Andrews and Dr. Sam Barnett. Dr. Rockwell Hatchet updated. ADDENDUM:  I spoke to Dr. Meaghan Phelps, on call Hospitalist, not Dr. Devorah Andrews.

## 2022-07-21 NOTE — PROGRESS NOTES
Home health orders sent to [facility] and Mitul Brit was notified.               ERMA GoodsonN RN  Care Management  Pager: [phone]

## 2022-07-21 NOTE — PROGRESS NOTES
CARDIAC SURGERY PROGRESS NOTE    2022  9:10 AM     CC:  Post Operative Day # 6     Chart, images and labs reviewed. Discussed with available staff. Interval History/Events of Past 24 hours:   Walks in ort[place] without supplemental oxygen. Improving cough and IS. Tolerates a regular diet without nausea. No air leak on waterseal.  Stable tiny, if any, right apical air space    Subjective:  Patient seen and examined on rounds today. No complaints  Pain level: controlled  Ambulating: well   Sleeping: well  Eating:  well  Sternal pain: Yes    BM: Yes    Objective:  Vital signs:   Visit Vitals  BP (!) 108/52   Pulse 68   Temp 98.3 °F (36.8 °C)   Resp 20   Ht 5' 2\" (1.575 m)   Wt 55.8 kg (123 lb)   SpO2 96%   BMI 22.50 kg/m²     Temp (24hrs), Av.2 °F (36.8 °C), Min:98 °F (36.7 °C), Max:98.3 °F (36.8 °C)    Admission Weight: Last Weight   Weight: 61.7 kg (136 lb) Weight: 55.8 kg (123 lb)     Telemetry: SR 75    Physical Examination:     General:  Alert, oriented   Lungs: Clear to auscultation without rales, wheezes or rhonchi. Chest: Dressings clean and dry. Midline incision healing well. Sternum stable. Heart: regular rate and rhythm, No murmur. Abdomen: Soft and non-tender without masses. Bowel sounds present. Extremities: Warm and well perfused. Edema absent. Incisions: clean, dry, no drainage. staple line intact  Neuro: No deficit. Beta-blocker: No due to  bradycardia  ASA: Yes   Statin: Yes  ACE-I: No  Diuretic: No     DVT Prophylaxis:   pneumatic compression boots: Yes  Compression stockings:  Yes  Enoxaparin:  Yes    Chest tubes:  present.   Air Leak:  No    Pacing wires:  not present    DME needs:   TBD          Labs:  Lab Results   Component Value Date/Time    WBC 7.4 2022 05:05 AM    HCT 24.2 (L) 2022 05:05 AM     2022 05:05 AM      Lab Results   Component Value Date/Time     2022 05:05 AM    K 3.9 2022 05:05 AM     2022 05:05 AM    CO2 29 07/21/2022 05:05 AM    GLU 99 07/21/2022 05:05 AM    BUN 18 07/21/2022 05:05 AM    CREA 0.67 07/21/2022 05:05 AM    CREA 0.71 07/20/2022 02:15 AM    CREA 0.86 07/18/2022 05:14 AM       Assessment:  S/P  CABG x 3  Respiratory parameters stable  Cardiac status stable     Plans:  1. Clamping trial now. CXR at 1 pm with possible tube removal.  If post tube removal CXR stable should be able to be discharged home. 2.  Euvolemic. No need for lasix. 3.  Continue to hold BB due to BP and post op herminio/JR  4. Continue ASA, Plavix and statin     Heart Hugger use is encouraged to mitigate pain and will also assist with deep breathing and incentive spirometry goals. Nancy Oliveira PA-C    PLEASE NOTE:  This document has been produced using voice recognition software. Unrecognized errors in transcription may be present. NOTE TO PATIENT:  The purpose of this note is to communicate optimally with the other providers involved in your care. It is written using standard medical terminology. If you have questions regarding details of the note please call my office at [phone] and make an appointment to discuss your concerns.

## 2022-07-21 NOTE — PROGRESS NOTES
Problem: Self Care Deficits Care Plan (Adult)  Goal: *Acute Goals and Plan of Care (Insert Text)  Description: Occupational Therapy Goals  Initiated 7/17/2022 within 7 day(s). 1.  Patient will perform grooming with supervision/set-up standing at the sink for >3 min with Good balance. 2.  Patient will perform upper body dressing with supervision/set-up. 3.  Patient will perform lower body dressing with supervision/set-up. 4.  Patient will perform toilet transfers with supervision/set-up. 5.  Patient will perform all aspects of toileting with supervision/set-up. 6.  Patient will recall 100% of sternal precautions and will follow them during ADLs w/o cues. 7.  Patient will utilize energy conservation techniques during functional activities w/o cues. Prior Level of Function: Pt reports being Mod Ind for ADLs and functional mobility. Pt will be staying with her daughter upon DC. Outcome: Progressing Towards Goal   OCCUPATIONAL THERAPY TREATMENT    Patient: Óscar Donato (24 y.o. female)  Date: 7/21/2022  Diagnosis: NSTEMI (non-ST elevated myocardial infarction) ([facility] [address] [I21.4] S/P CABG x 3  Procedure(s) (LRB):  CORONARY ARTERY BYPASS GRAFT (CABG) TIMES THREE (N/A) 6 Days Post-Op  Precautions: Fall, Sternal  PLOF: Pt reports being Mod Ind for ADLs and functional mobility. Pt will be staying with her daughter upon DC. Chart, occupational therapy assessment, plan of care, and goals were reviewed. ASSESSMENT:  Pt cleared by RN for OT tx at this time. Pt presented sitting upright in reclining chair upon entry and agreeable to work with OT. Pt able to recall sternal precautions this date w/ min cueing to utilizing heart hugger during functional transfers. She performed LB dressing donning socks with Supervision utilizing leg cross technique. STS transfer SBA with Rollator and maneuvered to sink ~ 10 ft w/ SBA. Pt stood at sink ~ 4 mins and performed hygiene tasks Supervision, no LOB noted.  She returned back to sitting in chair w/ SBA. Pt reports slight SOB, SPO2 assessed at 96% on RA and was educated on importance of performing PLB technique for optimal oxygenation. Pt positioned for comfort and left with B LE's elevated and all needs left within reach. RN made aware of pt's participation and position. Progression toward goals:  [x]          Improving appropriately and progressing toward goals  []          Improving slowly and progressing toward goals  []          Not making progress toward goals and plan of care will be adjusted     PLAN:  Patient continues to benefit from skilled intervention to address the above impairments. Continue treatment per established plan of care. Discharge Recommendations:  Home Health w/ increased family support  Further Equipment Recommendations for Discharge:  shower chair and N/A    [facility]: Jazmin Moore scored a 20/24 on the AM-PAC ADL Inpatient form. Current research shows that an AM-PAC score of 18 or greater is associated with a discharge to the patient's home setting. Based on the patient's AM-PAC score and their current ADL deficits, it is recommended that the patient have 2-3 sessions per week of Occupational Therapy at d/c to increase the patient's independence. Please see assessment section for further patient specific details     SUBJECTIVE:   Patient stated  I maybe able to go home later today. \"     OBJECTIVE DATA SUMMARY:   Cognitive/Behavioral Status:  Neurologic State: Alert  Orientation Level: Oriented X4  Cognition: Appropriate decision making, Appropriate for age attention/concentration, Appropriate safety awareness  Safety/Judgement: Awareness of environment, Fall prevention, Home safety    Functional Mobility and Transfers for ADLs:      Transfers:  Sit to Stand: Stand-by assistance  Stand to Sit: Stand-by assistance      Bathroom Mobility: Stand-by assistance (Rollator)        Balance:  Sitting: Intact  Standing: Impaired; With support  Standing - Static: Good  Standing - Dynamic : Fair (+)    ADL Intervention:       Grooming  Position Performed: Standing  Washing Face: Supervision  Washing Hands: Supervision    Lower Body Dressing Assistance  Socks: Supervision  Leg Crossed Method Used: Yes  Position Performed: Seated in chair  Cues: Verbal cues provided    Pain:  Pain level pre-treatment: 2/10   Pain level post-treatment: 2/10  Pain Intervention(s): Medication (see MAR); Rest, Ice, Repositioning   Response to intervention: Nurse notified, See doc flow    Activity Tolerance:    Good   Please refer to the flowsheet for vital signs taken during this treatment. After treatment:   [x]  Patient left in no apparent distress sitting up in chair  []  Patient left in no apparent distress in bed  [x]  Call bell left within reach  [x]  Nursing notified  []  Caregiver present  []  Bed alarm activated    COMMUNICATION/EDUCATION:   [x] Role of Occupational Therapy in the acute care setting  [x] Home safety education was provided and the patient/caregiver indicated understanding. [x] Patient/family have participated as able in working towards goals and plan of care. [x] Patient/family agree to work toward stated goals and plan of care. [] Patient understands intent and goals of therapy, but is neutral about his/her participation. [] Patient is unable to participate in goal setting and plan of care. Thank you for this referral.  GEETA Salvador  Time Calculation: 23 mins    [facility] AM-PAC® Daily Activity Inpatient Short Form (6-Clicks)    How much HELP from another person does the patient currently need    (If the patient hasn't done an activity recently, how much help from another person do you think he/she would need if he/she tried?)   Total (Total A or Dep)   A Lot  (Mod to Max A)   A Little (Sup or Min A)   None (Mod I to I)   Putting on and taking off regular lower body clothing? [] 1 [] 2 [x] 3 [] 4   2.  Bathing (including washing, rinsing, drying)? [] 1 [] 2 [x] 3 [] 4   3. Toileting, which includes using toilet, bedpan or urinal?   [] 1 [] 2 [x] 3 [] 4   4. Putting on and taking off regular upper body clothing? [] 1 [] 2 [x] 3 [] 4   5. Taking care of personal grooming such as brushing teeth? [] 1 [] 2 [] 3 [x] 4   6. Eating meals?    [] 1 [] 2 [] 3 [x] 4

## 2022-07-21 NOTE — PROGRESS NOTES
Cardiology Progress Note    Admit Date: 7/13/2022  Attending Cardiologist: Dr. Kristin Rendon  Patient seen and examined independently. She is sitting in a chair and looks comfortable. She has no specific complaints. Chest tube being clamped with possible removal later today. Agree with assessment and plan as noted below. Jayesh Soler MD  Assessment:     -CAD, s/p CABG x 3 (LIMA-LAD, SVG-diagonal, SVG-OM) by Dr. Schmidt Pod 7/15/2022  -NSTEMI. Given  mg and SL NTG x 1 at Urgent Care. Cardiac cath 7/13/2022 with findings as follows:  LM: Angiographically normal.  LAD: Mid LAD has diffuse up to 70-75% long disease. Distal vessel appeared approximately 2 mm in size without any significant obstructive disease. Diagonal branch with 99% ostial disease, bifurcating vessel, approximately 1.5-1.75 mm vessel. LCx: Ostial and proximal 90% stenosis of native LCx. High OM branch is 95-99% stenosis with faint forward flow. This vessel appears very small caliber. OM2 and OM3: Small caliber vessel with luminal irregularities. RCA: Proximal and mid diffuse disease. Distally severe disease. There is evidence of left to right collateral.  -Echo 0/29/8524: [place] LV systolic function with EF 55-60% with normal wall motion, normal LV size, normal wall thickness, normal diastolic function, no significant valvular disease.  -Tobacco abuse, approx. 20 pack-year smoking history     No primary cardiologist, initial referral completed by Dr. Ernestina Hanna:     -Routine post-operative care per cardiothoracic surgery team, appreciate assistance.  -Continue ASA, Lipitor, Plavix. PO Lopressor on hold per surgery team.  -Dispo planning per primary team.  Pt can follow-up with Dr. Riojas in 3 weeks. Subjective:     No new complaints.      Objective:      Patient Vitals for the past 8 hrs:   Temp Pulse Resp BP SpO2   07/21/22 0909 -- 71 29 (!) 97/50 95 %   07/21/22 0734 98.3 °F (36.8 °C) 68 20 (!) 108/52 96 %   07/21/22 0600 -- 67 19 (!) 118/57 100 %   07/21/22 0500 -- 68 18 107/64 98 %   07/21/22 0400 98 °F (36.7 °C) 72 26 98/60 97 %   07/21/22 0300 -- 74 19 100/61 97 %         Patient Vitals for the past 96 hrs:   Weight   07/20/22 0400 55.8 kg (123 lb)   07/19/22 0400 55 kg (121 lb 4.1 oz)       TELE: normal sinus rhythm               Current Facility-Administered Medications   Medication Dose Route Frequency Last Admin    clopidogreL (PLAVIX) tablet 75 mg  75 mg Oral DAILY 75 mg at 07/21/22 0835    guaiFENesin ER (MUCINEX) tablet 600 mg  600 mg Oral Q12H 600 mg at 07/21/22 0835    albuterol-ipratropium (DUO-NEB) 2.5 MG-0.5 MG/3 ML  3 mL Nebulization Q6H PRN      insulin lispro (HUMALOG) injection   SubCUTAneous AC&HS 3 Units at 07/17/22 2139    glucose chewable tablet 16 g  4 Tablet Oral PRN      glucagon (GLUCAGEN) injection 1 mg  1 mg IntraMUSCular PRN      dextrose 10% infusion 0-250 mL  0-250 mL IntraVENous PRN      lidocaine (XYLOCAINE) 2 % viscous solution 15 mL  15 mL Mucous Membrane PRN      morphine injection 2-4 mg  2-4 mg IntraVENous Q2H PRN 2 mg at 07/16/22 1917    sodium chloride (NS) flush 5-40 mL  5-40 mL IntraVENous Q8H 10 mL at 07/21/22 0519    sodium chloride (NS) flush 5-40 mL  5-40 mL IntraVENous PRN      acetaminophen (TYLENOL) tablet 650 mg  650 mg Oral Q4H  mg at 07/19/22 0820    HYDROcodone-acetaminophen (NORCO) 5-325 mg per tablet 1-2 Tablet  1-2 Tablet Oral Q6H PRN 1 Tablet at 07/21/22 0835    naloxone (NARCAN) injection 0.4 mg  0.4 mg IntraVENous PRN      albuterol (PROVENTIL VENTOLIN) nebulizer solution 2.5 mg  2.5 mg Nebulization Q4H PRN      [Held by provider] metoprolol tartrate (LOPRESSOR) tablet 12.5 mg  12.5 mg Oral Q12H 12.5 mg at 07/20/22 2141    ondansetron (ZOFRAN) injection 4 mg  4 mg IntraVENous Q4H PRN 4 mg at 07/16/22 0248    aspirin delayed-release tablet 81 mg  81 mg Oral DAILY 81 mg at 07/21/22 0835    chlorhexidine (PERIDEX) 0.12 % mouthwash 10 mL  10 mL Oral BID 10 mL at 07/21/22 1546    docusate sodium (COLACE) capsule 100 mg  100 mg Oral  mg at 07/21/22 0835    ELECTROLYTE REPLACEMENT PROTOCOL - Potassium Standard Dosing  1 Each Other PRN      ELECTROLYTE REPLACEMENT PROTOCOL - Magnesium  1 Each Other PRN      enoxaparin (LOVENOX) injection 40 mg  40 mg SubCUTAneous Q24H 40 mg at 07/20/22 1801    famotidine (PEPCID) tablet 20 mg  20 mg Oral BID 20 mg at 07/21/22 0835    sodium chloride (NS) flush 5-40 mL  5-40 mL IntraVENous PRN      nicotine (NICODERM CQ) 14 mg/24 hr patch 1 Patch  1 Patch TransDERmal DAILY 1 Patch at 07/21/22 0834    atorvastatin (LIPITOR) tablet 40 mg  40 mg Oral QHS 40 mg at 07/20/22 2141         Intake/Output Summary (Last 24 hours) at 7/21/2022 1038  Last data filed at 7/21/2022 1036  Gross per 24 hour   Intake 480 ml   Output 904 ml   Net -424 ml       Physical Exam:  General:  alert, cooperative, no distress, appears stated age  Neck:  supple  Lungs:  clear to auscultation bilaterally  Heart:  regular rate and rhythm  Abdomen:  abdomen is soft without significant tenderness, masses, organomegaly or guarding  Extremities:  atraumatic, no edema    Visit Vitals  BP (!) 97/50   Pulse 71   Temp 98.3 °F (36.8 °C)   Resp 29   Ht 5' 2\" (1.575 m)   Wt 55.8 kg (123 lb)   SpO2 95%   BMI 22.50 kg/m²       Data Review:     Labs: Results:       Chemistry Recent Labs     07/21/22  0505 07/20/22  0215   GLU 99 88    135*   K 3.9 3.7    103   CO2 29 30   BUN 18 29*   CREA 0.67 0.71   CA 8.2* 8.0*   MG 1.9 2.1   AGAP 5 2*   BUCR 27* 41*      CBC w/Diff Recent Labs     07/21/22  0505 07/20/22  0215   WBC 7.4 7.8   RBC 2.53* 2.53*   HGB 7.8* 7.8*   HCT 24.2* 24.1*    133*   GRANS 65 65   LYMPH 17* 21   EOS 2 1      Lipid Panel Lab Results   Component Value Date/Time    Cholesterol, total 127 07/14/2022 05:39 AM    HDL Cholesterol 43 07/14/2022 05:39 AM    LDL, calculated 73.8 07/14/2022 05:39 AM    VLDL, calculated 10.2 07/14/2022 05:39 AM    Triglyceride 51 07/14/2022 05:39 AM    CHOL/HDL Ratio 3.0 07/14/2022 05:39 AM      Thyroid Studies Lab Results   Component Value Date/Time    TSH 0.68 07/13/2022 04:52 PM          Signed By: Luna Manzanares PA-C     July 21, 2022

## 2022-07-22 ENCOUNTER — HOME HEALTH ADMISSION (OUTPATIENT)
Dept: HOME HEALTH SERVICES | Facility: HOME HEALTH | Age: 62
End: 2022-07-22
Payer: COMMERCIAL

## 2022-07-22 ENCOUNTER — APPOINTMENT (OUTPATIENT)
Dept: GENERAL RADIOLOGY | Age: 62
DRG: 234 | End: 2022-07-22
Attending: PHYSICIAN ASSISTANT
Payer: COMMERCIAL

## 2022-07-22 LAB
ANION GAP SERPL CALC-SCNC: 6 MMOL/L (ref 3–18)
BUN SERPL-MCNC: 13 MG/DL (ref 7–18)
BUN/CREAT SERPL: 20 (ref 12–20)
CALCIUM SERPL-MCNC: 8.6 MG/DL (ref 8.5–10.1)
CHLORIDE SERPL-SCNC: 104 MMOL/L (ref 100–111)
CO2 SERPL-SCNC: 27 MMOL/L (ref 21–32)
CREAT SERPL-MCNC: 0.66 MG/DL (ref 0.6–1.3)
GLUCOSE BLD STRIP.AUTO-MCNC: 108 MG/DL (ref 70–110)
GLUCOSE BLD STRIP.AUTO-MCNC: 114 MG/DL (ref 70–110)
GLUCOSE BLD STRIP.AUTO-MCNC: 117 MG/DL (ref 70–110)
GLUCOSE BLD STRIP.AUTO-MCNC: 121 MG/DL (ref 70–110)
GLUCOSE SERPL-MCNC: 105 MG/DL (ref 74–99)
POTASSIUM SERPL-SCNC: 4.3 MMOL/L (ref 3.5–5.5)
SODIUM SERPL-SCNC: 137 MMOL/L (ref 136–145)

## 2022-07-22 PROCEDURE — 74011250637 HC RX REV CODE- 250/637: Performed by: INTERNAL MEDICINE

## 2022-07-22 PROCEDURE — 99232 SBSQ HOSP IP/OBS MODERATE 35: CPT | Performed by: HOSPITALIST

## 2022-07-22 PROCEDURE — 82962 GLUCOSE BLOOD TEST: CPT

## 2022-07-22 PROCEDURE — 74011250636 HC RX REV CODE- 250/636: Performed by: PHYSICIAN ASSISTANT

## 2022-07-22 PROCEDURE — 36415 COLL VENOUS BLD VENIPUNCTURE: CPT

## 2022-07-22 PROCEDURE — 65660000004 HC RM CVT STEPDOWN

## 2022-07-22 PROCEDURE — 74011000250 HC RX REV CODE- 250: Performed by: PHYSICIAN ASSISTANT

## 2022-07-22 PROCEDURE — 97116 GAIT TRAINING THERAPY: CPT

## 2022-07-22 PROCEDURE — 0W9930Z DRAINAGE OF RIGHT PLEURAL CAVITY WITH DRAINAGE DEVICE, PERCUTANEOUS APPROACH: ICD-10-PCS | Performed by: RADIOLOGY

## 2022-07-22 PROCEDURE — 71045 X-RAY EXAM CHEST 1 VIEW: CPT

## 2022-07-22 PROCEDURE — 80048 BASIC METABOLIC PNL TOTAL CA: CPT

## 2022-07-22 PROCEDURE — 99222 1ST HOSP IP/OBS MODERATE 55: CPT | Performed by: INTERNAL MEDICINE

## 2022-07-22 PROCEDURE — 94762 N-INVAS EAR/PLS OXIMTRY CONT: CPT

## 2022-07-22 PROCEDURE — 74011250637 HC RX REV CODE- 250/637: Performed by: PHYSICIAN ASSISTANT

## 2022-07-22 PROCEDURE — 94667 MNPJ CHEST WALL 1ST: CPT

## 2022-07-22 RX ADMIN — SODIUM CHLORIDE, PRESERVATIVE FREE 10 ML: 5 INJECTION INTRAVENOUS at 08:58

## 2022-07-22 RX ADMIN — FAMOTIDINE 20 MG: 20 TABLET ORAL at 08:56

## 2022-07-22 RX ADMIN — GUAIFENESIN 600 MG: 600 TABLET ORAL at 21:45

## 2022-07-22 RX ADMIN — CHLORHEXIDINE GLUCONATE 0.12% ORAL RINSE 10 ML: 1.2 LIQUID ORAL at 18:38

## 2022-07-22 RX ADMIN — HYDROCODONE BITARTRATE AND ACETAMINOPHEN 1 TABLET: 5; 325 TABLET ORAL at 08:56

## 2022-07-22 RX ADMIN — CLOPIDOGREL BISULFATE 75 MG: 75 TABLET ORAL at 08:56

## 2022-07-22 RX ADMIN — GUAIFENESIN 600 MG: 600 TABLET ORAL at 08:57

## 2022-07-22 RX ADMIN — FAMOTIDINE 20 MG: 20 TABLET ORAL at 18:38

## 2022-07-22 RX ADMIN — ASPIRIN 81 MG: 81 TABLET, COATED ORAL at 08:56

## 2022-07-22 RX ADMIN — DOCUSATE SODIUM 100 MG: 100 CAPSULE, LIQUID FILLED ORAL at 08:57

## 2022-07-22 RX ADMIN — ATORVASTATIN CALCIUM 40 MG: 40 TABLET, FILM COATED ORAL at 21:45

## 2022-07-22 RX ADMIN — CHLORHEXIDINE GLUCONATE 0.12% ORAL RINSE 10 ML: 1.2 LIQUID ORAL at 08:56

## 2022-07-22 RX ADMIN — ENOXAPARIN SODIUM 40 MG: 100 INJECTION SUBCUTANEOUS at 18:39

## 2022-07-22 RX ADMIN — SODIUM CHLORIDE, PRESERVATIVE FREE 10 ML: 5 INJECTION INTRAVENOUS at 15:16

## 2022-07-22 RX ADMIN — SODIUM CHLORIDE, PRESERVATIVE FREE 10 ML: 5 INJECTION INTRAVENOUS at 21:45

## 2022-07-22 RX ADMIN — DOCUSATE SODIUM 100 MG: 100 CAPSULE, LIQUID FILLED ORAL at 18:38

## 2022-07-22 NOTE — PROGRESS NOTES
Problem: Pressure Injury - Risk of  Goal: *Prevention of pressure injury  Description: Document Vargas Scale and appropriate interventions in the flowsheet. Outcome: Progressing Towards Goal  Note: Pressure Injury Interventions:  Sensory Interventions: Assess need for specialty bed, Minimize linen layers, Keep linens dry and wrinkle-free    Moisture Interventions: Absorbent underpads    Activity Interventions: Pressure redistribution bed/mattress(bed type), PT/OT evaluation    Mobility Interventions: Pressure redistribution bed/mattress (bed type), PT/OT evaluation    Nutrition Interventions: Document food/fluid/supplement intake    Friction and Shear Interventions: Minimize layers                Problem: Falls - Risk of  Goal: *Absence of Falls  Description: Document Khadra Fall Risk and appropriate interventions in the flowsheet.   Outcome: Progressing Towards Goal  Note: Fall Risk Interventions:  Mobility Interventions: Patient to call before getting OOB, Bed/chair exit alarm, Communicate number of staff needed for ambulation/transfer         Medication Interventions: Bed/chair exit alarm, Patient to call before getting OOB    Elimination Interventions: Bed/chair exit alarm, Call light in reach    History of Falls Interventions: Bed/chair exit alarm, Door open when patient unattended         Problem: Patient Education: Go to Patient Education Activity  Goal: Patient/Family Education  Outcome: Progressing Towards Goal

## 2022-07-22 NOTE — PROGRESS NOTES
Problem: Mobility Impaired (Adult and Pediatric)  Goal: *Acute Goals and Plan of Care (Insert Text)  Description: Physical Therapy Goals  Initiated 7/17/2022 and to be accomplished within 7 day(s)  1. Patient will move from supine to sit and sit to supine , scoot up and down, and roll side to side in bed with Anne. 2.  Patient will transfer from bed to chair and chair to bed with Anne using the least restrictive device. 3.  Patient will perform sit to stand with Anne. 4.  Patient will ambulate with Anne for 300 feet with the least restrictive device. 5.  Patient will ascend/descend 12 stairs with unilateral handrail(s) with Anne. PLOF: Pt reporting she lives in [address], independent PTA, will be going to live with her daughter at discharge. Reporting her daughter lives in 2nd floor apartment. 7/22/2022 1209 by Horacio Hicks, PT  Outcome: Progressing Towards Goal     PHYSICAL THERAPY TREATMENT    Patient: Anderson Babinski (23 y.o. female)  Date: 7/22/2022  Diagnosis: NSTEMI (non-ST elevated myocardial infarction) ([facility] [address] [I21.4] S/P CABG x 3  Procedure(s) (LRB):  CORONARY ARTERY BYPASS GRAFT (CABG) TIMES THREE (N/A) 7 Days Post-Op  Precautions: Fall, Sternal    ASSESSMENT:  Pt cleared to participate in PT session, pt received sitting in recliner and agreeable to therapy session. Pt completing sit to stand with supervision to rollator, appropriate use of heart hugger. Pt ambulating x250 feet with slow gait speed with supervision. Ascending/descending 5 steps with L handrail and supervision. Pt positioned for comfort and educated to call for assist before getting up, pt verbalized understanding. Pt left with all needs met and call bell in reach. RN notified of position and participation.      Progression toward goals:   [x]      Improving appropriately and progressing toward goals  []      Improving slowly and progressing toward goals  []      Not making progress toward goals and plan of care will be adjusted     PLAN:  Patient continues to benefit from skilled intervention to address the above impairments. Continue treatment per established plan of care. Discharge Recommendations:  Home Health  Further Equipment Recommendations for Discharge:  rolling walker    AMPAC: Kitty Randhawa scored a 18/24 on the AM-PAC short mobility form. Current research shows that an AM-PAC score of 18 (14 if omitting stairs) or greater is typically associated with a discharge to the patient's home setting. Based on the patient's AM-PAC score and their current functional mobility deficits, it is recommended that the patient have 2-3 sessions per week of Physical Therapy at d/c to increase the patient's independence. At this time, this patient demonstrates the endurance and safety to discharge home with [address] (home vs OP services) and a follow up treatment frequency of 2-3x/wk. Please see assessment section for further patient specific details. This AMPAC score should be considered in conjunction with interdisciplinary team recommendations to determine the most appropriate discharge setting. Patient's social support, diagnosis, medical stability, and prior level of function should also be taken into consideration. SUBJECTIVE:   Patient stated I just need to take my time.     OBJECTIVE DATA SUMMARY:   Critical Behavior:  Neurologic State: Alert  Orientation Level: Oriented X4  Cognition: Follows commands  Safety/Judgement: Awareness of environment, Fall prevention, Home safety  Functional Mobility Training:  Bed Mobility:     Supine to Sit:  (found up in chair)     Scooting: Supervision    Transfers:  Sit to Stand: Supervision  Stand to Sit: Modified independent    Balance:  Sitting: Intact  Sitting - Static: Good (unsupported)  Sitting - Dynamic: Good (unsupported)  Standing: Intact; With support  Standing - Static: Good  Standing - Dynamic : Good      Posture:   Posture (WDL): Within defined limits     Ambulation/Gait Training:  Distance (ft): 250 Feet (ft)  Assistive Device: Walker, rollator  Ambulation - Level of Assistance: Supervision    Gait Abnormalities: Decreased step clearance    Base of Support: Narrowed     Speed/Na: Slow;Pace decreased (<100 feet/min)  Step Length: Right shortened;Left shortened    Stairs:  Number of Stairs Trained: 6  Stairs - Level of Assistance: Supervision  Rail Use: Left       Pain:  Pain level pre-treatment: 0/10  Pain level post-treatment: 0/10     Activity Tolerance:   Good     Please refer to the flowsheet for vital signs taken during this treatment. After treatment:   [x] Patient left in no apparent distress sitting up in chair  [] Patient left in no apparent distress in bed  [x] Call bell left within reach  [x] Nursing notified  [] Caregiver present  [] Bed alarm activated  [] SCDs applied      COMMUNICATION/EDUCATION:   [x]         Role of Physical Therapy in the acute care setting. [x]         Fall prevention education was provided and the patient/caregiver indicated understanding. [x]         Patient/family have participated as able in working toward goals and plan of care. [x]         Patient/family agree to work toward stated goals and plan of care. []         Patient understands intent and goals of therapy, but is neutral about his/her participation.   []         Patient is unable to participate in stated goals/plan of care: ongoing with therapy staff.  []         Other:        Demetria Martinez, PT   Time Calculation: 16 mins    [employer]in AM-PAC® Basic Mobility Inpatient Short Form (6-Clicks) Version 2    How much HELP from another person does the patient currently need    (If the patient hasn't done an activity recently, how much help from another person do you think he/she would need if he/she tried?)   Total (Total A or Dep)   A Lot  (Mod to Max A)   A Little (Sup or Min A)   None (Mod I to I)   Turning from your back to your side while in a flat bed without using bedrails? [] 1 [] 2 [x] 3 [] 4   2. Moving from lying on your back to sitting on the side of a flat bed without using bedrails? [] 1 [] 2 [x] 3 [] 4   3. Moving to and from a bed to a chair (including a wheelchair)? [] 1 [] 2 [x] 3 [] 4   4. Standing up from a chair using your arms (e.g., wheelchair, or bedside chair)? [] 1 [] 2 [x] 3 [] 4   5. Walking in hospital room? [] 1 [] 2 [x] 3 [] 4   6. Climbing 3-5 steps with a railing?+   [] 1 [] 2 [x] 3 [] 4   +If stair climbing cannot be assessed, skip item #6. Sum responses from items 1-5.

## 2022-07-22 NOTE — PROGRESS NOTES
Per pt, her pcp is Dr. Merna Branham. She stated she will be going to her daughter's home when discharged and address is [address] [place] [address].                 ERMA FloresN RN  Care Management  Pager: [phone]

## 2022-07-22 NOTE — PROGRESS NOTES
Pt arrived on unit      TRANSFER - IN REPORT:    Verbal report received from JUMANA Dunn(name) on Amanda Lopez  being received from [facility] ICU(unit) for routine progression of care      Report consisted of patients Situation, Background, Assessment and   Recommendations(SBAR). Information from the following report(s) SBAR, Kardex, Intake/Output, MAR, and Cardiac Rhythm NSR with PVCs  was reviewed with the receiving nurse. Opportunity for questions and clarification was provided. Assessment completed upon patients arrival to unit and care assumed.      Wound Prevention Checklist    Patient: Amanda Lopez (44 y.o. female)  Date: 7/22/2022  Diagnosis: NSTEMI (non-ST elevated myocardial infarction) ([facility] [address] [I21.4] S/P CABG x 3    Precautions: Fall, Sternal       []  Heel prevention boots placed on patient    []  Patient turned q2h during shift    []  Lift team ordered    []  Patient on Garrett bed/Specialty bed    [x]  Each Wound is documented during shift (Stage, Color, drainage, odor, measurements, and dressings)    [x]  Dual skin checks done at bedside during shift report with Zheng Kebede RN

## 2022-07-22 NOTE — PROGRESS NOTES
Hospitalist Progress Note    Patient: Corbin Dc MRN: [id number]  [facility]: [id number]    YOB: 1960  Age: 57 y.o. Sex: female    DOA: 7/13/2022 LOS:  LOS: 9 days            Assessment/Plan     Principal Problem:    S/P CABG x 3 (7/16/2022)      Overview: Dr. Brett Chau, 7/15/2022:  Triple Coronary Artery Bypass Surgery with LIMA to       LAD, Reverse Saphenous Vein Grafts to the Diagonal and Obtuse Marginal       Coronary Arteries    Active Problems:    NSTEMI (non-ST elevated myocardial infarction) ([facility] [address] (7/13/2022)  Coronary artery disease status post CABG: Patient who has a pneumothorax and a also has a chest tube that is in place and a CT surgery has been following for this and now will be followed by pulmonary critical care for the chest tube. Continue medical management. Aspirin, Lipitor, Plavix. Interval history: 27-year-old female presented to the emergency room with complaints of chest pain at [facility] and was noted to have elevated troponin and was started on heparin drip and NSTEMI was diagnosed. .    Patient underwent CABG and subsequently later on noticed to have pneumothorax and had chest tubes placed and has been monitored by CT surgery. They were not able to remove the last chest tube due to some air leak. CT surgery will not be available until Sunday evening and so the patient is transferred to the medicine service.     7/22-no new events overnight, pulmonology consulted, advised to continue chest tube to waterseal.  Continue clamping trials until able to tolerate for successful removal.  Patient seen by PT OT, recommending home with home health      Vital signs/Intake and Output:  Visit Vitals  BP 98/65 (BP 1 Location: Right upper arm, BP Patient Position: Sitting)   Pulse 73   Temp 98.3 °F (36.8 °C)   Resp 14   Ht 5' 2\" (1.575 m)   Wt 55.8 kg (123 lb)   SpO2 100%   BMI 22.50 kg/m²     Current Shift:  07/22 0701 - 07/22 1900  In: 120 [P.O.:120]  Out: 260 [Urine:200]  Last three shifts:  07/20 1901 - 07/22 0700  In: 390 [P.O.:390]  Out: 1920 [Urine:1750]      Medications Reviewed      Labs: Results:       Chemistry Recent Labs     07/22/22  0534 07/21/22  0505 07/20/22  0215   * 99 88    137 135*   K 4.3 3.9 3.7    103 103   CO2 27 29 30   BUN 13 18 29*   CREA 0.66 0.67 0.71   CA 8.6 8.2* 8.0*   AGAP 6 5 2*   BUCR 20 27* 41*      CBC w/Diff Recent Labs     07/21/22  0505 07/20/22  0215   WBC 7.4 7.8   RBC 2.53* 2.53*   HGB 7.8* 7.8*   HCT 24.2* 24.1*    133*   GRANS 65 65   LYMPH 17* 21   EOS 2 1      Cardiac Enzymes No results for input(s): CPK, CKND1, GAYLE in the last 72 hours. No lab exists for component: CKRMB, TROIP   Coagulation No results for input(s): PTP, INR, APTT, INREXT, INREXT in the last 72 hours. Lipid Panel Lab Results   Component Value Date/Time    Cholesterol, total 127 07/14/2022 05:39 AM    HDL Cholesterol 43 07/14/2022 05:39 AM    LDL, calculated 73.8 07/14/2022 05:39 AM    VLDL, calculated 10.2 07/14/2022 05:39 AM    Triglyceride 51 07/14/2022 05:39 AM    CHOL/HDL Ratio 3.0 07/14/2022 05:39 AM      BNP No results for input(s): BNPP in the last 72 hours. Liver Enzymes No results for input(s): TP, ALB, TBIL, AP in the last 72 hours.     No lab exists for component: SGOT, GPT, DBIL   Thyroid Studies Lab Results   Component Value Date/Time    TSH 0.68 07/13/2022 04:52 PM        Procedures/imaging: see electronic medical records for all procedures/Xrays and details which were not copied into this note but were reviewed prior to creation of Plan            July 22, 2022  Yue Rose MD

## 2022-07-22 NOTE — PROGRESS NOTES
Problem: Pressure Injury - Risk of  Goal: *Prevention of pressure injury  Description: Document Vargas Scale and appropriate interventions in the flowsheet. Outcome: Progressing Towards Goal  Note: Pressure Injury Interventions:  Sensory Interventions: Assess need for specialty bed, Minimize linen layers, Keep linens dry and wrinkle-free    Moisture Interventions: Absorbent underpads    Activity Interventions: Pressure redistribution bed/mattress(bed type), PT/OT evaluation    Mobility Interventions: Pressure redistribution bed/mattress (bed type), PT/OT evaluation    Nutrition Interventions: Document food/fluid/supplement intake    Friction and Shear Interventions: Minimize layers                Problem: Patient Education: Go to Patient Education Activity  Goal: Patient/Family Education  Outcome: Progressing Towards Goal     Problem: Falls - Risk of  Goal: *Absence of Falls  Description: Document Khadra Fall Risk and appropriate interventions in the flowsheet.   Outcome: Progressing Towards Goal  Note: Fall Risk Interventions:  Mobility Interventions: Patient to call before getting OOB, Bed/chair exit alarm, Communicate number of staff needed for ambulation/transfer         Medication Interventions: Bed/chair exit alarm, Patient to call before getting OOB    Elimination Interventions: Bed/chair exit alarm, Call light in reach    History of Falls Interventions: Bed/chair exit alarm, Door open when patient unattended         Problem: Patient Education: Go to Patient Education Activity  Goal: Patient/Family Education  Outcome: Progressing Towards Goal     Problem: Nutrition Deficit  Goal: *Optimize nutritional status  Outcome: Progressing Towards Goal     Problem: CABG: Post-Op Day 5  Goal: Medications  Outcome: Progressing Towards Goal  Goal: Respiratory  Outcome: Progressing Towards Goal     Problem: Pain  Goal: *Control of Pain  Outcome: Progressing Towards Goal

## 2022-07-22 NOTE — CONSULTS
[facility]  Pulmonary, Critical Care, and Sleep Medicine    Initial Patient Referral report    Name: Ganesh Wade MRN: [id number]   : 1960 Hospital: [address] [place] [address] Dr   Date: 2022        IMPRESSION:   Right pneumothorax with failed clamping trial necessitating continuation of chest tube. Bilateral chest tubes were placed intraoperatively with successful removal of left-sided chest tube. Imaging does not show any significant pleural effusion, small apical pneumothorax likely. S/p CABG x3-LIMA to LAD, SVG-diagonal, SVG-OM on 7/15/2022  S/p NSTEMI  History of tobacco use-20-pack-year smoking      RECOMMENDATIONS:     Continue chest tube to waterseal.  Will follow-up imaging, clamping trials until able to tolerate for successful removal.  Bronchial hygiene protocol  Continue current medical management per primary team  Aspiration precautions  OT, PT, OOB and ambulate  Will Follow for chest tube monitoring and management until cardiothoracic services available  DVT, PUD prophylaxis     Subjective: This patient has been seen and evaluated at the request of  Kavita BUTTERFIELD for chest tube management. Patient is a 57 y.o. female had initially presented to the ER with complaints of chest pain at [facility] and noted to have elevated troponin. She was started on heparin drip initially and after subsequent evaluation underwent coronary artery bypass grafting-triple-vessel LIMA to LAD, reverse saphenous vein grafts to diagonal and obtuse marginal coronary arteries. Patient postop has been recovering but noted to have a right pneumothorax needing chest tube placement. Patient has been ambulating, denies any complaints of chest pain except when she tries to cough hard. She is making all efforts at coughing and deep breathing and states that she is able to expectorate mucus with some difficulty. She is requiring a sternal brace.   Chest tube could not be removed after a clamping trial and left in place. Overnight she was left on waterseal and today does not have any significant leak. Pulmonology was requested to see the patient for chest tube management since there will be a gap in cardiothoracic coverage for next 2 days. Her left-sided chest tube and mediastinal drains were successfully removed. Her initial postop course was complicated by bradycardia arrhythmias which resolved after discontinuation of amiodarone  She required blood transfusion. No other complicating issues noted    I have reviewed all of the available data including the patient's previous history external records and radiological imaging available for review. In addition applicable cardiology and other lab data were also reviewed. Past Medical History:   Diagnosis Date    Arthritis       Past Surgical History:   Procedure Laterality Date    HX PARTIAL HYSTERECTOMY        Prior to Admission medications    Medication Sig Start Date End Date Taking? Authorizing Provider   ibuprofen (MOTRIN) 600 mg tablet Take 1 Tab by mouth every six (6) hours as needed for Pain. 1/1/20   Tamra Catherine PA-C   cyclobenzaprine (FLEXERIL) 10 mg tablet Take 1 Tab by mouth three (3) times daily as needed for Muscle Spasm(s). 1/1/20   Tamra Catherine PA-C   acetaminophen (TYLENOL) 325 mg tablet Take 2 Tabs by mouth every four (4) hours as needed for Pain.  10/11/17   Merry Koch MD     No Known Allergies   Social History     Tobacco Use    Smoking status: Every Day     Packs/day: 0.50     Types: Cigarettes    Smokeless tobacco: Not on file   Substance Use Topics    Alcohol use: No      Family History   Problem Relation Age of Onset    Heart Disease Maternal Grandmother     Heart Attack Daughter       Current Facility-Administered Medications   Medication Dose Route Frequency    clopidogreL (PLAVIX) tablet 75 mg  75 mg Oral DAILY    guaiFENesin ER (MUCINEX) tablet 600 mg  600 mg Oral Q12H    insulin lispro (HUMALOG) injection   SubCUTAneous AC&HS    sodium chloride (NS) flush 5-40 mL  5-40 mL IntraVENous Q8H    [Held by provider] metoprolol tartrate (LOPRESSOR) tablet 12.5 mg  12.5 mg Oral Q12H    aspirin delayed-release tablet 81 mg  81 mg Oral DAILY    chlorhexidine (PERIDEX) 0.12 % mouthwash 10 mL  10 mL Oral BID    docusate sodium (COLACE) capsule 100 mg  100 mg Oral BID    enoxaparin (LOVENOX) injection 40 mg  40 mg SubCUTAneous Q24H    famotidine (PEPCID) tablet 20 mg  20 mg Oral BID    nicotine (NICODERM CQ) 14 mg/24 hr patch 1 Patch  1 Patch TransDERmal DAILY    atorvastatin (LIPITOR) tablet 40 mg  40 mg Oral QHS         REVIEW OF SYSTEMS:       [x]Total of 12 systems reviewed as follows:     GENERAL:no fever and chills   HEENT: no sinus congestion / hearing or vision changes  NECK: No pain or stiffness. PULMONARY: no shortness of breath and cough ,wheezing, some chest discomfort with attempted coughing  Cardiovascular: denies palpitations or chest pain; no lower extremity edema  GASTROINTESTINAL: Denies abdominal pain, constipation, diarrhea, nausea, vomiting or melena / bloody stools  GENITOURINARY: No urinary frequency, urgency, hesitancy or dysuria. MUSCULOSKELETAL: no back / joint or muscle pain, no recent trauma. DERMATOLOGIC: denies pruritis, rashes or open areas   ENDOCRINE: No polyuria, polydipsia, no heat or cold intolerance. HEMATOLOGICAL: No anemia or easy bruising or bleeding.    NEUROLOGIC:  no focal weakness,  no speech changes     Objective:   Vital Signs:    Visit Vitals  BP (!) 105/54   Pulse 71   Temp 97.9 °F (36.6 °C)   Resp 17   Ht 5' 2\" (1.575 m)   Wt 55.8 kg (123 lb)   SpO2 100%   BMI 22.50 kg/m²       O2 Device: None (Room air)   O2 Flow Rate (L/min): 2 l/min   Temp (24hrs), Av.1 °F (36.7 °C), Min:97.9 °F (36.6 °C), Max:98.2 °F (36.8 °C)       Intake/Output:   Last shift:       07 -  1900  In: 0   Out: 140 [Urine:100]  Last 3 shifts: 1901 -  0700  In: 390 [P.O.:390]  Out: 1920 [Urine:1750]    Intake/Output Summary (Last 24 hours) at 7/22/2022 1016  Last data filed at 7/22/2022 0848  Gross per 24 hour   Intake 270 ml   Output 1310 ml   Net -1040 ml      Physical Exam:   General:  Alert, cooperative, no distress, appears stated age. Head:  Normocephalic, without obvious abnormality, atraumatic. Eyes:  Conjunctivae/corneas clear. PERRL, EOMs intact. Nose: Nares normal. Septum midline. Mucosa normal. No drainage or sinus tenderness. Throat: Lips, mucosa, and tongue normal. Teeth and gums normal.   Neck: Supple, symmetrical, trachea midline, no adenopathy, thyroid: no enlargment/tenderness/nodules, no carotid bruit and no JVD. Back:   Symmetric, no curvature. ROM normal.   Lungs:   Clear to auscultation bilaterally. With decreased intensity breath sounds at bases   Chest wall:  Sternal brace in place, chest tube on right   Heart:  Regular rate and rhythm, S1, S2 normal, no murmur, click, rub or gallop. Abdomen:   Soft, non-tender. Bowel sounds normal. No masses,  No organomegaly. Extremities: Extremities normal, atraumatic, no cyanosis or edema. Pulses: 2+ and symmetric all extremities.    Skin: Skin color, texture, turgor normal. No rashes or lesions   Lymph nodes: Cervical, supraclavicular, and axillary nodes normal.   Neurologic: Grossly nonfocal     Data review:     Recent Results (from the past 24 hour(s))   GLUCOSE, POC    Collection Time: 07/21/22 11:01 AM   Result Value Ref Range    Glucose (POC) 173 (H) 70 - 110 mg/dL   GLUCOSE, POC    Collection Time: 07/21/22 11:16 AM   Result Value Ref Range    Glucose (POC) 158 (H) 70 - 110 mg/dL   GLUCOSE, POC    Collection Time: 07/21/22  4:19 PM   Result Value Ref Range    Glucose (POC) 145 (H) 70 - 110 mg/dL   GLUCOSE, POC    Collection Time: 07/21/22 10:19 PM   Result Value Ref Range    Glucose (POC) 126 (H) 70 - 502 mg/dL   METABOLIC PANEL, BASIC    Collection Time: 07/22/22  5:34 AM   Result Value Ref Range    Sodium 137 136 - 145 mmol/L    Potassium 4.3 3.5 - 5.5 mmol/L    Chloride 104 100 - 111 mmol/L    CO2 27 21 - 32 mmol/L    Anion gap 6 3.0 - 18 mmol/L    Glucose 105 (H) 74 - 99 mg/dL    BUN 13 7.0 - 18 MG/DL    Creatinine 0.66 0.6 - 1.3 MG/DL    BUN/Creatinine ratio 20 12 - 20      GFR est AA >60 >60 ml/min/1.73m2    GFR est non-AA >60 >60 ml/min/1.73m2    Calcium 8.6 8.5 - 10.1 MG/DL   GLUCOSE, POC    Collection Time: 07/22/22  7:45 AM   Result Value Ref Range    Glucose (POC) 117 (H) 70 - 110 mg/dL       Imaging:  I have personally reviewed the patients radiographs and have reviewed the reports:  XR Results (most recent):  Results from Hospital Encounter encounter on 07/13/22    XR CHEST PORT    Narrative  EXAMINATION: PORTABLE CHEST RADIOGRAPH    CLINICAL INDICATION/HISTORY: please complete at 1 pm.  Assess for ptx with  clamped chest tube. > Additional: None. COMPARISON: 7 hours prior  FINDINGS:    Support Devices: Chest tube noted overlying the right middle chest, EKG leads in  place. Sternotomy wires noted. Surgical clips noted in the left mediastinum    Bones and Soft tissues: There are no acute osseous abnormalities. Bilateral  shoulder osteoarthritis noted. Heart and Mediastinum: The trachea and cardiomediastinal silhouette are within  normal limits. Lungs and Pleura: Likely small layering pleural effusions from prior radiograph  redemonstrated. Pneumothoraces, right greater than left noted in prior  radiograph largely unchanged. Impression  Small apical pneumothoraces, not significantly changed since prior. See  additional details above. Dictated by THERESA Desir MD, PGY-2    As the attending radiologist, I have assessed the study images and dictated or  reviewed/edited the final report as needed.     Chest imaging from 7/22/2022-right-sided chest tube in place    CT Results (most recent):  No results found for this or any previous visit.         07/13/22    ECHO ADULT COMPLETE 07/13/2022 7/13/2022    Interpretation Summary  Formatting of this result is different from the original.      Left Ventricle: Normal left ventricular systolic function with a visually estimated EF of 55 - 60%. Left ventricle size is normal. Normal wall thickness. Normal wall motion. Normal diastolic function. Mitral Valve: Mildly thickened leaflet. Mild regurgitation. Signed by: Vitor Vazquez MD on 7/13/2022  3:17 PM      Complex decision making was made in the evaluation and management plans during this consultation. More than 50% of time was spent in counseling and coordination of care including review of data and discussion with other team members. Carolyne Dc MD  Pulmonary & Critical Care      Please note that this dictation was completed with [employer], the computer voice recognition software. Quite often unanticipated grammatical, syntax, homophones, and other interpretive errors are inadvertently transcribed by the computer software. Please disregard these errors. Please excuse any errors that have escaped final proofreading.

## 2022-07-22 NOTE — PROGRESS NOTES
0700-received bedside report from off going [address] [facility], [facility]. Pt in bed resting quietly at this time. 0850-Pt assisted out of bed to toilet. Pt performed self care and oral care. Pt placed in recliner. 1030-pt ambulated in the hallway ~100 feet. Pt tolerated well and required a 1 minute break for shortness of breath. 1145-physical therapy at pt bedside. Pt ambulated in the hallway. 1335- TRANSFER - OUT REPORT:    Verbal report given to JUMANA Lopez ([facility]  [facility] transferred to CVT Stepdown room 2304(unit) for routine progression of care       Report consisted of patients Situation, Background, Assessment and   Recommendations(SBAR). Information from the following report(s) SBAR, Procedure Summary, Intake/Output, Recent Results, and Cardiac Rhythm NSR, PVC  was reviewed with the receiving nurse. Lines:   Peripheral IV 07/19/22 Posterior;Proximal;Right Forearm (Active)   Site Assessment Clean, dry, & intact 07/22/22 1200   Phlebitis Assessment 0 07/22/22 1200   Infiltration Assessment 0 07/22/22 1200   Dressing Status Clean, dry, & intact 07/22/22 1200   Dressing Type Transparent;Tape 07/22/22 1200   Hub Color/Line Status Pink;Capped 07/22/22 1200   Action Taken Open ports on tubing capped 07/22/22 1200   Alcohol Cap Used Yes 07/22/22 1200       Peripheral IV 07/19/22 Distal;Posterior;Right Forearm (Active)   Site Assessment Clean, dry, & intact 07/22/22 1200   Phlebitis Assessment 0 07/22/22 1200   Infiltration Assessment 0 07/22/22 1200   Dressing Status Clean, dry, & intact 07/22/22 1200   Dressing Type Transparent;Tape 07/22/22 1200   Hub Color/Line Status Pink;Capped 07/22/22 1200   Action Taken Open ports on tubing capped 07/22/22 1200   Alcohol Cap Used Yes 07/22/22 1200        Opportunity for questions and clarification was provided.       Patient transported with:   Registered Nurse

## 2022-07-23 ENCOUNTER — APPOINTMENT (OUTPATIENT)
Dept: GENERAL RADIOLOGY | Age: 62
DRG: 234 | End: 2022-07-23
Attending: PHYSICIAN ASSISTANT
Payer: COMMERCIAL

## 2022-07-23 LAB
GLUCOSE BLD STRIP.AUTO-MCNC: 105 MG/DL (ref 70–110)
GLUCOSE BLD STRIP.AUTO-MCNC: 106 MG/DL (ref 70–110)
GLUCOSE BLD STRIP.AUTO-MCNC: 127 MG/DL (ref 70–110)
GLUCOSE BLD STRIP.AUTO-MCNC: 132 MG/DL (ref 70–110)

## 2022-07-23 PROCEDURE — 94668 MNPJ CHEST WALL SBSQ: CPT

## 2022-07-23 PROCEDURE — 99232 SBSQ HOSP IP/OBS MODERATE 35: CPT | Performed by: HOSPITALIST

## 2022-07-23 PROCEDURE — 65660000004 HC RM CVT STEPDOWN

## 2022-07-23 PROCEDURE — 74011250637 HC RX REV CODE- 250/637: Performed by: PHYSICIAN ASSISTANT

## 2022-07-23 PROCEDURE — 99232 SBSQ HOSP IP/OBS MODERATE 35: CPT | Performed by: INTERNAL MEDICINE

## 2022-07-23 PROCEDURE — 97530 THERAPEUTIC ACTIVITIES: CPT

## 2022-07-23 PROCEDURE — 71045 X-RAY EXAM CHEST 1 VIEW: CPT

## 2022-07-23 PROCEDURE — 74011250636 HC RX REV CODE- 250/636: Performed by: PHYSICIAN ASSISTANT

## 2022-07-23 PROCEDURE — 97535 SELF CARE MNGMENT TRAINING: CPT

## 2022-07-23 PROCEDURE — 82962 GLUCOSE BLOOD TEST: CPT

## 2022-07-23 PROCEDURE — 2709999900 HC NON-CHARGEABLE SUPPLY

## 2022-07-23 PROCEDURE — 97168 OT RE-EVAL EST PLAN CARE: CPT

## 2022-07-23 PROCEDURE — 74011250637 HC RX REV CODE- 250/637: Performed by: INTERNAL MEDICINE

## 2022-07-23 PROCEDURE — 74011000250 HC RX REV CODE- 250: Performed by: PHYSICIAN ASSISTANT

## 2022-07-23 RX ADMIN — ASPIRIN 81 MG: 81 TABLET, COATED ORAL at 08:42

## 2022-07-23 RX ADMIN — SODIUM CHLORIDE, PRESERVATIVE FREE 10 ML: 5 INJECTION INTRAVENOUS at 14:10

## 2022-07-23 RX ADMIN — DOCUSATE SODIUM 100 MG: 100 CAPSULE, LIQUID FILLED ORAL at 08:44

## 2022-07-23 RX ADMIN — HYDROCODONE BITARTRATE AND ACETAMINOPHEN 1 TABLET: 5; 325 TABLET ORAL at 17:48

## 2022-07-23 RX ADMIN — FAMOTIDINE 20 MG: 20 TABLET ORAL at 08:42

## 2022-07-23 RX ADMIN — ATORVASTATIN CALCIUM 40 MG: 40 TABLET, FILM COATED ORAL at 21:34

## 2022-07-23 RX ADMIN — ENOXAPARIN SODIUM 40 MG: 100 INJECTION SUBCUTANEOUS at 17:41

## 2022-07-23 RX ADMIN — FAMOTIDINE 20 MG: 20 TABLET ORAL at 17:39

## 2022-07-23 RX ADMIN — CLOPIDOGREL BISULFATE 75 MG: 75 TABLET ORAL at 08:43

## 2022-07-23 RX ADMIN — CHLORHEXIDINE GLUCONATE 0.12% ORAL RINSE 10 ML: 1.2 LIQUID ORAL at 17:40

## 2022-07-23 RX ADMIN — SODIUM CHLORIDE, PRESERVATIVE FREE 10 ML: 5 INJECTION INTRAVENOUS at 06:28

## 2022-07-23 RX ADMIN — DOCUSATE SODIUM 100 MG: 100 CAPSULE, LIQUID FILLED ORAL at 17:39

## 2022-07-23 RX ADMIN — SODIUM CHLORIDE, PRESERVATIVE FREE 10 ML: 5 INJECTION INTRAVENOUS at 22:27

## 2022-07-23 RX ADMIN — CHLORHEXIDINE GLUCONATE 0.12% ORAL RINSE 10 ML: 1.2 LIQUID ORAL at 08:44

## 2022-07-23 RX ADMIN — GUAIFENESIN 600 MG: 600 TABLET ORAL at 08:42

## 2022-07-23 RX ADMIN — GUAIFENESIN 600 MG: 600 TABLET ORAL at 21:34

## 2022-07-23 NOTE — PROGRESS NOTES
Bedside and Verbal shift change report given to Dax Renee (oncoming nurse) by Adrien Elliott RN (offgoing nurse). Report included the following information SBAR, Kardex, Intake/Output, MAR, and Cardiac Rhythm NSR .       Wound Prevention Checklist    Patient: Tamara Wells (34 y.o. female)  Date: 7/23/2022  Diagnosis: NSTEMI (non-ST elevated myocardial infarction) ([facility] [address] [I21.4] S/P CABG x 3    Precautions: Fall, Sternal       []  Heel prevention boots placed on patient    []  Patient turned q2h during shift    []  Lift team ordered    []  Patient on [place] bed/Specialty bed    [x]  Each Wound is documented during shift (Stage, Color, drainage, odor, measurements, and dressings)    [x]  Dual skin checks done at bedside during shift report with Gali Reno RN

## 2022-07-23 NOTE — PROGRESS NOTES
07/22/22 0632   Chest Physiotherapy Tx   CPT Subsequent Treatment Other(comment)  (Sleeping not done at this time)       Patient sleeping. Leandra Danielle not done at this time.     Noted IS and Leandra Danielle are at patient's bedside within reach

## 2022-07-23 NOTE — PROGRESS NOTES
1920 Bedside and Verbal shift change  Received from Fely Lorenz, RN (outgoing nurse), to L. Sherwin Klinefelter (oncoming)  Pt. Is AOX 4. IV SL, Pt. denies  pain at this time. Report included the following information     SBAR, Kardex, Procedure Summary, Intake/Output, MAR, Recent Lab Results, and  Cardiac Rhythm @ SR. Will resume care and monitor Pt. Condition. Pt. Educated on call bell when in need of help and assistance. Pt. verbalized understanding. 1920 Pt. Head to toe Assessment Done and documented. Pt. On reclining chair. Pt. Denies discomfort. 2020  Pt. Back to bed, chest tube in place. Pt. Made no complaints. 2130  Pt. Denies pain. 2245  Pt. OOB to [facility] to void, Assisted back to bed. On left side. 0000  Pt. Made no complaints, resting in bed comfortably. 0200  Pt. resting comfortably in bed.    0400 Pt. Denies pain. Pt given chlorhexidene wipes, pt. Able to wipe private parts, changed pads and gown. 0600  Pt. Able to rest and sleep well throughout the shift. Verbal and bedside Shift changed report given to Chris Bazzi (oncoming RN) on Pt. Condition. Report consisted of patients Situation, History, Activities, intake/output,  Background, Assessment and Recommendations(SBAR). Information from the following report(s) Kardex, order Summary, Lab results and MAR was reviewed with the receiving nurse. Opportunity for questions and clarification was provided.

## 2022-07-23 NOTE — PROGRESS NOTES
Problem: Pressure Injury - Risk of  Goal: *Prevention of pressure injury  Description: Document Vargas Scale and appropriate interventions in the flowsheet. Outcome: Progressing Towards Goal  Note: Pressure Injury Interventions:  Sensory Interventions: Assess need for specialty bed, Minimize linen layers, Keep linens dry and wrinkle-free    Moisture Interventions: Absorbent underpads    Activity Interventions: Pressure redistribution bed/mattress(bed type), PT/OT evaluation    Mobility Interventions: Pressure redistribution bed/mattress (bed type), PT/OT evaluation    Nutrition Interventions: Document food/fluid/supplement intake    Friction and Shear Interventions: Minimize layers                Problem: Patient Education: Go to Patient Education Activity  Goal: Patient/Family Education  Outcome: Progressing Towards Goal     Problem: Falls - Risk of  Goal: *Absence of Falls  Description: Document Khadra Fall Risk and appropriate interventions in the flowsheet. Outcome: Progressing Towards Goal  Note: Fall Risk Interventions:  Mobility Interventions: Patient to call before getting OOB, Bed/chair exit alarm, Communicate number of staff needed for ambulation/transfer         Medication Interventions: Bed/chair exit alarm, Patient to call before getting OOB    Elimination Interventions: Bed/chair exit alarm, Call light in reach    History of Falls Interventions: Bed/chair exit alarm, Door open when patient unattended         Problem: Falls - Risk of  Goal: *Absence of Falls  Description: Document Alverto [place] Fall Risk and appropriate interventions in the flowsheet.   Outcome: Progressing Towards Goal  Note: Fall Risk Interventions:  Mobility Interventions: Patient to call before getting OOB, Bed/chair exit alarm, Communicate number of staff needed for ambulation/transfer         Medication Interventions: Bed/chair exit alarm, Patient to call before getting OOB    Elimination Interventions: Bed/chair exit alarm, Call light in reach    History of Falls Interventions: Bed/chair exit alarm, Door open when patient unattended         Problem: Patient Education: Go to Patient Education Activity  Goal: Patient/Family Education  Outcome: Progressing Towards Goal     Problem: Nutrition Deficit  Goal: *Optimize nutritional status  Outcome: Progressing Towards Goal     Problem: Patient Education: Go to Patient Education Activity  Goal: Patient/Family Education  Outcome: Progressing Towards Goal     Problem: CABG: Post-Op Day 2/Transfer Day  Goal: Off Pathway (Use only if patient is Off Pathway)  Outcome: Progressing Towards Goal  Goal: Activity/Safety  Outcome: Progressing Towards Goal  Goal: Consults, if ordered  Outcome: Progressing Towards Goal  Goal: Diagnostic Test/Procedures  Outcome: Progressing Towards Goal  Goal: Nutrition/Diet  Outcome: Progressing Towards Goal  Goal: Medications  Outcome: Progressing Towards Goal  Goal: Discharge Planning  Outcome: Progressing Towards Goal  Goal: Respiratory  Outcome: Progressing Towards Goal  Goal: Treatments/Interventions/Procedures  Outcome: Progressing Towards Goal  Goal: Psychosocial  Outcome: Progressing Towards Goal  Goal: *Hemodynamically stable without vasoactive medications  Outcome: Progressing Towards Goal  Goal: *Lungs clear or at baseline  Outcome: Progressing Towards Goal  Goal: *Optimal pain control at patient's stated goal  Outcome: Progressing Towards Goal  Goal: *Demonstrates progressive activity  Outcome: Progressing Towards Goal  Goal: *Tolerating diet  Outcome: Progressing Towards Goal     Problem: CABG: Post-Op Day 3  Goal: Off Pathway (Use only if patient is Off Pathway)  Outcome: Progressing Towards Goal  Goal: Activity/Safety  Outcome: Progressing Towards Goal  Goal: Consults, if ordered  Outcome: Progressing Towards Goal  Goal: Diagnostic Test/Procedures  Outcome: Progressing Towards Goal  Goal: Nutrition/Diet  Outcome: Progressing Towards Goal  Goal: Discharge Planning  Outcome: Progressing Towards Goal  Goal: Medications  Outcome: Progressing Towards Goal  Goal: Respiratory  Outcome: Progressing Towards Goal  Goal: Treatments/Interventions/Procedures  Outcome: Progressing Towards Goal  Goal: Psychosocial  Outcome: Progressing Towards Goal  Goal: *Hemodynamically stable  Outcome: Progressing Towards Goal  Goal: *Lungs clear or at baseline  Outcome: Progressing Towards Goal  Goal: *Optimal pain control at patient's stated goal  Outcome: Progressing Towards Goal  Goal: *Incisions without signs and symptoms of wound complication  Outcome: Progressing Towards Goal  Goal: *Demonstrates progressive activity  Outcome: Progressing Towards Goal  Goal: *Tolerating diet  Outcome: Progressing Towards Goal

## 2022-07-23 NOTE — PROGRESS NOTES
Hospitalist Progress Note    Patient: Carmel Ladd MRN: [id number]  [facility]: [id number]    YOB: 1960  Age: 57 y.o. Sex: female    DOA: 7/13/2022 LOS:  LOS: 10 days            Assessment/Plan     Principal Problem:    S/P CABG x 3 (7/16/2022)      Overview: Dr. Jaspreet Winters, 7/15/2022:  Triple Coronary Artery Bypass Surgery with LIMA to       LAD, Reverse Saphenous Vein Grafts to the Diagonal and Obtuse Marginal       Coronary Arteries    Active Problems:    NSTEMI (non-ST elevated myocardial infarction) ([facility] [address] (7/13/2022)  Coronary artery disease status post CABG: Patient who has a pneumothorax and a also has a chest tube that is in place and a CT surgery has been following for this and now will be followed by pulmonary critical care for the chest tube. Continue medical management. Aspirin, Lipitor, Plavix. Interval history: 24-year-old female presented to the emergency room with complaints of chest pain at [facility] and was noted to have elevated troponin and was started on heparin drip and NSTEMI was diagnosed. .    Patient underwent CABG and subsequently later on noticed to have pneumothorax and had chest tubes placed and has been monitored by CT surgery. They were not able to remove the last chest tube due to some air leak. CT surgery will not be available until Sunday evening and so the patient is transferred to the medicine service. 7/22-no new events overnight, pulmonology consulted, advised to continue chest tube to waterseal.  Continue clamping trials until able to tolerate for successful removal.  Patient seen by PT OT, recommending home with home health    7/23-no new events overnight, pulmonology on board for chest tube management. Continue current treatment plan.       Vital signs/Intake and Output:  Visit Vitals  /65   Pulse 75   Temp 98.5 °F (36.9 °C)   Resp 18   Ht 5' 2\" (1.575 m)   Wt 55.8 kg (123 lb)   SpO2 97%   BMI 22.50 kg/m²     Current Shift:  07/23 0701 - 07/23 1900  In: 480 [P.O.:480]  Out: 100   Last three shifts:  07/21 1901 - 07/23 0700  In: 120 [P.O.:120]  Out: 2280 [Urine:2100]      Medications Reviewed      Labs: Results:       Chemistry Recent Labs     07/22/22  0534 07/21/22  0505   * 99    137   K 4.3 3.9    103   CO2 27 29   BUN 13 18   CREA 0.66 0.67   CA 8.6 8.2*   AGAP 6 5   BUCR 20 27*        CBC w/Diff Recent Labs     07/21/22  0505   WBC 7.4   RBC 2.53*   HGB 7.8*   HCT 24.2*      GRANS 65   LYMPH 17*   EOS 2        Cardiac Enzymes No results for input(s): CPK, CKND1, GAYLE in the last 72 hours. No lab exists for component: CKRMB, TROIP   Coagulation No results for input(s): PTP, INR, APTT, INREXT, INREXT in the last 72 hours. Lipid Panel Lab Results   Component Value Date/Time    Cholesterol, total 127 07/14/2022 05:39 AM    HDL Cholesterol 43 07/14/2022 05:39 AM    LDL, calculated 73.8 07/14/2022 05:39 AM    VLDL, calculated 10.2 07/14/2022 05:39 AM    Triglyceride 51 07/14/2022 05:39 AM    CHOL/HDL Ratio 3.0 07/14/2022 05:39 AM      BNP No results for input(s): BNPP in the last 72 hours. Liver Enzymes No results for input(s): TP, ALB, TBIL, AP in the last 72 hours.     No lab exists for component: SGOT, GPT, DBIL   Thyroid Studies Lab Results   Component Value Date/Time    TSH 0.68 07/13/2022 04:52 PM        Procedures/imaging: see electronic medical records for all procedures/Xrays and details which were not copied into this note but were reviewed prior to creation of Plan            July 23, 2022  Quintin Zacarias MD

## 2022-07-23 NOTE — PROGRESS NOTES
Problem: Self Care Deficits Care Plan (Adult)  Goal: *Acute Goals and Plan of Care (Insert Text)  Description: Occupational Therapy Goals  Initiated 7/17/2022 within 7 day(s). Re-evaluated 7/23/2022, pt is making great progress, met mult goals, goal updated. Continue with remaining goals until met. 1.  Patient will perform grooming with supervision/set-up standing at the sink for >3 min with Good balance. (Met/DC)  2.  Patient will perform upper body dressing with supervision/set-up. 3.  Patient will perform lower body dressing with supervision/set-up. 4.  Patient will perform toilet transfers with modified independence. 5.  Patient will perform all aspects of toileting with supervision/set-up (Met/DC)  6.  Patient will recall 100% of sternal precautions and will follow them during ADLs w/o cues. 7.  Patient will utilize energy conservation techniques during functional activities w/o cues. Prior Level of Function: Pt reports being Mod Ind for ADLs and functional mobility. Pt will be staying with her daughter upon DC. Outcome: Progressing Towards Goal  OCCUPATIONAL THERAPY RE-EVALUATION    Patient: Tamara Wells (30 y.o. female)  Date: 7/23/2022  Primary Diagnosis: NSTEMI (non-ST elevated myocardial infarction) ([facility] [address] [I21.4]  Procedure(s) (LRB):  CORONARY ARTERY BYPASS GRAFT (CABG) TIMES THREE (N/A) 8 Days Post-Op   Precautions:   Fall, Sternal    ASSESSMENT :  Based on the objective data described below, the patient presents with great progress towards goals, meeting mult goals, goals updated. Pt demos good safety awareness during functional tasks, and Good sitting and standing balance during ADLs. Pt benefits from VCs for following her sternal precautions during ADLs and would benefit from continuous practice of UB/LB ADLs within sternal precautions to ensure safety at home. Pt reports her daughter forgot to bring her clothes and will bring them tomorrow.     Patient will benefit from skilled intervention to address the above impairments. Patient's rehabilitation potential is considered to be Good  Factors which may influence rehabilitation potential include:   []             None noted  []             Mental ability/status  [x]             Medical condition  []             Home/family situation and support systems  []             Safety awareness  []             Pain tolerance/management  []             Other:      PLAN :  Recommendations and Planned Interventions:   [x]               Self Care Training                  [x]      Therapeutic Activities  [x]               Functional Mobility Training   []      Cognitive Retraining  []               Therapeutic Exercises           [x]      Endurance Activities  []               Balance Training                    []      Neuromuscular Re-Education  []               Visual/Perceptual Training     [x]      Home Safety Training  [x]               Patient Education                   [x]      Family Training/Education  []               Other (comment):    Frequency/Duration: Patient will be followed by occupational therapy 1-2 times per day/3-5 days per week to address goals. Discharge Recommendations: Home Health  Further Equipment Recommendations for Discharge: shower chair    [facility]: Ora Gaona scored a 20/24 on the AM-PAC ADL Inpatient form. Current research shows that an AM-PAC score of 18 or greater is associated with a discharge to the patient's home setting. Based on the patient's AM-PAC score and their current ADL deficits, it is recommended that the patient have 2-3 sessions per week of Occupational Therapy at d/c to increase the patient's independence. Please see assessment section for further patient specific details. SUBJECTIVE:   Patient stated I want to do everything right.     OBJECTIVE DATA SUMMARY:   Hospital course since last seen and reason for reevaluation: Pt is due for OT re-evaluation.  Pt appears to be benefiting from receiving OT services during this hospitalization stay. Pt is making steady progress toward goals but continues to be limited by decreased endurance. OT will continue to follow the patient for further intervention during this hospitalization, in order to maximize ADL performance and overall functional independence. Past Medical History:   Diagnosis Date    Arthritis      Past Surgical History:   Procedure Laterality Date    HX PARTIAL HYSTERECTOMY       Barriers to Learning/Limitations: None  Compensate with: visual, verbal, tactile, kinesthetic cues/model    Home Situation:   Home Situation  Home Environment: Apartment  # Steps to Enter: 12 (2nd floor )  Rails to Enter: Yes  Wheelchair Ramp: No  One/Two Story Residence: Two story  Living Alone: No  Support Systems: Child(randall)  Patient Expects to be Discharged to[de-identified] Home with family assistance  Current DME Used/Available at Home: None  Tub or Shower Type: Tub/Shower combination  []  Right hand dominant   []  Left hand dominant    Cognitive/Behavioral Status:  Neurologic State: Alert  Orientation Level: Oriented X4  Cognition: Follows commands  Safety/Judgement: Awareness of environment; Fall prevention;Home safety    Skin: visible skin intact  Edema:none noted    Vision/Perceptual:       Acuity: Able to read clock/calendar on wall without difficulty     Coordination: BUE  Coordination: Generally decreased, functional  Fine Motor Skills-Upper: Left Intact; Right Intact    Gross Motor Skills-Upper: Left Intact; Right Intact    Balance:  Sitting: Intact  Standing: Intact; With support    Strength: BUE  Strength: Generally decreased, functional (tested within sternal precautions)   Tone & Sensation: BUE  Tone: Normal  Sensation: Intact   Range of Motion: BUE  AROM: Generally decreased, functional (tested within sternal precautions)     Functional Mobility and Transfers for ADLs:  Transfers:  Sit to Stand: Supervision   Toilet Transfer : Supervision    Bathroom Mobility: Supervision/set up    ADL Assessment:   Feeding: Setup  Oral Facial Hygiene/Grooming: Supervision  Bathing: Contact guard assistance  Upper Body Dressing: Contact guard assistance  Lower Body Dressing: Stand-by assistance  Toileting: Modified independent   ADL Intervention:     Cognitive Retraining  Safety/Judgement: Awareness of environment; Fall prevention;Home safety    Pain:  Pain level pre-treatment: 0/10   Pain level post-treatment: 0/10    Activity Tolerance:   Fair  Please refer to the flowsheet for vital signs taken during this treatment. After treatment:   [x] Patient left in no apparent distress sitting up in chair  [] Patient left in no apparent distress in bed  [x] Call bell left within reach  [x] Nursing notified  [] Caregiver present  [] Bed alarm activated    COMMUNICATION/EDUCATION:   [x] Role of Occupational Therapy in the acute care setting  [x] Home safety education was provided and the patient/caregiver indicated understanding. [x] Patient/family have participated as able in goal setting and plan of care. [x] Patient/family agree to work toward stated goals and plan of care. [] Patient understands intent and goals of therapy, but is neutral about his/her participation. [] Patient is unable to participate in goal setting and plan of care. Thank you for this referral.  Altagracia Vidal OTR/L  Time Calculation: 24 mins    [facility] AM-PAC® Daily Activity Inpatient Short Form (6-Clicks)*    How much HELP from another person does the patient currently need    (If the patient hasn't done an activity recently, how much help from another person do you think he/she would need if he/she tried?)   Total (Total A or Dep)   A Lot  (Mod to Max A)   A Little (Sup or Min A)   None (Mod I to I)   Putting on and taking off regular lower body clothing? [] 1 [] 2 [x] 3 [] 4   2. Bathing (including washing, rinsing,      drying)? [] 1 [] 2 [x] 3 [] 4   3.  Toileting, which includes using toilet, bedpan or urinal?   [] 1 [] 2 [x] 3 [] 4   4. Putting on and taking off regular upper body clothing? [] 1 [] 2 [x] 3 [] 4   5. Taking care of personal grooming such as brushing teeth? [] 1 [] 2 [] 3 [x] 4   6. Eating meals? [] 1 [] 2 [] 3 [x] 4      20/24    Current research shows that an AM-PAC score of 18 or greater is associated with a discharge to the patient's home setting. Based on the patient's AM-PAC score and their current ADL deficits, it is recommended that the patient have 2-3 sessions per week of Occupational Therapy at d/c to increase the patient's independence. Please see assessment section for further patient specific details.

## 2022-07-23 NOTE — RT PROTOCOL NOTE
RESPIRATORY CARE ASSESSMENT FOR BRONCHIAL HYGIENE OR LUNG EXPANSION THERAPY  Patient  Kitty Randhawa     57 y.o.   female     7/23/2022  12:14 AM  Patient Active Problem List   Diagnosis Code    NSTEMI (non-ST elevated myocardial infarction) ([facility] [address] I21.4    S/P CABG x 3 Z95.1       ABG:  Date:7/23/2022  No results found for: PH, PHI, PCO2, PCO2I, PO2, PO2I, HCO3, HCO3I, FIO2, FIO2I    Chest X-ray:  Date:7/23/2022  Results from Hospital Encounter encounter on 07/13/22    XR CHEST PORT    Narrative  EXAMINATION: PORTABLE CHEST RADIOGRAPH    CLINICAL INDICATION/HISTORY: please complete at 1 pm.  Assess for ptx with  clamped chest tube. > Additional: None. COMPARISON: 7 hours prior  FINDINGS:    Support Devices: Chest tube noted overlying the right middle chest, EKG leads in  place. Sternotomy wires noted. Surgical clips noted in the left mediastinum    Bones and Soft tissues: There are no acute osseous abnormalities. Bilateral  shoulder osteoarthritis noted. Heart and Mediastinum: The trachea and cardiomediastinal silhouette are within  normal limits. Lungs and Pleura: Likely small layering pleural effusions from prior radiograph  redemonstrated. Pneumothoraces, right greater than left noted in prior  radiograph largely unchanged. Impression  Small apical pneumothoraces, not significantly changed since prior. See  additional details above. Dictated by THERESA Foster MD, PGY-2    As the attending radiologist, I have assessed the study images and dictated or  reviewed/edited the final report as needed.       Lab Test:  Date:7/23/2022  WBC:   Lab Results   Component Value Date/Time    WBC 7.4 07/21/2022 05:05 AM   HGB:   Lab Results   Component Value Date/Time    HGB 7.8 (L) 07/21/2022 05:05 AM    PLTS:   Lab Results   Component Value Date/Time    PLATELET 256 49/90/0676 05:05 AM       SaO2%/flow: @QJUPCFB2(2)@    Vital Signs:   Patient Vitals for the past 8 hrs:   Temp Pulse Resp BP SpO2   07/23/22 0001 99.3 °F (37.4 °C) 73 18 (!) 104/56 100 %   07/22/22 2159 -- -- -- -- 98 %   07/22/22 2000 98 °F (36.7 °C) 74 20 (!) 103/58 98 %         RA/O2 flow/device:RA        First Inital Assesment:     [x]Yes []No   Reevaluation/Reassessment:    []Yes []No     CHART REVIEW   Points 0 X 1 X 2 X 3 X 4 X Points   Pulmonary History Smoking History (1) none  Recent Smoking History <1 PPD  Recent Smoking History >1 PPD X Pulmonary Disease or Impairment  Severe Pulmonary Disease  2   Surgical History No Surgery  General Surgery  Lower Abdominal  Thoracic or Upper Abdominal X Thoracic & Pulmonary Disease  3   CXR Clear or not indicated  Chronic changes or CXR Pending  Infiltrate, atelectasis or pleural effusions X Infiltrates in more than 1lobe  Infiltrates +atelectasis  +/or pleural effusions  2     PATIENT ASSESSMENT    0 X 1 X 2 X 3 X 4 X Points   Respiratory Pattern Regular pattern RR 12-20 X Increased RR 21-27   Mild Dyspnea at rest, irregular pattern RR 28-32  Moderate Dyspnea at rest, Use of accessory muscles, RR 33-36  Severe Dyspnea, Use of accessory muscles RR >36  0   Mental Status Alert Oriented cooperative X Confused, Follows commands  Lethargic, Does not follow commands  Obtunded  Unresponsive  0   Breath Sounds Clear  Decreased Unilaterally  Decreased Bilaterally X Mild Scattered wheezing or Crackles in bases  Severe Wheezing or rhonchi  2   Cough Strong dry NPC  Strong Productive  Weak NPC  Weak productive or weak with rhonchi  No cough or may require suctioning  1   Level of Activity Ambulatory  Ambulatory with assistance  Temporarily Non-ambulatory X Non-Ambulatory, able to position self  Unable to position self, confined to bed  2   Total Points/Score:   12     Specific Intervention Chart(AEROBIKA & IS)    Bronchial Hygiene/Secretion Clearance:    []EZPAP []Rotation bed with vibration    []CPT with percussor []CPT via vest   []Oscillastiang positive pressure expiratory device      Lung Expansion:    []Incentive Spirometer w/RT visits [x]Incentive Spirometer w/nursing    []EZPAP [] Metaneb     *Suctioning:    []Nasal Tracheal []Tracheal     *suctioning will be ordered and done PRN with an associated frequency such as QID/PRN based on score       Other:    Care Plan   Level # Score Modality Frequency Comment   Level 1 >17 / /    Level 2 14-17 / /    Level 3 10-13 PEP TID    Level 4 1-9 / /      BRONCHIAL HYGIENE SCORING AND FREQUENCY GUIDELINES   Frequency Indications/Findings Level #   Q4 ATC Copious secretions, SOB, unable to sleep 1   QID & PRN at night Moderate amounts of secretions 2   TID or Q6 wa Small amounts of secretions and poor cough: recent history of secretions 3   BID or Q8 wa Unable to deep breathe and cough effectively 4        Comments:  AEROBIKA TID    Respiratory Therapist: Peace Kebede, RT

## 2022-07-23 NOTE — CONSULTS
[facility]  Pulmonary, Critical Care, and Sleep Medicine    Initial Patient Referral report    Name: Gissell Hernandez MRN: [id number]   : 1960 Hospital: [address] [place] [address] Dr   Date: 2022        IMPRESSION:   Right pneumothorax with failed clamping trial necessitating continuation of chest tube. Bilateral chest tubes were placed intraoperatively with successful removal of left-sided chest tube. Imaging does not show any significant pleural effusion, small apical pneumothorax likely. S/p CABG x3-LIMA to LAD, SVG-diagonal, SVG-OM on 7/15/2022  S/p NSTEMI  History of tobacco use-20-pack-year smoking      RECOMMENDATIONS:     Continue chest tube to waterseal.  Will follow-up imaging, clamping trials until able to tolerate for successful removal.- defer to CT surgery  Bronchial hygiene protocol  Continue current medical management per primary team  Aspiration precautions  OT, PT, OOB and ambulate  Will Follow for chest tube monitoring and management until cardiothoracic services available  DVT, PUD prophylaxis     Subjective: This patient has been seen and evaluated at the request of  Nancy BUTTERFIELD for chest tube management. Patient is a 57 y.o. female had initially presented to the ER with complaints of chest pain at [facility] and noted to have elevated troponin. She was started on heparin drip initially and after subsequent evaluation underwent coronary artery bypass grafting-triple-vessel LIMA to LAD, reverse saphenous vein grafts to diagonal and obtuse marginal coronary arteries. Patient postop has been recovering but noted to have a right pneumothorax needing chest tube placement. Patient has been ambulating, denies any complaints of chest pain except when she tries to cough hard. She is making all efforts at coughing and deep breathing and states that she is able to expectorate mucus with some difficulty. She is requiring a sternal brace.   Chest tube could not be removed after a clamping trial and left in place. Overnight she was left on waterseal and today does not have any significant leak. Pulmonology was requested to see the patient for chest tube management since there will be a gap in cardiothoracic coverage for next 2 days. Her left-sided chest tube and mediastinal drains were successfully removed. Her initial postop course was complicated by bradycardia arrhythmias which resolved after discontinuation of amiodarone  She required blood transfusion. No other complicating issues noted    I have reviewed all of the available data including the patient's previous history external records and radiological imaging available for review. In addition applicable cardiology and other lab data were also reviewed.       Interval evaluation:  07/23/22  LOS:10    Patient remains with right CT on water seal  She is on room air and was walking with PT in the hallway this morning  She has some chest wall discomfort from surgical sites - but this is improving, she is wearing her brace  She is tolerating PO, no nausea or emesis  No wheezing, no sputum, no hemoptyis    Patient Vitals for the past 24 hrs:   Temp Pulse Resp BP SpO2   07/23/22 1617 98.5 °F (36.9 °C) 77 18 125/68 96 %   07/23/22 1151 98.5 °F (36.9 °C) 75 18 107/65 97 %   07/23/22 0741 98.6 °F (37 °C) 72 18 116/69 94 %   07/23/22 0353 98.4 °F (36.9 °C) 70 18 128/72 94 %   07/23/22 0001 99.3 °F (37.4 °C) 73 18 (!) 104/56 100 %   07/22/22 2159 -- -- -- -- 98 %   07/22/22 2000 98 °F (36.7 °C) 74 20 (!) 103/58 98 %     Inpat Anti-Infectives (From admission, onward)       Start     Ordered Stop    07/15/22 0714  vancomycin (VANCOCIN) 1,000 mg injection        Note to Pharmacy: Lesa Zamarripa: cabinet override    07/15/22 0714 07/15/22 1959                      Past Medical History:   Diagnosis Date    Arthritis       Past Surgical History:   Procedure Laterality Date    HX PARTIAL HYSTERECTOMY        Prior to Admission medications    Medication Sig Start Date End Date Taking? Authorizing Provider   ibuprofen (MOTRIN) 600 mg tablet Take 1 Tab by mouth every six (6) hours as needed for Pain. 1/1/20   Tamra Catherine PA-C   cyclobenzaprine (FLEXERIL) 10 mg tablet Take 1 Tab by mouth three (3) times daily as needed for Muscle Spasm(s). 1/1/20   Tamra Catherine PA-C   acetaminophen (TYLENOL) 325 mg tablet Take 2 Tabs by mouth every four (4) hours as needed for Pain. 10/11/17   Main Regalado MD     No Known Allergies   Social History     Tobacco Use    Smoking status: Every Day     Packs/day: 0.50     Types: Cigarettes    Smokeless tobacco: Not on file   Substance Use Topics    Alcohol use: No      Family History   Problem Relation Age of Onset    Heart Disease Maternal Grandmother     Heart Attack Daughter       Current Facility-Administered Medications   Medication Dose Route Frequency    clopidogreL (PLAVIX) tablet 75 mg  75 mg Oral DAILY    guaiFENesin ER (MUCINEX) tablet 600 mg  600 mg Oral Q12H    insulin lispro (HUMALOG) injection   SubCUTAneous AC&HS    sodium chloride (NS) flush 5-40 mL  5-40 mL IntraVENous Q8H    [Held by provider] metoprolol tartrate (LOPRESSOR) tablet 12.5 mg  12.5 mg Oral Q12H    aspirin delayed-release tablet 81 mg  81 mg Oral DAILY    chlorhexidine (PERIDEX) 0.12 % mouthwash 10 mL  10 mL Oral BID    docusate sodium (COLACE) capsule 100 mg  100 mg Oral BID    enoxaparin (LOVENOX) injection 40 mg  40 mg SubCUTAneous Q24H    famotidine (PEPCID) tablet 20 mg  20 mg Oral BID    nicotine (NICODERM CQ) 14 mg/24 hr patch 1 Patch  1 Patch TransDERmal DAILY    atorvastatin (LIPITOR) tablet 40 mg  40 mg Oral QHS         REVIEW OF SYSTEMS:       [x]Total of 12 systems reviewed as follows:     GENERAL:no fever and chills   HEENT: no sinus congestion / hearing or vision changes  NECK: No pain or stiffness.    PULMONARY: no shortness of breath and cough ,wheezing, some chest discomfort with attempted coughing  Cardiovascular: denies palpitations or chest pain; no lower extremity edema  GASTROINTESTINAL: Denies abdominal pain, constipation, diarrhea, nausea, vomiting or melena / bloody stools  GENITOURINARY: No urinary frequency, urgency, hesitancy or dysuria. MUSCULOSKELETAL: no back / joint or muscle pain, no recent trauma. DERMATOLOGIC: denies pruritis, rashes or open areas   ENDOCRINE: No polyuria, polydipsia, no heat or cold intolerance. HEMATOLOGICAL: No anemia or easy bruising or bleeding. NEUROLOGIC:  no focal weakness,  no speech changes     Objective:   Vital Signs:    Visit Vitals  /69   Pulse 72   Temp 98.6 °F (37 °C)   Resp 18   Ht 5' 2\" (1.575 m)   Wt 55.8 kg (123 lb)   SpO2 94%   BMI 22.50 kg/m²       O2 Device: None (Room air)   O2 Flow Rate (L/min): 2 l/min   Temp (24hrs), Av.3 °F (36.8 °C), Min:97.3 °F (36.3 °C), Max:99.3 °F (37.4 °C)       Intake/Output:   Last shift:       07 -  1900  In: 240 [P.O.:240]  Out: -   Last 3 shifts:  190 -  0700  In: 120 [P.O.:120]  Out: 2280 [Urine:2100]    Intake/Output Summary (Last 24 hours) at 2022 0912  Last data filed at 2022 5299  Gross per 24 hour   Intake 360 ml   Output 1360 ml   Net -1000 ml        Physical Exam:   General:  Alert, cooperative, no distress, appears stated age. Head:  Normocephalic, without obvious abnormality, atraumatic. Eyes:  Conjunctivae/corneas clear. PERRL, EOMs intact. Nose: Nares normal. Septum midline. Mucosa normal. No drainage or sinus tenderness. Throat: Lips, mucosa, and tongue normal. Teeth and gums normal.   Neck: Supple, symmetrical, trachea midline, no adenopathy, thyroid: no enlargment/tenderness/nodules, no carotid bruit and no JVD. Back:   Symmetric, no curvature. ROM normal.   Lungs:   Clear to auscultation bilaterally.   With decreased intensity breath sounds at bases   Chest wall:  Sternal brace in place, chest tube on right   Heart:  Regular rate and rhythm, S1, S2 normal, no murmur, click, rub or gallop. Abdomen:   Soft, non-tender. Bowel sounds normal. No masses,  No organomegaly. Extremities: Extremities normal, atraumatic, no cyanosis or edema. Pulses: 2+ and symmetric all extremities. Skin: Skin color, texture, turgor normal. No rashes or lesions   Lymph nodes: Cervical, supraclavicular, nodes are unremarkable   Neurologic: Grossly nonfocal     Data review:     Recent Results (from the past 24 hour(s))   GLUCOSE, POC    Collection Time: 07/22/22 11:32 AM   Result Value Ref Range    Glucose (POC) 114 (H) 70 - 110 mg/dL   GLUCOSE, POC    Collection Time: 07/22/22  3:29 PM   Result Value Ref Range    Glucose (POC) 108 70 - 110 mg/dL   GLUCOSE, POC    Collection Time: 07/22/22  9:12 PM   Result Value Ref Range    Glucose (POC) 121 (H) 70 - 110 mg/dL   GLUCOSE, POC    Collection Time: 07/23/22  7:43 AM   Result Value Ref Range    Glucose (POC) 106 70 - 110 mg/dL       Imaging:  I have personally reviewed the patients radiographs and have reviewed the reports:  XR Results (most recent):  Results from Hospital Encounter encounter on 07/13/22    XR CHEST PORT    Narrative  EXAMINATION: PORTABLE CHEST RADIOGRAPH    CLINICAL INDICATION/HISTORY: please complete at 1 pm.  Assess for ptx with  clamped chest tube. > Additional: None. COMPARISON: 7 hours prior  FINDINGS:    Support Devices: Chest tube noted overlying the right middle chest, EKG leads in  place. Sternotomy wires noted. Surgical clips noted in the left mediastinum    Bones and Soft tissues: There are no acute osseous abnormalities. Bilateral  shoulder osteoarthritis noted. Heart and Mediastinum: The trachea and cardiomediastinal silhouette are within  normal limits. Lungs and Pleura: Likely small layering pleural effusions from prior radiograph  redemonstrated. Pneumothoraces, right greater than left noted in prior  radiograph largely unchanged.     Impression  Small apical pneumothoraces, not significantly changed since prior. See  additional details above. Dictated by THERESA Serrano MD, PGY-2    As the attending radiologist, I have assessed the study images and dictated or  reviewed/edited the final report as needed. Chest imaging from 7/22/2022-right-sided chest tube in place    CT Results (most recent):  No results found for this or any previous visit.         07/13/22    ECHO ADULT COMPLETE 07/13/2022 7/13/2022    Interpretation Summary  Formatting of this result is different from the original.      Left Ventricle: Normal left ventricular systolic function with a visually estimated EF of 55 - 60%. Left ventricle size is normal. Normal wall thickness. Normal wall motion. Normal diastolic function. Mitral Valve: Mildly thickened leaflet. Mild regurgitation. Signed by: Derek Lerma MD on 7/13/2022  3:17 PM      Complex decision making was made in the evaluation and management plans during this consultation. More than 50% of time was spent in counseling and coordination of care including review of data and discussion with other team members.               Lilliam Woodward DO, [facility]    [facility]  Pulmonary, Critical Care, and Sleep Medicine

## 2022-07-23 NOTE — PROGRESS NOTES
Problem: Pressure Injury - Risk of  Goal: *Prevention of pressure injury  Description: Document Vargas Scale and appropriate interventions in the flowsheet. Outcome: Progressing Towards Goal  Note: Pressure Injury Interventions:  Sensory Interventions: Assess changes in LOC, Check visual cues for pain, Keep linens dry and wrinkle-free, Minimize linen layers    Moisture Interventions: Absorbent underpads, Minimize layers    Activity Interventions: Pressure redistribution bed/mattress(bed type)    Mobility Interventions: Pressure redistribution bed/mattress (bed type)    Nutrition Interventions: Document food/fluid/supplement intake    Friction and Shear Interventions: Minimize layers                Problem: Falls - Risk of  Goal: *Absence of Falls  Description: Document Khadra Fall Risk and appropriate interventions in the flowsheet.   Outcome: Progressing Towards Goal  Note: Fall Risk Interventions:  Mobility Interventions: Patient to call before getting OOB, Utilize walker, cane, or other assistive device         Medication Interventions: Evaluate medications/consider consulting pharmacy, Patient to call before getting OOB    Elimination Interventions: Call light in reach, Patient to call for help with toileting needs    History of Falls Interventions: Door open when patient unattended, Evaluate medications/consider consulting pharmacy         Problem: Patient Education: Go to Patient Education Activity  Goal: Patient/Family Education  Outcome: Progressing Towards Goal

## 2022-07-24 ENCOUNTER — APPOINTMENT (OUTPATIENT)
Dept: GENERAL RADIOLOGY | Age: 62
DRG: 234 | End: 2022-07-24
Attending: PHYSICIAN ASSISTANT
Payer: COMMERCIAL

## 2022-07-24 ENCOUNTER — APPOINTMENT (OUTPATIENT)
Dept: GENERAL RADIOLOGY | Age: 62
DRG: 234 | End: 2022-07-24
Attending: INTERNAL MEDICINE
Payer: COMMERCIAL

## 2022-07-24 LAB
BASOPHILS # BLD: 0 K/UL (ref 0–0.1)
BASOPHILS NFR BLD: 0 % (ref 0–2)
DIFFERENTIAL METHOD BLD: ABNORMAL
EOSINOPHIL # BLD: 0.3 K/UL (ref 0–0.4)
EOSINOPHIL NFR BLD: 3 % (ref 0–5)
ERYTHROCYTE [DISTWIDTH] IN BLOOD BY AUTOMATED COUNT: 15.3 % (ref 11.6–14.5)
GLUCOSE BLD STRIP.AUTO-MCNC: 130 MG/DL (ref 70–110)
GLUCOSE BLD STRIP.AUTO-MCNC: 95 MG/DL (ref 70–110)
GLUCOSE BLD STRIP.AUTO-MCNC: 95 MG/DL (ref 70–110)
GLUCOSE BLD STRIP.AUTO-MCNC: 97 MG/DL (ref 70–110)
HCT VFR BLD AUTO: 28 % (ref 35–45)
HGB BLD-MCNC: 8.9 G/DL (ref 12–16)
IMM GRANULOCYTES # BLD AUTO: 0.1 K/UL (ref 0–0.04)
IMM GRANULOCYTES NFR BLD AUTO: 1 % (ref 0–0.5)
LYMPHOCYTES # BLD: 1.2 K/UL (ref 0.9–3.6)
LYMPHOCYTES NFR BLD: 14 % (ref 21–52)
MCH RBC QN AUTO: 31 PG (ref 24–34)
MCHC RBC AUTO-ENTMCNC: 31.8 G/DL (ref 31–37)
MCV RBC AUTO: 97.6 FL (ref 78–100)
MONOCYTES # BLD: 1.1 K/UL (ref 0.05–1.2)
MONOCYTES NFR BLD: 13 % (ref 3–10)
NEUTS SEG # BLD: 6 K/UL (ref 1.8–8)
NEUTS SEG NFR BLD: 69 % (ref 40–73)
NRBC # BLD: 0.02 K/UL (ref 0–0.01)
NRBC BLD-RTO: 0.2 PER 100 WBC
PLATELET # BLD AUTO: 222 K/UL (ref 135–420)
PMV BLD AUTO: 11.3 FL (ref 9.2–11.8)
RBC # BLD AUTO: 2.87 M/UL (ref 4.2–5.3)
WBC # BLD AUTO: 8.6 K/UL (ref 4.6–13.2)

## 2022-07-24 PROCEDURE — 85025 COMPLETE CBC W/AUTO DIFF WBC: CPT

## 2022-07-24 PROCEDURE — APPNB60 APP NON BILLABLE TIME 46-60 MINS: Performed by: PHYSICIAN ASSISTANT

## 2022-07-24 PROCEDURE — 99232 SBSQ HOSP IP/OBS MODERATE 35: CPT | Performed by: HOSPITALIST

## 2022-07-24 PROCEDURE — 71045 X-RAY EXAM CHEST 1 VIEW: CPT

## 2022-07-24 PROCEDURE — 74011250637 HC RX REV CODE- 250/637: Performed by: INTERNAL MEDICINE

## 2022-07-24 PROCEDURE — 2709999900 HC NON-CHARGEABLE SUPPLY

## 2022-07-24 PROCEDURE — 65660000004 HC RM CVT STEPDOWN

## 2022-07-24 PROCEDURE — 74011250636 HC RX REV CODE- 250/636: Performed by: PHYSICIAN ASSISTANT

## 2022-07-24 PROCEDURE — 82962 GLUCOSE BLOOD TEST: CPT

## 2022-07-24 PROCEDURE — 74011000250 HC RX REV CODE- 250: Performed by: PHYSICIAN ASSISTANT

## 2022-07-24 PROCEDURE — 36415 COLL VENOUS BLD VENIPUNCTURE: CPT

## 2022-07-24 PROCEDURE — 99232 SBSQ HOSP IP/OBS MODERATE 35: CPT | Performed by: INTERNAL MEDICINE

## 2022-07-24 PROCEDURE — 74011250637 HC RX REV CODE- 250/637: Performed by: PHYSICIAN ASSISTANT

## 2022-07-24 PROCEDURE — 99024 POSTOP FOLLOW-UP VISIT: CPT | Performed by: PHYSICIAN ASSISTANT

## 2022-07-24 RX ORDER — POLYETHYLENE GLYCOL 3350 17 G/17G
17 POWDER, FOR SOLUTION ORAL 2 TIMES DAILY
Status: DISCONTINUED | OUTPATIENT
Start: 2022-07-24 | End: 2022-07-25 | Stop reason: HOSPADM

## 2022-07-24 RX ADMIN — DOCUSATE SODIUM 100 MG: 100 CAPSULE, LIQUID FILLED ORAL at 09:02

## 2022-07-24 RX ADMIN — HYDROCODONE BITARTRATE AND ACETAMINOPHEN 1 TABLET: 5; 325 TABLET ORAL at 05:13

## 2022-07-24 RX ADMIN — SODIUM CHLORIDE, PRESERVATIVE FREE 10 ML: 5 INJECTION INTRAVENOUS at 15:57

## 2022-07-24 RX ADMIN — CHLORHEXIDINE GLUCONATE 0.12% ORAL RINSE 10 ML: 1.2 LIQUID ORAL at 09:03

## 2022-07-24 RX ADMIN — GUAIFENESIN 600 MG: 600 TABLET ORAL at 22:29

## 2022-07-24 RX ADMIN — ATORVASTATIN CALCIUM 40 MG: 40 TABLET, FILM COATED ORAL at 22:29

## 2022-07-24 RX ADMIN — HYDROCODONE BITARTRATE AND ACETAMINOPHEN 1 TABLET: 5; 325 TABLET ORAL at 15:56

## 2022-07-24 RX ADMIN — ENOXAPARIN SODIUM 40 MG: 100 INJECTION SUBCUTANEOUS at 18:28

## 2022-07-24 RX ADMIN — CHLORHEXIDINE GLUCONATE 0.12% ORAL RINSE 10 ML: 1.2 LIQUID ORAL at 18:28

## 2022-07-24 RX ADMIN — DOCUSATE SODIUM 100 MG: 100 CAPSULE, LIQUID FILLED ORAL at 18:26

## 2022-07-24 RX ADMIN — FAMOTIDINE 20 MG: 20 TABLET ORAL at 18:26

## 2022-07-24 RX ADMIN — SODIUM CHLORIDE, PRESERVATIVE FREE 10 ML: 5 INJECTION INTRAVENOUS at 06:39

## 2022-07-24 RX ADMIN — GUAIFENESIN 600 MG: 600 TABLET ORAL at 09:01

## 2022-07-24 RX ADMIN — CLOPIDOGREL BISULFATE 75 MG: 75 TABLET ORAL at 09:02

## 2022-07-24 RX ADMIN — FAMOTIDINE 20 MG: 20 TABLET ORAL at 09:01

## 2022-07-24 RX ADMIN — ASPIRIN 81 MG: 81 TABLET, COATED ORAL at 09:00

## 2022-07-24 RX ADMIN — SODIUM CHLORIDE, PRESERVATIVE FREE 10 ML: 5 INJECTION INTRAVENOUS at 22:00

## 2022-07-24 NOTE — PROGRESS NOTES
CARDIAC SURGERY PROGRESS NOTE    2022  5:54 PM     CC:  Post Operative Day # 9     Chart, images and labs reviewed. Discussed with available staff. Interval History/Events of Past 24 hours:   Cardiothoracic surgery has resumed care of the patient after 3 days without a cardiothoracic surgeon due to illness. Thanks to the Hospitalist and Pulmonology services. Walks in ortiz without supplemental oxygen. Tolerating a diet. Has moved bowels since OR but not in the last 7 to 10 days. Meticulously reviewed her preop and postop chest x-rays. No significant difference in right apical airspace with clamping trial.  No airleak or tidaling when tube unclamped. Chest tube placed to suction, 20, with tiny leak followed by no provocative leak. Symptomatically tolerated clamping trial without issue. Subjective:  Patient seen and examined on rounds today. No complaint  Pain level: Well controlled  Ambulating: well   Sleeping: well  Eating:  well  Sternal pain: Yes    BM: Yes    Objective:  Vital signs:   Visit Vitals  /67   Pulse 73   Temp 98.2 °F (36.8 °C)   Resp 18   Ht 5' 2\" (1.575 m)   Wt 55.8 kg (123 lb)   SpO2 97%   BMI 22.50 kg/m²     Temp (24hrs), Av.2 °F (36.8 °C), Min:97.6 °F (36.4 °C), Max:98.6 °F (37 °C)    Admission Weight: Last Weight   Weight: 61.7 kg (136 lb) Weight: 55.8 kg (123 lb)     Physical Examination:     General:  Alert, oriented   Chest: Dressings clean and dry. Midline incision healing well. Sternum stable. Abdomen: Soft and non-tender without masses. Extremities: Warm and well perfused. Edema absent. Incisions: clean, dry, no drainage. Intact staple lines bilateral lower legs  Neuro: No deficit.     Beta-blocker:  Contraindicated due to postoperative bradycardia, junctional rhythm and now borderline blood pressure  ASA: Yes   Statin: Yes  ACE-I: No  Diuretic: No     DVT Prophylaxis:   pneumatic compression boots: Yes  Compression stockings:  Yes  Enoxaparin: Yes    Chest tubes:  present. Air Leak:  No    Pacing wires:  not present    DME needs:   TBD          Labs:  Lab Results   Component Value Date/Time    WBC 8.6 07/24/2022 06:56 AM    HCT 28.0 (L) 07/24/2022 06:56 AM     07/24/2022 06:56 AM      Lab Results   Component Value Date/Time     07/22/2022 05:34 AM    K 4.3 07/22/2022 05:34 AM     07/22/2022 05:34 AM    CO2 27 07/22/2022 05:34 AM     (H) 07/22/2022 05:34 AM    BUN 13 07/22/2022 05:34 AM    CREA 0.66 07/22/2022 05:34 AM    CREA 0.67 07/21/2022 05:05 AM    CREA 0.71 07/20/2022 02:15 AM       Assessment:  S/P  CABG x 3  Respiratory parameters stable  Cardiac status stable     Plans:  1. Chest tube removed without difficulty. Occlusive dressing placed. Follow-up chest x-ray now and in the morning. Signs and symptoms of pneumothorax discussed with patient and her nurse. 2.  Continue current medications including aspirin, statin and Plavix. We will continue to hold beta-blocker due to marginal blood pressures. 3.  We will expect discharge tomorrow morning. Will need typical follow-up with our service, PCP and cardiology. We will remove leg skin staples at our 1 week follow-up. 4.    Discussed with Dr. Marcela Archibald and Tyrone Christianson. Heart Hugger use is encouraged to mitigate pain and will also assist with deep breathing and incentive spirometry goals. Phillip Pradhan PA-C    PLEASE NOTE:  This document has been produced using voice recognition software. Unrecognized errors in transcription may be present. NOTE TO PATIENT:  The purpose of this note is to communicate optimally with the other providers involved in your care. It is written using standard medical terminology. If you have questions regarding details of the note please call my office at [phone] and make an appointment to discuss your concerns.

## 2022-07-24 NOTE — CONSULTS
[facility]  Pulmonary, Critical Care, and Sleep Medicine    Initial Patient Referral report    Name: Vandana Quinn MRN: [id number]   : 1960 Hospital: [facility]   Date: 2022        IMPRESSION:   Right pneumothorax with failed clamping trial necessitating continuation of chest tube. Bilateral chest tubes were placed intraoperatively with successful removal of left-sided chest tube. Imaging does not show any significant pleural effusion, small apical pneumothorax likely. S/p CABG x3-LIMA to LAD, SVG-diagonal, SVG-OM on 7/15/2022  S/p NSTEMI  History of tobacco use-20-pack-year smoking      RECOMMENDATIONS:     Continue chest tube to waterseal.  Will follow-up imaging, clamping trials until able to tolerate for successful removal.- defer to CT surgery  Bronchial hygiene protocol  Continue current medical management per primary team  Aspiration precautions  OT, PT, OOB and ambulate  Will Follow for chest tube monitoring and management until cardiothoracic services available  DVT, PUD prophylaxis     Subjective: This patient has been seen and evaluated at the request of  Florence BUTTERFIELD for chest tube management. Patient is a 57 y.o. female had initially presented to the ER with complaints of chest pain at [facility] and noted to have elevated troponin. She was started on heparin drip initially and after subsequent evaluation underwent coronary artery bypass grafting-triple-vessel LIMA to LAD, reverse saphenous vein grafts to diagonal and obtuse marginal coronary arteries. Patient postop has been recovering but noted to have a right pneumothorax needing chest tube placement. Patient has been ambulating, denies any complaints of chest pain except when she tries to cough hard. She is making all efforts at coughing and deep breathing and states that she is able to expectorate mucus with some difficulty. She is requiring a sternal brace.   Chest tube could not be removed after a clamping trial and left in place. Overnight she was left on waterseal and today does not have any significant leak. Pulmonology was requested to see the patient for chest tube management since there will be a gap in cardiothoracic coverage for next 2 days. Her left-sided chest tube and mediastinal drains were successfully removed. Her initial postop course was complicated by bradycardia arrhythmias which resolved after discontinuation of amiodarone  She required blood transfusion. No other complicating issues noted    I have reviewed all of the available data including the patient's previous history external records and radiological imaging available for review. In addition applicable cardiology and other lab data were also reviewed. Interval evaluation:  07/24/22  LOS:11    Patient seen at bedside earlier today  CXR results reviewed with CT surgery team earlier today, possibly some slight enlargement but could also be technique- CT clamped- plan for CXR this afternoon- pending those results- may be able to remove CT. No crepitus on my exam today.  Plan to discuss results with CT team later today  She remains on room air  She had not walked with PT at time of my evaluation  She has some chest wall discomfort from surgical sites - but this is improving, she is wearing her brace  She is tolerating PO, no nausea or emesis  No wheezing, no sputum, no hemoptyis    Patient Vitals for the past 24 hrs:   Temp Pulse Resp BP SpO2   07/24/22 1618 98.2 °F (36.8 °C) 73 18 133/67 97 %   07/24/22 1143 98.3 °F (36.8 °C) 77 22 119/61 97 %   07/24/22 0731 97.6 °F (36.4 °C) 68 18 112/66 96 %   07/24/22 0400 98.6 °F (37 °C) 71 17 125/69 95 %   07/24/22 0000 98.5 °F (36.9 °C) 73 18 108/66 93 %   07/23/22 2100 -- -- -- -- 98 %   07/23/22 1930 98 °F (36.7 °C) 71 18 113/65 98 %       Inpat Anti-Infectives (From admission, onward)       Start     Ordered Stop    07/15/22 0714  vancomycin (VANCOCIN) 1,000 mg injection        Note to Pharmacy: Glen Calhoun: cabinet override    07/15/22 0714 07/15/22 1959                      Past Medical History:   Diagnosis Date    Arthritis       Past Surgical History:   Procedure Laterality Date    HX PARTIAL HYSTERECTOMY        Prior to Admission medications    Medication Sig Start Date End Date Taking? Authorizing Provider   ibuprofen (MOTRIN) 600 mg tablet Take 1 Tab by mouth every six (6) hours as needed for Pain. 1/1/20   Tamra Catherine PA-C   cyclobenzaprine (FLEXERIL) 10 mg tablet Take 1 Tab by mouth three (3) times daily as needed for Muscle Spasm(s). 1/1/20   Tamra Catherine PA-C   acetaminophen (TYLENOL) 325 mg tablet Take 2 Tabs by mouth every four (4) hours as needed for Pain.  10/11/17   Maritza Orourke MD     No Known Allergies   Social History     Tobacco Use    Smoking status: Every Day     Packs/day: 0.50     Types: Cigarettes    Smokeless tobacco: Not on file   Substance Use Topics    Alcohol use: No      Family History   Problem Relation Age of Onset    Heart Disease Maternal Grandmother     Heart Attack Daughter       Current Facility-Administered Medications   Medication Dose Route Frequency    clopidogreL (PLAVIX) tablet 75 mg  75 mg Oral DAILY    guaiFENesin ER (MUCINEX) tablet 600 mg  600 mg Oral Q12H    insulin lispro (HUMALOG) injection   SubCUTAneous AC&HS    sodium chloride (NS) flush 5-40 mL  5-40 mL IntraVENous Q8H    [Held by provider] metoprolol tartrate (LOPRESSOR) tablet 12.5 mg  12.5 mg Oral Q12H    aspirin delayed-release tablet 81 mg  81 mg Oral DAILY    chlorhexidine (PERIDEX) 0.12 % mouthwash 10 mL  10 mL Oral BID    docusate sodium (COLACE) capsule 100 mg  100 mg Oral BID    enoxaparin (LOVENOX) injection 40 mg  40 mg SubCUTAneous Q24H    famotidine (PEPCID) tablet 20 mg  20 mg Oral BID    nicotine (NICODERM CQ) 14 mg/24 hr patch 1 Patch  1 Patch TransDERmal DAILY    atorvastatin (LIPITOR) tablet 40 mg  40 mg Oral QHS         REVIEW OF SYSTEMS:       [x]Total of 12 systems reviewed as follows:     GENERAL:no fever and chills   HEENT: no sinus congestion / hearing or vision changes  NECK: No pain or stiffness. PULMONARY: no shortness of breath and cough ,wheezing, some chest discomfort with attempted coughing  Cardiovascular: denies palpitations or chest pain; no lower extremity edema  GASTROINTESTINAL: Denies abdominal pain, constipation, diarrhea, nausea, vomiting or melena / bloody stools  GENITOURINARY: No urinary frequency, urgency, hesitancy or dysuria. MUSCULOSKELETAL: no back / joint or muscle pain, no recent trauma. DERMATOLOGIC: denies pruritis, rashes or open areas   ENDOCRINE: No polyuria, polydipsia, no heat or cold intolerance. HEMATOLOGICAL: No anemia or easy bruising or bleeding. NEUROLOGIC:  no focal weakness,  no speech changes     Objective:   Vital Signs:    Visit Vitals  /67   Pulse 73   Temp 98.2 °F (36.8 °C)   Resp 18   Ht 5' 2\" (1.575 m)   Wt 55.8 kg (123 lb)   SpO2 97%   BMI 22.50 kg/m²       O2 Device: None (Room air)   O2 Flow Rate (L/min): 2 l/min   Temp (24hrs), Av.2 °F (36.8 °C), Min:97.6 °F (36.4 °C), Max:98.6 °F (37 °C)       Intake/Output:   Last shift:       07 -  1900  In: 0   Out: 980 [Urine:950]  Last 3 shifts:  190 -  0700  In: 720 [P.O.:720]  Out: 1160 [Urine:1000]    Intake/Output Summary (Last 24 hours) at 2022 1624  Last data filed at 2022 1557  Gross per 24 hour   Intake 240 ml   Output 1410 ml   Net -1170 ml        Physical Exam:   General:  Alert, cooperative, no distress, appears stated age. Head:  Normocephalic, without obvious abnormality, atraumatic. Eyes:  Conjunctivae/corneas clear. PERRL, EOMs intact. Nose: Nares normal. Septum midline. Mucosa normal. No drainage or sinus tenderness.    Throat: Lips, mucosa, and tongue normal. Teeth and gums normal.   Neck: Supple, symmetrical, trachea midline, no adenopathy, thyroid: no enlargment/tenderness/nodules, no carotid bruit and no JVD. Back:   Symmetric,    Lungs:   Clear to auscultation bilaterally. With decreased intensity breath sounds at bases   Chest wall:  Sternal brace in place, chest tube on right   Heart:  Regular rate and rhythm, S1, S2 normal, no murmur, click, rub or gallop. Abdomen:   Soft, non-tender. Bowel sounds normal. No masses,  No organomegaly. Extremities: Extremities normal, atraumatic, no cyanosis or edema. Pulses: 2+ and symmetric all extremities. Skin: Skin color, texture, turgor normal. No rashes or lesions   Lymph nodes: Cervical, supraclavicular, nodes are unremarkable   Neurologic: Grossly nonfocal     Data review:     Recent Results (from the past 24 hour(s))   GLUCOSE, POC    Collection Time: 07/23/22  9:35 PM   Result Value Ref Range    Glucose (POC) 132 (H) 70 - 110 mg/dL   CBC WITH AUTOMATED DIFF    Collection Time: 07/24/22  6:56 AM   Result Value Ref Range    WBC 8.6 4.6 - 13.2 K/uL    RBC 2.87 (L) 4.20 - 5.30 M/uL    HGB 8.9 (L) 12.0 - 16.0 g/dL    HCT 28.0 (L) 35.0 - 45.0 %    MCV 97.6 78.0 - 100.0 FL    MCH 31.0 24.0 - 34.0 PG    MCHC 31.8 31.0 - 37.0 g/dL    RDW 15.3 (H) 11.6 - 14.5 %    PLATELET 626 428 - 946 K/uL    MPV 11.3 9.2 - 11.8 FL    NRBC 0.2 (H) 0  WBC    ABSOLUTE NRBC 0.02 (H) 0.00 - 0.01 K/uL    NEUTROPHILS 69 40 - 73 %    LYMPHOCYTES 14 (L) 21 - 52 %    MONOCYTES 13 (H) 3 - 10 %    EOSINOPHILS 3 0 - 5 %    BASOPHILS 0 0 - 2 %    IMMATURE GRANULOCYTES 1 (H) 0.0 - 0.5 %    ABS. NEUTROPHILS 6.0 1.8 - 8.0 K/UL    ABS. LYMPHOCYTES 1.2 0.9 - 3.6 K/UL    ABS. MONOCYTES 1.1 0.05 - 1.2 K/UL    ABS. EOSINOPHILS 0.3 0.0 - 0.4 K/UL    ABS. BASOPHILS 0.0 0.0 - 0.1 K/UL    ABS. IMM.  GRANS. 0.1 (H) 0.00 - 0.04 K/UL    DF AUTOMATED     GLUCOSE, POC    Collection Time: 07/24/22  7:31 AM   Result Value Ref Range    Glucose (POC) 95 70 - 110 mg/dL   GLUCOSE, POC    Collection Time: 07/24/22 11:59 AM   Result Value Ref Range    Glucose (POC) 97 70 - 110 mg/dL   GLUCOSE, POC    Collection Time: 07/24/22  4:16 PM   Result Value Ref Range    Glucose (POC) 95 70 - 110 mg/dL       Imaging:  I have personally reviewed the patients radiographs and have reviewed the reports:  XR Results (most recent):  Results from Hospital Encounter encounter on 07/13/22    XR CHEST PORT    Narrative  EXAM: XR CHEST PORT    CLINICAL INDICATION/HISTORY : interval eval for PTX- CT  now clamped. COMPARISON: July 24, 2022 at 2:42 AM    TECHNIQUE: 1 VIEWS    FINDINGS:  Similar right pleural tube. Right pneumothorax is slightly larger now measuring  1.9 cm in width, previously 1.3 cm. Left pneumothorax is smaller with only a  tiny residual pneumothorax  Similar appearance of the lungs and mediastinal structures. EKG leads and wires overlie the chest..  . Impression  1. The right pneumothorax is slightly larger now measuring 1.9 cm in  height-previously 1.3 cm. Chest imaging from 7/22/2022-right-sided chest tube in place    CT Results (most recent):  No results found for this or any previous visit.         07/13/22    ECHO ADULT COMPLETE 07/13/2022 7/13/2022    Interpretation Summary  Formatting of this result is different from the original.      Left Ventricle: Normal left ventricular systolic function with a visually estimated EF of 55 - 60%. Left ventricle size is normal. Normal wall thickness. Normal wall motion. Normal diastolic function. Mitral Valve: Mildly thickened leaflet. Mild regurgitation. Signed by: Shayy Anne MD on 7/13/2022  3:17 PM      Complex decision making was made in the evaluation and management plans during this consultation. More than 50% of time was spent in counseling and coordination of care including review of data and discussion with other team members.               Ja Araujo DO, [facility]    [facility]  Pulmonary, Critical Care, and Sleep Medicine

## 2022-07-24 NOTE — PROGRESS NOTES
Bedside shift change report given to JUMANA Goodman (oncoming nurse) by Sylwia Davis (offgoing nurse). Report included the following information SBAR, Kardex, Intake/Output, MAR, Recent Results, and Cardiac Rhythm NSR . Wound Prevention Checklist    Patient: Paulino Holland (86 y.o. female)  Date: 2022  Diagnosis: NSTEMI (non-ST elevated myocardial infarction) ([facility] [address] [I21.4] S/P CABG x 3    Precautions: Fall, Sternal       []  Heel prevention boots placed on patient    []  Patient turned q2h during shift    []  Lift team ordered    []  Patient on [place] bed/Specialty bed    [x]  Each Wound is documented during shift (Stage, Color, drainage, odor, measurements, and dressings)    [x]  Dual skin checks done at bedside during shift report with [facility], RN     1765: Shift assessment done. V/S obtained. No c/o pain or SOB. 98% on RA. Pt is A/Ox4; resting in bed, watching TV; family at the bedside. Incision dressing on BLE are clean, dry, and intact. Incision on chest noted clean, dry, and intact. Chest tube draining on gravity. Pt ambulate to the bathroom to void and went back to bed after. Call light within reach. 2134: Scheduled med given. Pt request to use the bathroom. 2226: B; No insulin coverage given per protocol. 0000: No changes from previous assessment. Pt sleeping comfortably. No c/o pain or SOB. 93% on RA. Pt used the bathroom to void. No other needs expressed. Call light within reach. 0200: Pt sleeping comfortably. No distress    0400: No changes from previous assessment. Pt sleeping comfortably. No c/o pain or SOB. 95% on RA. Chest tube draining on gravity. Incision dressings are clean, dry, and intact. No other needs expressed. Call light within reach. 0500: Pt. Ambulates to the bathroom. CHG bath done. Linens, draw sheets, and gown changed. Pt c/o 6/10 pain on her Right lateral chest. PRN pain med given per request. No other needs expressed.  Call light within reach.     0600: Pt sleeping comfortably. No distress. 0700: Bedside and Verbal shift change report given to KIM Olsen (oncoming nurse) by Aaliyah Mcnulty (offgoing nurse). Report included the following information SBAR, Kardex, Intake/Output, MAR, Recent Results, and Cardiac Rhythm NSR .

## 2022-07-24 NOTE — PROGRESS NOTES
Hospitalist Progress Note    Patient: Cheo Ruvalcaba MRN: [id number]  CSN: [id number]    YOB: 1960  Age: 57 y.o. Sex: female    DOA: 7/13/2022 LOS:  LOS: 11 days            Assessment/Plan     Principal Problem:    S/P CABG x 3 (7/16/2022)      Overview: Dr. Katja Pagan, 7/15/2022:  Triple Coronary Artery Bypass Surgery with LIMA to       LAD, Reverse Saphenous Vein Grafts to the Diagonal and Obtuse Marginal       Coronary Arteries    Active Problems:    NSTEMI (non-ST elevated myocardial infarction) ([facility] [address] (7/13/2022)  Coronary artery disease status post CABG: Patient who has a pneumothorax and a also has a chest tube that is in place and a CT surgery has been following for this and now will be followed by pulmonary critical care for the chest tube. Continue medical management. Aspirin, Lipitor, Plavix. Interval history: 26-year-old female presented to the emergency room with complaints of chest pain at [facility] and was noted to have elevated troponin and was started on heparin drip and NSTEMI was diagnosed. .    Patient underwent CABG and subsequently later on noticed to have pneumothorax and had chest tubes placed and has been monitored by CT surgery. They were not able to remove the last chest tube due to some air leak. CT surgery will not be available until Sunday evening and so the patient is transferred to the medicine service. 7/22-no new events overnight, pulmonology consulted, advised to continue chest tube to waterseal.  Continue clamping trials until able to tolerate for successful removal.  Patient seen by PT OT, recommending home with home health    7/23-no new events overnight, pulmonology on board for chest tube management. Continue current treatment plan. 7/24-no new events overnight, continue chest tube to waterseal.  Anticipate transfer to CT surgery service in a.m.       Vital signs/Intake and Output:  Visit Vitals  /61   Pulse 77   Temp 98.3 °F (36.8 °C) Resp 22   Ht 5' 2\" (1.575 m)   Wt 55.8 kg (123 lb)   SpO2 97%   BMI 22.50 kg/m²     Current Shift:  07/24 0701 - 07/24 1900  In: 0   Out: 180 [Urine:150]  Last three shifts:  07/22 1901 - 07/24 0700  In: 720 [P.O.:720]  Out: 1160 [Urine:1000]      Medications Reviewed      Labs: Results:       Chemistry Recent Labs     07/22/22  0534   *      K 4.3      CO2 27   BUN 13   CREA 0.66   CA 8.6   AGAP 6   BUCR 20        CBC w/Diff Recent Labs     07/24/22  0656   WBC 8.6   RBC 2.87*   HGB 8.9*   HCT 28.0*      GRANS 69   LYMPH 14*   EOS 3        Cardiac Enzymes No results for input(s): CPK, CKND1, GAYLE in the last 72 hours. No lab exists for component: CKRMB, TROIP   Coagulation No results for input(s): PTP, INR, APTT, INREXT, INREXT in the last 72 hours. Lipid Panel Lab Results   Component Value Date/Time    Cholesterol, total 127 07/14/2022 05:39 AM    HDL Cholesterol 43 07/14/2022 05:39 AM    LDL, calculated 73.8 07/14/2022 05:39 AM    VLDL, calculated 10.2 07/14/2022 05:39 AM    Triglyceride 51 07/14/2022 05:39 AM    CHOL/HDL Ratio 3.0 07/14/2022 05:39 AM      BNP No results for input(s): BNPP in the last 72 hours. Liver Enzymes No results for input(s): TP, ALB, TBIL, AP in the last 72 hours.     No lab exists for component: SGOT, GPT, DBIL   Thyroid Studies Lab Results   Component Value Date/Time    TSH 0.68 07/13/2022 04:52 PM        Procedures/imaging: see electronic medical records for all procedures/Xrays and details which were not copied into this note but were reviewed prior to creation of Plan            July 24, 2022  Corinna Reese MD

## 2022-07-24 NOTE — PROGRESS NOTES
Bedside and Verbal shift change report given to Jose Aldana (oncoming nurse) by Shanta Gonzalez RN (offgoing nurse). Report included the following information SBAR, Kardex, Intake/Output, MAR, and Cardiac Rhythm Normal Sinus Rhythm .     Wound Prevention Checklist    Patient: Ora Gaona (12 y.o. female)  Date: 7/24/2022  Diagnosis: NSTEMI (non-ST elevated myocardial infarction) ([facility] [address] [I21.4] S/P CABG x 3    Precautions: Fall, Sternal       []  Heel prevention boots placed on patient    []  Patient turned q2h during shift    []  Lift team ordered    []  Patient on [place] bed/Specialty bed    [x]  Each Wound is documented during shift (Stage, Color, drainage, odor, measurements, and dressings)    [x]  Dual skin checks done at bedside during shift report with Juanjo Wylie RN

## 2022-07-24 NOTE — PROGRESS NOTES
Problem: Pressure Injury - Risk of  Goal: *Prevention of pressure injury  Description: Document Vargas Scale and appropriate interventions in the flowsheet. Outcome: Progressing Towards Goal  Note: Pressure Injury Interventions:  Sensory Interventions: Assess changes in LOC, Check visual cues for pain, Minimize linen layers    Moisture Interventions: Absorbent underpads, Minimize layers    Activity Interventions: Pressure redistribution bed/mattress(bed type)    Mobility Interventions: Pressure redistribution bed/mattress (bed type)    Nutrition Interventions: Document food/fluid/supplement intake    Friction and Shear Interventions: Minimize layers, HOB 30 degrees or less                Problem: Falls - Risk of  Goal: *Absence of Falls  Description: Document Khadra Fall Risk and appropriate interventions in the flowsheet.   Outcome: Progressing Towards Goal  Note: Fall Risk Interventions:  Mobility Interventions: Patient to call before getting OOB, Utilize walker, cane, or other assistive device, Strengthening exercises (ROM-active/passive)         Medication Interventions: Evaluate medications/consider consulting pharmacy, Patient to call before getting OOB, Teach patient to arise slowly    Elimination Interventions: Call light in reach, Patient to call for help with toileting needs    History of Falls Interventions: Evaluate medications/consider consulting pharmacy         Problem: Patient Education: Go to Patient Education Activity  Goal: Patient/Family Education  Outcome: Progressing Towards Goal     Problem: CABG: Post-Op Day 2/Transfer Day  Goal: Off Pathway (Use only if patient is Off Pathway)  Outcome: Resolved/Met  Goal: Activity/Safety  Outcome: Resolved/Met  Goal: Consults, if ordered  Outcome: Resolved/Met  Goal: Diagnostic Test/Procedures  Outcome: Resolved/Met  Goal: Nutrition/Diet  Outcome: Resolved/Met  Goal: Medications  Outcome: Resolved/Met  Goal: Discharge Planning  Outcome: Resolved/Met  Goal: Respiratory  Outcome: Resolved/Met  Goal: Treatments/Interventions/Procedures  Outcome: Resolved/Met  Goal: Psychosocial  Outcome: Resolved/Met  Goal: *Hemodynamically stable without vasoactive medications  Outcome: Resolved/Met  Goal: *Lungs clear or at baseline  Outcome: Resolved/Met  Goal: *Optimal pain control at patient's stated goal  Outcome: Resolved/Met  Goal: *Demonstrates progressive activity  Outcome: Resolved/Met  Goal: *Tolerating diet  Outcome: Resolved/Met

## 2022-07-24 NOTE — PROGRESS NOTES
[facility]        CXR stable after tube removal.  Repeat in AM and then discharge if stable.

## 2022-07-24 NOTE — PROGRESS NOTES
Cardiothoracic doctor at bedside. Chest tube taken out, chest xray ordered x2. Patient educated on S/S to look out for a notify nurse.  Will continue to monitor pt

## 2022-07-25 ENCOUNTER — APPOINTMENT (OUTPATIENT)
Dept: GENERAL RADIOLOGY | Age: 62
DRG: 234 | End: 2022-07-25
Attending: PHYSICIAN ASSISTANT
Payer: COMMERCIAL

## 2022-07-25 VITALS
SYSTOLIC BLOOD PRESSURE: 119 MMHG | RESPIRATION RATE: 16 BRPM | HEART RATE: 71 BPM | HEIGHT: 62 IN | BODY MASS INDEX: 22.63 KG/M2 | DIASTOLIC BLOOD PRESSURE: 64 MMHG | TEMPERATURE: 97.6 F | OXYGEN SATURATION: 97 % | WEIGHT: 123 LBS

## 2022-07-25 LAB
GLUCOSE BLD STRIP.AUTO-MCNC: 105 MG/DL (ref 70–110)
GLUCOSE BLD STRIP.AUTO-MCNC: 111 MG/DL (ref 70–110)

## 2022-07-25 PROCEDURE — 97535 SELF CARE MNGMENT TRAINING: CPT

## 2022-07-25 PROCEDURE — 74011000250 HC RX REV CODE- 250: Performed by: PHYSICIAN ASSISTANT

## 2022-07-25 PROCEDURE — 74011250637 HC RX REV CODE- 250/637: Performed by: INTERNAL MEDICINE

## 2022-07-25 PROCEDURE — 97116 GAIT TRAINING THERAPY: CPT

## 2022-07-25 PROCEDURE — 82962 GLUCOSE BLOOD TEST: CPT

## 2022-07-25 PROCEDURE — 74011250637 HC RX REV CODE- 250/637: Performed by: PHYSICIAN ASSISTANT

## 2022-07-25 PROCEDURE — 71045 X-RAY EXAM CHEST 1 VIEW: CPT

## 2022-07-25 PROCEDURE — APPNB180 APP NON BILLABLE TIME > 60 MINS: Performed by: PHYSICIAN ASSISTANT

## 2022-07-25 PROCEDURE — 97530 THERAPEUTIC ACTIVITIES: CPT

## 2022-07-25 PROCEDURE — 99024 POSTOP FOLLOW-UP VISIT: CPT | Performed by: PHYSICIAN ASSISTANT

## 2022-07-25 RX ORDER — CLOPIDOGREL BISULFATE 75 MG/1
75 TABLET ORAL DAILY
Qty: 30 TABLET | Refills: 2 | Status: SHIPPED | OUTPATIENT
Start: 2022-07-26

## 2022-07-25 RX ORDER — HYDROCODONE BITARTRATE AND ACETAMINOPHEN 5; 325 MG/1; MG/1
1 TABLET ORAL
Qty: 28 TABLET | Refills: 0 | Status: SHIPPED | OUTPATIENT
Start: 2022-07-25 | End: 2022-08-01

## 2022-07-25 RX ORDER — ATORVASTATIN CALCIUM 40 MG/1
40 TABLET, FILM COATED ORAL
Qty: 30 TABLET | Refills: 2 | Status: SHIPPED | OUTPATIENT
Start: 2022-07-25

## 2022-07-25 RX ORDER — ASPIRIN 81 MG/1
81 TABLET ORAL DAILY
Qty: 30 TABLET | Refills: 2 | Status: SHIPPED | OUTPATIENT
Start: 2022-07-26

## 2022-07-25 RX ADMIN — SODIUM CHLORIDE, PRESERVATIVE FREE 10 ML: 5 INJECTION INTRAVENOUS at 06:00

## 2022-07-25 RX ADMIN — DOCUSATE SODIUM 100 MG: 100 CAPSULE, LIQUID FILLED ORAL at 09:28

## 2022-07-25 RX ADMIN — SODIUM CHLORIDE, PRESERVATIVE FREE 10 ML: 5 INJECTION INTRAVENOUS at 15:04

## 2022-07-25 RX ADMIN — CHLORHEXIDINE GLUCONATE 0.12% ORAL RINSE 10 ML: 1.2 LIQUID ORAL at 09:29

## 2022-07-25 RX ADMIN — FAMOTIDINE 20 MG: 20 TABLET ORAL at 09:28

## 2022-07-25 RX ADMIN — GUAIFENESIN 600 MG: 600 TABLET ORAL at 09:29

## 2022-07-25 RX ADMIN — CLOPIDOGREL BISULFATE 75 MG: 75 TABLET ORAL at 09:28

## 2022-07-25 RX ADMIN — ASPIRIN 81 MG: 81 TABLET, COATED ORAL at 09:29

## 2022-07-25 NOTE — PROGRESS NOTES
Problem: Mobility Impaired (Adult and Pediatric)  Goal: *Acute Goals and Plan of Care (Insert Text)  Description: Physical Therapy Goals  Initiated 7/17/2022 and to be accomplished within 7 day(s)  1. Patient will move from supine to sit and sit to supine , scoot up and down, and roll side to side in bed with Anne. 2.  Patient will transfer from bed to chair and chair to bed with Anne using the least restrictive device. 3.  Patient will perform sit to stand with Anne. 4.  Patient will ambulate with Anne for 300 feet with the least restrictive device. 5.  Patient will ascend/descend 12 stairs with unilateral handrail(s) with Anne. PLOF: Pt reporting she lives in 2 story house, independent PTA, will be going to live with her daughter at discharge. Reporting her daughter lives in 2nd floor apartment. Outcome: Resolved/Met   PHYSICAL THERAPY RE-EVALUATION AND DISCHARGE    Patient: Ora Gaona (28 y.o. female)  Date: 7/25/2022  Primary Diagnosis: NSTEMI (non-ST elevated myocardial infarction) ([facility] [address] [I21.4]  Procedure(s) (LRB):  CORONARY ARTERY BYPASS GRAFT (CABG) TIMES THREE (N/A) 10 Days Post-Op   Precautions:  Fall, Sternal      ASSESSMENT :  Pt cleared to participate in PT session, pt received ambulating to bathroom with rollator and agreeable to therapy session. Completing with OT to maximize safety and mobility. Based on the objective data described below, the patient presents with appropriate mobility for safe discharge home. Pt performing toileting with Anne. Pt standing with Anne and ambulating x300 feet with rollator. Ascending/descending 10 steps with Anne. Pt returned to recliner for rest break. Agreeable to ambulate in room without AD. Pt demonstrating decreased step clearance and narrowed base of support. Pt would continue to benefit from RW at this time. Pt also demonstrating good sitting balance, performing dressing task with OT.  Pt positioned for comfort and educated to call for assist before getting up, pt verbalized understanding. Pt left with all needs met and call bell in reach. RN notified of position and participation. Will sign off      Patient does not require further skilled intervention at this level of care. PLAN :  Recommendations and Planned Interventions:   No formal PT needs identified at this time. Discharge Recommendations: None  Further Equipment Recommendations for Discharge: rolling walker    AMPA: Gissell Hernandez scored a 24/24 on the AM-PAC short mobility form. At this time, no further PT is recommended upon discharge due to patient at baseline functional status. Recommend patient returns to prior setting with prior services. Please see assessment section for further patient specific details. This AMPAC score should be considered in conjunction with interdisciplinary team recommendations to determine the most appropriate discharge setting. Patient's social support, diagnosis, medical stability, and prior level of function should also be taken into consideration. SUBJECTIVE:   Patient stated I am ready to go home.     OBJECTIVE DATA SUMMARY:   Hospital course since last seen and reason for re-evaluation: Pt due for re-evaluation, demonstrating Anne mobility, has met all goals. Will sign off.   Past Medical History:   Diagnosis Date    Arthritis      Past Surgical History:   Procedure Laterality Date    HX PARTIAL HYSTERECTOMY       Barriers to Learning/Limitations: None  Compensate with: N/A  Home Situation:   Home Situation  Home Environment: Apartment  # Steps to Enter: 12 (2nd floor )  Rails to Enter: Yes  Wheelchair Ramp: No  One/Two Story Residence: Two story  Living Alone: No  Support Systems: Child(randall)  Patient Expects to be Discharged to[de-identified] Home with family assistance  Current DME Used/Available at Home: None  Tub or Shower Type: Tub/Shower combination  Critical Behavior:  Neurologic State: Alert  Orientation Level: Oriented X4  Cognition: Appropriate decision making  Safety/Judgement: Awareness of environment; Fall prevention  Psychosocial  Patient Behaviors: Calm; Cooperative  Needs Expressed: Educational  Purposeful Interaction: Yes  Pt Identified Daily Priority: Clinical issues (comment)  Caritas Process: Establish trust;Teaching/learning; Attend basic human needs;Create healing environment  Caring Interventions: Reassure; Therapeutic modalities  Reassure: Therapeutic listening;Caring rounds  Therapeutic Modalities: Intentional therapeutic touch;Humor  Other Caring Modalities: hourly rounds  Skin Condition/Temp: Warm     Skin Integrity: Incision (comment)  Skin Integumentary  Skin Color: Appropriate for ethnicity  Skin Condition/Temp: Warm  Skin Integrity: Incision (comment)     Strength:    Strength: Within functional limits    Tone & Sensation:   Tone: Normal    Sensation: Intact    Range Of Motion:  AROM: Within functional limits    Posture:  Posture (WDL): Within defined limits     Functional Mobility:    Transfers:  Sit to Stand: Modified independent  Stand to Sit: Independent    Balance:   Sitting: Intact  Sitting - Static: Good (unsupported)  Sitting - Dynamic: Good (unsupported)  Standing: Intact; With support  Standing - Static: Good  Standing - Dynamic : Good    Ambulation/Gait Training:  Distance (ft): 300 Feet (ft)  Assistive Device: Walker, rollator  Ambulation - Level of Assistance: Modified independent    Gait Abnormalities: Decreased step clearance    Speed/Na: Slow  Step Length: Right shortened;Left shortened    Stairs:  Number of Stairs Trained: 10  Stairs - Level of Assistance: Modified independent  Rail Use: Left     Pain:  Pain level pre-treatment: 0/10   Pain level post-treatment: 0/10     Activity Tolerance:   Good     Please refer to the flowsheet for vital signs taken during this treatment.   After treatment:   [x]         Patient left in no apparent distress sitting up in chair  []         Patient left in no apparent distress in bed  [x]         Call bell left within reach  [x]         Nursing notified  []         Caregiver present  []         Bed alarm activated  []         SCDs applied    COMMUNICATION/EDUCATION:   [x]         Role of Physical Therapy in the acute care setting. [x]         Fall prevention education was provided and the patient/caregiver indicated understanding. []         Patient/family have participated as able in goal setting and plan of care. []         Patient/family agree to work toward stated goals and plan of care. []         Patient understands intent and goals of therapy, but is neutral about his/her participation. []         Patient is unable to participate in goal setting/plan of care: ongoing with therapy staff.  []         Other: Thank you for this referral.  Juanjo Banda, PT   Time Calculation: 23 mins    [facility] AM-PAC® Basic Mobility Inpatient Short Form (6-Clicks) Version 2    How much HELP from another person does the patient currently need    (If the patient hasn't done an activity recently, how much help from another person do you think he/she would need if he/she tried?)   Total (Total A or Dep)   A Lot  (Mod to Max A)   A Little (Sup or Min A)   None (Mod I to I)   Turning from your back to your side while in a flat bed without using bedrails? [] 1 [] 2 [] 3 [x] 4   2. Moving from lying on your back to sitting on the side of a flat bed without using bedrails? [] 1 [] 2 [] 3 [x] 4   3. Moving to and from a bed to a chair (including a wheelchair)? [] 1 [] 2 [] 3 [x] 4   4. Standing up from a chair using your arms (e.g., wheelchair, or bedside chair)? [] 1 [] 2 [] 3 [x] 4   5. Walking in hospital room? [] 1 [] 2 [] 3 [x] 4   6. Climbing 3-5 steps with a railing?+   [] 1 [] 2 [] 3 [x] 4   +If stair climbing cannot be assessed, skip item #6. Sum responses from items 1-5.

## 2022-07-25 NOTE — PROGRESS NOTES
Bedside and Verbal shift change report given to Indira High (oncoming nurse) by Hina Wolfe RN (offgoing nurse). Report included the following information SBAR, Kardex, Intake/Output, MAR, and Cardiac Rhythm NSR .       Wound Prevention Checklist    Patient: Ebenezer Graves (89 y.o. female)  Date: 7/25/2022  Diagnosis: NSTEMI (non-ST elevated myocardial infarction) ([facility] [address] [I21.4] S/P CABG x 3    Precautions: Fall, Sternal       []  Heel prevention boots placed on patient    []  Patient turned q2h during shift    []  Lift team ordered    []  Patient on Friona bed/Specialty bed    [x]  Each Wound is documented during shift (Stage, Color, drainage, odor, measurements, and dressings)    [x]  Dual skin checks done at bedside during shift report with Norman Patricio RN

## 2022-07-25 NOTE — PROGRESS NOTES
Problem: Self Care Deficits Care Plan (Adult)  Goal: *Acute Goals and Plan of Care (Insert Text)  Description: Occupational Therapy Goals  Initiated 7/17/2022 within 7 day(s). Re-evaluated 7/23/2022, pt is making great progress, met mult goals, goal updated. Continue with remaining goals until met. 1.  Patient will perform grooming with supervision/set-up standing at the sink for >3 min with Good balance. (Met/DC)  2.  Patient will perform upper body dressing with supervision/set-up. 3.  Patient will perform lower body dressing with supervision/set-up. 4.  Patient will perform toilet transfers with modified independence. 5.  Patient will perform all aspects of toileting with supervision/set-up (Met/DC)  6.  Patient will recall 100% of sternal precautions and will follow them during ADLs w/o cues. 7.  Patient will utilize energy conservation techniques during functional activities w/o cues. Prior Level of Function: Pt reports being Mod Ind for ADLs and functional mobility. Pt will be staying with her daughter upon DC. Outcome: Progressing Towards Goal   OCCUPATIONAL THERAPY TREATMENT    Patient: Mendez West (35 y.o. female)  Date: 7/25/2022  Diagnosis: NSTEMI (non-ST elevated myocardial infarction) ([facility] [address] [I21.4] S/P CABG x 3  Procedure(s) (LRB):  CORONARY ARTERY BYPASS GRAFT (CABG) TIMES THREE (N/A) 10 Days Post-Op  Precautions: Fall, Sternal  PLOF: Pt reports being Mod Ind for ADLs and functional mobility. Pt will be staying with her daughter upon DC. Chart, occupational therapy assessment, plan of care, and goals were reviewed. ASSESSMENT:  PT co tx to maximize pt safety and participation. Pt presented performing functional mobility to  upon entry MOD I w/ Rollator. She performed toileting routine with MOD I utilizing elevated seat. Once finished, she maneuvered to sink side and performed hand hygiene MOD I without UE support.  Pt then maneuvered in hallways ~ 300 ft MOD I with Rollator to increase overall functional activity tolerance needed for self cares. Pt able to perform stair training MOD I guided by PT. Pt returned back to room and seated in reclining chair. Pt required Supervision doffing/donning  heart hugger requiring 1 VC to maintain sternal precautions. She was left with all needs within reach while in chair. RN made aware of pt's participation and position. Progression toward goals:  []          Improving appropriately and progressing toward goals  []          Improving slowly and progressing toward goals  []          Not making progress toward goals and plan of care will be adjusted     PLAN:  Patient continues to benefit from skilled intervention to address the above impairments. Continue treatment per established plan of care. Discharge Recommendations:  None  Further Equipment Recommendations for Discharge: [facility]: Dulce Serrato scored a 23/24 on the -[facility] ADL Inpatient form. At this time, no further OT is recommended upon discharge due to patient at baseline functional. Recommend patient returns to prior setting with prior services. Please see assessment section for further patient specific details. SUBJECTIVE:   Patient stated I am ready to go home.     OBJECTIVE DATA SUMMARY:   Cognitive/Behavioral Status:  Neurologic State: Alert  Orientation Level: Oriented X4  Cognition: Appropriate decision making  Safety/Judgement: Awareness of environment, Fall prevention    Functional Mobility and Transfers for ADLs:      Transfers:  Sit to Stand: Modified independent  Stand to Sit: Independent                Toilet Transfer : Modified independent           Bathroom Mobility: Modified independent (w/ Rollator)        Balance:  Sitting: Intact  Sitting - Static: Good (unsupported)  Sitting - Dynamic: Good (unsupported)  Standing: Intact; With support  Standing - Static: Good  Standing - Dynamic : Good    ADL Intervention:       Grooming  Position Performed: Standing  Washing Hands: Modified independent    Upper Body Dressing Assistance  Orthotics(Brace): Supervision (heart hugger)       Cognitive Retraining  Safety/Judgement: Awareness of environment; Fall prevention    Pain:  Pain level pre-treatment: 0/10   Pain level post-treatment: 0/10      Activity Tolerance:    Good   Please refer to the flowsheet for vital signs taken during this treatment. After treatment:   [x]  Patient left in no apparent distress sitting up in chair  []  Patient left in no apparent distress in bed  [x]  Call bell left within reach  [x]  Nursing notified  []  Caregiver present  []  Bed alarm activated    COMMUNICATION/EDUCATION:   [x] Role of Occupational Therapy in the acute care setting  [x] Home safety education was provided and the patient/caregiver indicated understanding. [x] Patient/family have participated as able in working towards goals and plan of care. [x] Patient/family agree to work toward stated goals and plan of care. [] Patient understands intent and goals of therapy, but is neutral about his/her participation. [] Patient is unable to participate in goal setting and plan of care. Thank you for this referral.  GEETA Glass  Time Calculation: 23 mins    [employer] AM-PAC® Daily Activity Inpatient Short Form (6-Clicks)    How much HELP from another person does the patient currently need    (If the patient hasn't done an activity recently, how much help from another person do you think he/she would need if he/she tried?)   Total (Total A or Dep)   A Lot  (Mod to Max A)   A Little (Sup or Min A)   None (Mod I to I)   Putting on and taking off regular lower body clothing? [] 1 [] 2 [] 3 [x] 4   2. Bathing (including washing, rinsing,      drying)? [] 1 [] 2 [] 3 [x] 4   3. Toileting, which includes using toilet, bedpan or urinal?   [] 1 [] 2 [] 3 [x] 4   4. Putting on and taking off regular upper body clothing? [] 1 [] 2 [x] 3 [] 4   5.  Taking care of personal grooming such as brushing teeth? [] 1 [] 2 [] 3 [x] 4   6. Eating meals?    [] 1 [] 2 [] 3 [x] 4

## 2022-07-25 NOTE — DISCHARGE INSTRUCTIONS
Please CALL Cardiac Surgery office with any questions or concerns [phone]. - Take all medications as directed. - Wear Heart Hugger daily.   - Shower daily. Wash incisions with soap and water and pat dry. Keep lower leg incision dressed while draining.   - No heavy lifting.   - No driving until cleared by MD and while taking pain medications. - Follow up in Cardiac Surgery clinic in 1 week and 3 weeks. - Follow up with PCP in one week and Cardiology in 3 weeks.

## 2022-07-25 NOTE — PROGRESS NOTES
Problem: Pressure Injury - Risk of  Goal: *Prevention of pressure injury  Description: Document Vargas Scale and appropriate interventions in the flowsheet. Outcome: Resolved/Met     Problem: Patient Education: Go to Patient Education Activity  Goal: Patient/Family Education  Outcome: Resolved/Met     Problem: Falls - Risk of  Goal: *Absence of Falls  Description: Document Khadra Fall Risk and appropriate interventions in the flowsheet.   Outcome: Resolved/Met     Problem: Patient Education: Go to Patient Education Activity  Goal: Patient/Family Education  Outcome: Resolved/Met     Problem: Nutrition Deficit  Goal: *Optimize nutritional status  Outcome: Resolved/Met     Problem: Patient Education: Go to Patient Education Activity  Goal: Patient/Family Education  Outcome: Resolved/Met     Problem: CABG: Post-Op Day 3  Goal: Off Pathway (Use only if patient is Off Pathway)  Outcome: Resolved/Met  Goal: Activity/Safety  Outcome: Resolved/Met  Goal: Consults, if ordered  Outcome: Resolved/Met  Goal: Diagnostic Test/Procedures  Outcome: Resolved/Met  Goal: Nutrition/Diet  Outcome: Resolved/Met  Goal: Discharge Planning  Outcome: Resolved/Met  Goal: Medications  Outcome: Resolved/Met  Goal: Respiratory  Outcome: Resolved/Met  Goal: Treatments/Interventions/Procedures  Outcome: Resolved/Met  Goal: Psychosocial  Outcome: Resolved/Met  Goal: *Hemodynamically stable  Outcome: Resolved/Met  Goal: *Lungs clear or at baseline  Outcome: Resolved/Met  Goal: *Optimal pain control at patient's stated goal  Outcome: Resolved/Met  Goal: *Incisions without signs and symptoms of wound complication  Outcome: Resolved/Met  Goal: *Demonstrates progressive activity  Outcome: Resolved/Met  Goal: *Tolerating diet  Outcome: Resolved/Met     Problem: CABG: Post-Op Day 4  Goal: Off Pathway (Use only if patient is Off Pathway)  Outcome: Resolved/Met  Goal: Activity/Safety  Outcome: Resolved/Met  Goal: Diagnostic Test/Procedures  Outcome: Resolved/Met  Goal: Nutrition/Diet  Outcome: Resolved/Met  Goal: Medications  Outcome: Resolved/Met  Goal: Discharge Planning  Outcome: Resolved/Met  Goal: Respiratory  Outcome: Resolved/Met  Goal: Treatments/Interventions/Procedures  Outcome: Resolved/Met  Goal: Psychosocial  Outcome: Resolved/Met  Goal: *Hemodynamically stable  Outcome: Resolved/Met  Goal: *Lungs clear or at baseline  Outcome: Resolved/Met  Goal: *Optimal pain control at patient's stated goal  Outcome: Resolved/Met  Goal: *Incisions without signs and symptoms of wound complication  Outcome: Resolved/Met  Goal: *Demonstrates progressive activity  Outcome: Resolved/Met  Goal: *Tolerating diet  Outcome: Resolved/Met     Problem: CABG: Post-Op Day 5  Goal: Off Pathway (Use only if patient is Off Pathway)  Outcome: Resolved/Met  Goal: Activity/Safety  Outcome: Resolved/Met  Goal: Diagnostic Test/Procedures  Outcome: Resolved/Met  Goal: Nutrition/Diet  Outcome: Resolved/Met  Goal: Discharge Planning  Outcome: Resolved/Met  Goal: Medications  Outcome: Resolved/Met  Goal: Respiratory  Outcome: Resolved/Met  Goal: Treatments/Interventions/Procedures  Outcome: Resolved/Met  Goal: Psychosocial  Outcome: Resolved/Met     Problem: CABG: Discharge Outcomes  Goal: *Hemodynamically stable  Outcome: Resolved/Met  Goal: *Stable cardiac rhythm  Outcome: Resolved/Met  Goal: *Lungs clear or at baseline  Outcome: Resolved/Met  Goal: *Demonstrates ability to perform prescribed activity without shortness of breath or discomfort  Outcome: Resolved/Met  Goal: *Verbalizes home exercise program, activity guidelines, cardiac precautions  Outcome: Resolved/Met  Goal: *Optimal pain control at patient's stated goal  Outcome: Resolved/Met  Goal: *Verbalizes understanding and describes prescribed diet  Outcome: Resolved/Met  Goal: *Verbalizes name, dosage, time, side effects, and number of days to continue medications  Outcome: Resolved/Met  Goal: *Weight  is stable  Outcome: Resolved/Met  Goal: *No signs and symptoms of infection or wound complications  Outcome: Resolved/Met  Goal: *Anxiety reduced or absent  Outcome: Resolved/Met  Goal: *Verbalizes and demonstrates incision care  Outcome: Resolved/Met  Goal: *Understands and describes signs and symptoms to report to providers(Stroke Metric)  Outcome: Resolved/Met  Goal: *Describes follow-up/return visits to physicians  Outcome: Resolved/Met  Goal: *Describes available resources and support systems  Outcome: Resolved/Met  Goal: *Expresses feelings about diagnosis and surgery  Outcome: Resolved/Met  Goal: *Influenza immunization  Outcome: Resolved/Met  Goal: *Pneumococcal immunization  Outcome: Resolved/Met     Problem: Patient Education: Go to Patient Education Activity  Goal: Patient/Family Education  Outcome: Resolved/Met     Problem: Pain  Goal: *Control of Pain  Outcome: Resolved/Met

## 2022-07-25 NOTE — PROGRESS NOTES
Wound Prevention Checklist    Patient: Fredy Reddy (49 y.o. female)  Date: 7/25/2022  Diagnosis: NSTEMI (non-ST elevated myocardial infarction) ([facility] [address] [I21.4] S/P CABG x 3    Precautions: Fall, Sternal       []  Heel prevention boots placed on patient    []  Patient turned q2h during shift    []  Lift team ordered    []  Patient on Garrett bed/Specialty bed    [x]  Each Wound is documented during shift (Stage, Color, drainage, odor, measurements, and dressings)    [x]  Dual skin checks done at bedside during shift report with Manolo Glover RN   Assumed care of pt, A/O x4, resting in bed, no c/o pain. 2229: Pt medicated per mar. 2358: Pt hygienic care done, medicated per mar. No complaints at this time. Sitting up in chair comfortably.

## 2022-07-25 NOTE — HOME CARE
Received home health referral for [facility] for (SN, PT, OT). Spoke with patient's daughter via phone;  patient identifiers verified. Explained home care services and routines. Demographics verified including insurance, phone and address confirmed. Patient has the following DME: rolling walker prior to discharge. Caregivers available will be staying at daughter's home and will be primary caregiver until fit to return back to own home. Orders noted and arranged to be processed to central intake.      ---   Florinda Farfan, LAURA  [facility] - INPATIENT Liaison

## 2022-07-25 NOTE — PROGRESS NOTES
Patient and daughter watched video 3 of 3 for discharge. No questions were asked upon completion. Discharge paperwork printed and given to pt.

## 2022-07-25 NOTE — PROGRESS NOTES
Problem: Pressure Injury - Risk of  Goal: *Prevention of pressure injury  Description: Document Vargas Scale and appropriate interventions in the flowsheet.   Outcome: Progressing Towards Goal  Note: Pressure Injury Interventions:  Sensory Interventions: Assess changes in LOC    Moisture Interventions: Absorbent underpads, Apply protective barrier, creams and emollients    Activity Interventions: PT/OT evaluation, Pressure redistribution bed/mattress(bed type)    Mobility Interventions: Pressure redistribution bed/mattress (bed type)    Nutrition Interventions: Document food/fluid/supplement intake    Friction and Shear Interventions: Minimize layers                Problem: Patient Education: Go to Patient Education Activity  Goal: Patient/Family Education  Outcome: Progressing Towards Goal     Problem: Patient Education: Go to Patient Education Activity  Goal: Patient/Family Education  Outcome: Progressing Towards Goal     Problem: Nutrition Deficit  Goal: *Optimize nutritional status  Outcome: Progressing Towards Goal     Problem: Patient Education: Go to Patient Education Activity  Goal: Patient/Family Education  Outcome: Progressing Towards Goal     Problem: CABG: Post-Op Day 3  Goal: Off Pathway (Use only if patient is Off Pathway)  Outcome: Progressing Towards Goal     Problem: CABG: Post-Op Day 4  Goal: Nutrition/Diet  Outcome: Progressing Towards Goal

## 2022-07-25 NOTE — DISCHARGE SUMMARY
CARDIAC SURGERY DISCHARGE SUMMARY    7/25/2022      CHIEF COMPLAINT: NSTEMI, chest pain      HISTORY OF PRESENT ILLNESS:  Spencer Baker is a 57 y.o. female patient of Dr. Tyshawn Dominguez who presented with chest pain and NSTEMI. Echocardiography revealed an ejection fraction of 55-60%. Cardiac catheterization showed  with 3-vessel CAD. She was admitted for surgical coronary revascularization . PAST MEDICAL HISTORY:   Past Medical History:   Diagnosis Date    Arthritis    . PAST SURGICAL HISTORY:   Past Surgical History:   Procedure Laterality Date    HX PARTIAL HYSTERECTOMY     . HOSPITAL COURSE:  She was admitted to the hospital and underwent CABG x 3 on 7/16/22 by Dr. Melinda Vazquez. She was extubated on the first night following surgery and was eventually up and ambulating well and taking a diet well. Wires and tubes were removed. Notable postoperative events included persistent PTX on CXR and unable to tolerate beta blocker due to HR and BP. Procedures:   Procedure(s):  CORONARY ARTERY BYPASS GRAFT (CABG) TIMES THREE      She is to be discharged to  today. At the time of discharge, her lungs were clear to auscultation bilaterally and the heart had a regular rate and rhythm. The sternum was stable and all wounds were well healed with no evidence of infection. There was no pedal edema. Room air oximetry was 97%.     LABS:  Lab Results   Component Value Date/Time    WBC 8.6 07/24/2022 06:56 AM    HCT 28.0 (L) 07/24/2022 06:56 AM     07/24/2022 06:56 AM      Lab Results   Component Value Date/Time     07/22/2022 05:34 AM    K 4.3 07/22/2022 05:34 AM     07/22/2022 05:34 AM    CO2 27 07/22/2022 05:34 AM     (H) 07/22/2022 05:34 AM    BUN 13 07/22/2022 05:34 AM    CREA 0.66 07/22/2022 05:34 AM    CREA 0.67 07/21/2022 05:05 AM    CREA 0.71 07/20/2022 02:15 AM       Imaging:n  ECHO ADULT COMPLETE    Result Date: 7/13/2022    Left Ventricle: Normal left ventricular systolic function with a visually estimated EF of 55 - 60%. Left ventricle size is normal. Normal wall thickness. Normal wall motion. Normal diastolic function. Mitral Valve: Mildly thickened leaflet. Mild regurgitation. DUPLEX CAROTID BILATERAL    Result Date: 7/16/2022  · Mild heterogenous plaque at the bifurcations and internal carotid arteries bilaterally, with less than 50% stenosis. · No evidence of significant stenosis in the external carotid arteries. · Antegrade vertebral arteries bilaterally. · Normal velocities in the subclavian arteries bilaterally. DISCHARGE DIAGNOSES:    CAD s/p CABG  Beta-blocker:  Contraindicated due to postoperative bradycardia, junctional rhythm and now borderline blood pressure    DISCHARGE DISPOSITION:  Activities: normal, with strict sternal precautions including no heavy lifting and with constant wearing of the sternal harness. Diet: Cardiac Diet  Condition: good  Prognosis:  good    DISCHARGE INSTRUCTIONS:  She is to follow up with the cardiac surgeon in 7 days and in one month, and will see the primary care physician and cardiologist preferably within one month. The visiting nurses will see her once home. Follow-up appointments: Follow-up Information       Follow up With Specialties Details Why Contact Info    Jackie Sneed MD Internal Medicine Physician   [facility]  [phone]              DISCHARGE MEDICATIONS:    Current Discharge Medication List        START taking these medications    Details   aspirin delayed-release 81 mg tablet Take 1 Tablet by mouth in the morning. Qty: 30 Tablet, Refills: 2  Start date: 7/26/2022      atorvastatin (LIPITOR) 40 mg tablet Take 1 Tablet by mouth nightly. Qty: 30 Tablet, Refills: 2  Start date: 7/25/2022      clopidogreL (PLAVIX) 75 mg tab Take 1 Tablet by mouth in the morning.   Qty: 30 Tablet, Refills: 2  Start date: 7/26/2022      HYDROcodone-acetaminophen (NORCO) 5-325 mg per tablet Take 1 Tablet by mouth every six (6) hours as needed for Pain for up to 7 days. Max Daily Amount: 4 Tablets.   Qty: 28 Tablet, Refills: 0  Start date: 7/25/2022, End date: 8/1/2022    Associated Diagnoses: S/P CABG x 3           STOP taking these medications       ibuprofen (MOTRIN) 600 mg tablet Comments:   Reason for Stopping:         cyclobenzaprine (FLEXERIL) 10 mg tablet Comments:   Reason for Stopping:         acetaminophen (TYLENOL) 325 mg tablet Comments:   Reason for Stopping:               Gogo Jose PA-C  [facility]  [phone]

## 2022-07-25 NOTE — PROGRESS NOTES
Discharge order noted for today. Pt has been accepted to [facility] agency. Spoke with the patient and she is agreeable to the transition plan today. Transport has been arranged through daughter. Patient's discharge summary and home health  orders have been forwarded to [facility] home health  agency via [employer]. Updated bedside RN, Albert Sebastian,  to the transition plan.   Discharge information has been documented on the AVS.       Cammy Schlatter, BSN, RN  Care Management

## 2022-07-25 NOTE — PROGRESS NOTES
Rolling walker delivered to patients room. Paperwork signed and returned with clinicals to [facility] office for Ree.        ERMA WeinsteinN, RN  Care Management

## 2022-07-26 ENCOUNTER — HOME CARE VISIT (OUTPATIENT)
Dept: SCHEDULING | Facility: HOME HEALTH | Age: 62
End: 2022-07-26
Payer: COMMERCIAL

## 2022-07-26 VITALS
OXYGEN SATURATION: 97 % | DIASTOLIC BLOOD PRESSURE: 63 MMHG | HEART RATE: 70 BPM | SYSTOLIC BLOOD PRESSURE: 110 MMHG | RESPIRATION RATE: 16 BRPM | TEMPERATURE: 97.8 F

## 2022-07-26 PROCEDURE — 400013 HH SOC

## 2022-07-26 PROCEDURE — G0299 HHS/HOSPICE OF RN EA 15 MIN: HCPCS

## 2022-07-26 NOTE — HOME HEALTH
Skilled services/Home bound verification:     Skilled Reason for admission/summary of clinical condition: S/P CABG x3.  57 y.o. admitted to the hospital on 7/13/22 and underwent CABG x 3 on 7/16/22 by Dr. Kera Jimenez. Per notes, \"She was extubated on the first night following surgery and was eventually up and ambulating well and taking a diet well. Wires and tubes were removed. \" Patient stabilized and discharged home on 7/25/22. Home health admit completed and paperwork signed. Patient is alert and oriented x 4, pleasant and cooperative. Patient has minimal chest pain when coughing and educated on sternal precautions. Patient taking pain meds as directed. Patient educated to take norco with food. Swallow the capsule or tablet whole to avoid exposure to overdose. Do not crush, chew, break, open, or dissolve. Monitor for respiratory depression and only take as directed by the dr. Side effects include Nausea, vomiting, constipation, lightheadedness, dizziness, or drowsiness may occur. Do not drink alcohol with this medication, do not operate heavy machinery. Lungs are clear in all fields, respiratory rate regular, pulse ox wnl. Heart rate regular, no edema present, no CP or heart palpitations. Bowel sounds present x 4, no n/v/d/c or s/s of UTI. Midline chest surgical incision, right lower leg medial lower area surgical incision, right lower leg medial upper surgical incision, and left lower leg medial surgical incision all open to air, no s/s of infection, staples intact. Patient on clopidogrel and educated to try and Take it at the same time each day. Take it with a full glass of water. You can take it with or without food. Use a pillbox to help keep track of your doses. If you miss a dose, Never take a double dose. Increased risk of bleeding. Increased bruising.      This patient is homebound for the following reasons Requires considerable and taxing effort to leave the home , Requires the assistance of 1 or more persons to leave the home  and Only leaves the home for medical reasons or [employer] services and are infrequent and of short duration for other reasons . Caregiver: relative. Caregiver assists with IADL's and ADL's. Medications reconciled and all medications are available in the home this visit. The following education was provided regarding medications: all medications reviewed and educated on to include: name, dose, route, frequency, side effects and effectiveness  Medications  are effective at this time. High risk medication teaching regarding anticoagulants, hyperglycemic agents or opiod narcotics performed (specify) norco and clopidogrel,    Jade Berry MD  notified of any discrepancies/look a like medications/medication interactions - none noted     Home health supplies by type and quantity ordered/delivered this visit include: n/a    Patient education provided this visit to include: see interventions      Patient level of understanding of education provided: see interventions    Sharps Education Provided: n/a  Patient response to procedure performed: tolerated assessment well    Home exercise program/Homework provided: For clopidogrel - Take it at the same time each day. Take it with a full glass of water. You can take it with or without food. Use a pillbox to help keep track of your doses. If you miss a dose, Never take a double dose. Increased risk of bleeding. Increased bruising. Report any s/sx of abnormal bleeding to MD immediately/seek medical treatment for any: black tary stools, dark/tea/blood, hemtoma, dizziness, frequent gum/nose bleeds, unusal brusing, or prolong bleeding. Pt/Caregiver instructed on plan of care and are agreeable to plan of care at this time.       Physician Jade Berry MD notified of patient admission to home health and plan of care including anticipated frequency of SN 1w3, 2prn and treatments/interventions/modalities of disease process management/education, pain management, incision management. Discharge planning discussed with patient and caregiver. Discharge planning as follows: discharge when all goals met. Pt/Caregiver did verbalize understanding of discharge planning. Next MD appointment TBD (date) with MD/NP/PA. Patient/caregiver encouraged/instructed to keep appointment as lack of follow through with physician appointment could result in discontinuation of home care services for non-compliance.

## 2022-07-27 ENCOUNTER — HOME CARE VISIT (OUTPATIENT)
Dept: SCHEDULING | Facility: HOME HEALTH | Age: 62
End: 2022-07-27
Payer: COMMERCIAL

## 2022-07-27 VITALS
RESPIRATION RATE: 16 BRPM | HEART RATE: 80 BPM | OXYGEN SATURATION: 93 % | DIASTOLIC BLOOD PRESSURE: 60 MMHG | TEMPERATURE: 98.1 F | SYSTOLIC BLOOD PRESSURE: 100 MMHG

## 2022-07-27 PROCEDURE — G0151 HHCP-SERV OF PT,EA 15 MIN: HCPCS

## 2022-07-27 NOTE — Clinical Note
During the PT evaluation today, after ambulating a short distance, pt became very anxious and stated that she could not get any air. Sat patient down and she was clutching her throat and stating something is wrong and she could not breathe. Called 911 and EMS services evaluated her and ran EKG, which showed no abnormalities. Pt. calmed down and started to breathe normally again. Vitals were: /60, HR 80, O2 SAT 93%    Please do not hesitate to contact me should you have any additional questions.     Kwadwo Huynh, MPT  Physical Therapist

## 2022-07-27 NOTE — Clinical Note
Therapy Functional Score Assessment  Question   Score   Grooming  2       Upper Dressing 2      Lower Dressing 2      Bathing  5      Toilet Transfer  1    Transfer  2            Ambulation  3   Dyspnea                     1       Pain Interfering with activity 3  Est number therapy visits      7

## 2022-07-27 NOTE — HOME HEALTH
PT INITIAL EVALUATION    Past Medical Hx:   NSTEMI (non-ST elevated myocardial infarction) ([facility] [address] 7/13/2022   S/P CABG x 3     Recent H/o current illness:  57 year old female presents with MD referral for HHPT s/p hospitalization due to NSTEMI and CABGx3  Medication Management: patient manages own medications  Social hx and home eval:  Pt lives in apartment on second floor with daughter. Caregiver Involvement: assist with medical appointments, some ADL's   PLOF:  Pt PLOF is ambulation w no AD, pts base level of function independent with all ADL's  BALANCE:     Seated unsupported balance is good   Standing static balance is fair  Standing dynamic balance is fair-  Tinetti 15 /28  Patient is at risk for falls due to recent hospitalization  BLE Strength:  Left Hip flexion 3/5 , hip abduction 3/5, hip adduction 3/5, Knee flexion 3/5  knee extension 3/5, ankle dorsiflexion 3/5  Right Hip flexion 3/5 , hip abduction 3/5, hip adduction 3/5, Knee flexion 3/5  knee extension 3/5, ankle dorsiflexion 3/5  BLE ROM:  Right hip/knee/ankle: WFL  Left hip/knee/ankle: WFL  Bed mobility:  min A   Transfers:  min A with sit<->stand for bed to chair, with FWW AD. min cues and instruction needed for safety and sequencing  GAIT:  Patient ambulated  25 ft  with FWW  on level  surfaces min A. Pt demonstrates with decreased hip and knee flexion on BLE in pre and mid swing phase of gait as well as decreased stride-length and ayla. Patient is unable to safely ambulate without assistance at this time. After walking 25 feet, patient became very SOB and could not breath. Pt was grasping her throat indicating she could not get any air. Pt. eventually calmed down and she was able to breathe again. Called 911 and EMS responded. EMS evaluated her, ran EKG which was normal.  Pt required min cues for  safety and sequencing  Stairs: NT  Patient education provided this visit: Safety with functional mobility and instruction with HEP.    Patient level of understanding of education provided: good ,able to return demonstrate mobility instruction and HEP with min assistance  Patient response to treatment:  Well with no c/o increased pain  Assessment: Referral for HHPT following recent hospitalization due to NSTMI and CABG x 3. HHPT is medically necessary to address the following clinical findings: decreased BLE strength and ROM, impaired gait, decreased I and safety with transfers and gait, decreased endurance, decreased balance and decreased safety in order to improve functional mobility and decrease fall risk. Pt is a fall risk as indicated by Tinetti score of 15/28. Patient will be seen for HHPT 2w3, 1w1 for therapeutic exercises to increase BLE strength and ROM,  training for gait, stairs and transfers to increase I and safety with daily functional mobility and balance and endurance activities to decrease fall risk, decrease impairment and increase functional mobility and independence in the home. Pt. requires skilled PT intervention to instruct Pt. with gait training with LRAD, ROM and strengthening, stair training, transfer training, balance training, manual therapy, neuromuscular re-education, patient care-giver education, HEP, endurance training, body mechanics. Instructed patient with HEP for BLE strengthening and left HEP handout for the patient. Patient was able to return demonstrate the exercises given. HEP includes:   Pt. received therapeutic exercises which included:  Squats, marching in place, Hip abd/add, toe raises and hip ext.  x 10, 3 times/day

## 2022-07-28 ENCOUNTER — HOME CARE VISIT (OUTPATIENT)
Dept: HOME HEALTH SERVICES | Facility: HOME HEALTH | Age: 62
End: 2022-07-28
Payer: COMMERCIAL

## 2022-07-29 ENCOUNTER — HOME CARE VISIT (OUTPATIENT)
Dept: HOME HEALTH SERVICES | Facility: HOME HEALTH | Age: 62
End: 2022-07-29
Payer: COMMERCIAL

## 2022-07-29 ENCOUNTER — HOME CARE VISIT (OUTPATIENT)
Dept: SCHEDULING | Facility: HOME HEALTH | Age: 62
End: 2022-07-29
Payer: COMMERCIAL

## 2022-07-29 PROCEDURE — G0157 HHC PT ASSISTANT EA 15: HCPCS

## 2022-07-29 NOTE — HOME HEALTH
S:  Pt doing well c/o 2/10 left knee pain, no changes in medications, no falls reported  O:  see interventions   A:  EDUCATION: Pt instructed in fall prevention, energy conservation, sternal precautions       RESPONSE TO ED:  Pt able to verbalize fall prevention and sternal precautions        RESPONSE TO TX:   Pt tolerated tx without increased pain, RPE 5 following tx session, pt amb very slowly VCs to correct with good return demonstration   p:   Next visit will address stair negotiation, strength, gait on uneven surfaces

## 2022-08-01 VITALS
SYSTOLIC BLOOD PRESSURE: 110 MMHG | HEART RATE: 71 BPM | TEMPERATURE: 98.3 F | DIASTOLIC BLOOD PRESSURE: 68 MMHG | OXYGEN SATURATION: 99 %

## 2022-08-02 ENCOUNTER — OFFICE VISIT (OUTPATIENT)
Dept: CARDIOTHORACIC SURGERY | Age: 62
End: 2022-08-02
Payer: MEDICAID

## 2022-08-02 VITALS
HEART RATE: 70 BPM | OXYGEN SATURATION: 99 % | WEIGHT: 116 LBS | DIASTOLIC BLOOD PRESSURE: 62 MMHG | BODY MASS INDEX: 21.35 KG/M2 | HEIGHT: 62 IN | TEMPERATURE: 98.1 F | SYSTOLIC BLOOD PRESSURE: 104 MMHG

## 2022-08-02 DIAGNOSIS — Z95.1 S/P CABG X 3: Primary | ICD-10-CM

## 2022-08-02 PROCEDURE — 99024 POSTOP FOLLOW-UP VISIT: CPT | Performed by: PHYSICIAN ASSISTANT

## 2022-08-02 NOTE — PATIENT INSTRUCTIONS
Continue to increase her activity level maintaining sternal precautions. Leg elevation when possible. Shower daily. No soaking baths until Steri-Strips are off incisions. Follow-up with cardiology and our service as scheduled.

## 2022-08-02 NOTE — PROGRESS NOTES
[facility] Progress Note          Today's date: 8/2/2022    Interval History: This is the first postoperative visit after discharge on 7/25/2022 following CABG x3 on 7/16/2022 by Dr. Mirian Nichols. She is progressing very well at home. Her appetite is adequate and she is moving her bowels. She has good pain control with a periodic pain pill. Her endurance is increasing. She has been walking in the house, outside and at the store. She has scant cough but denies shortness of breath at rest.  She denies any issues with her incisions. She has a new lump and skin abrasion under her left breast.  Its not near our surgical sites. Her PCP is working this up. Assessment:     Satisfactory progress after CABG x3. Plan:     1. Leg staples removed and Steri-Strips placed. No soaking baths until Steri-Strips are completely off. Leg elevation when possible. 2.  Continue current medications. Consider low-dose beta-blocker at follow-up appointment. 3.  Continue to increase activity while maintaining sternal precautions. 4.  Follow-up with cardiology, PCP and our service as scheduled. Subjective:     Chief Complaint: None    Status post CABG x3    Objective:     Admission Weight: Last Weight   Weight: 52.6 kg (116 lb) Weight: 52.6 kg (116 lb)     No flowsheet data found. Visit Vitals  /62 (BP 1 Location: Left upper arm, BP Patient Position: Sitting)   Pulse 70   Temp 98.1 °F (36.7 °C) (Temporal)   Ht 5' 2\" (1.575 m)   Wt 52.6 kg (116 lb)   SpO2 99%   BMI 21.22 kg/m²       BP Readings from Last 3 Encounters:   08/02/22 104/62   07/29/22 110/68   07/27/22 100/60     Wt Readings from Last 3 Encounters:   08/02/22 52.6 kg (116 lb)   07/20/22 55.8 kg (123 lb)   10/11/17 49.9 kg (110 lb)       Physical Exam:  General: Well-appearing. No apparent distress. Self ambulates. Neck: No JVD or tracheal deviation.   Lungs: Clear to auscultation without wheezes, rales or rhonchi. Chest: No bony instability. Incisions healing well. Quarter sized skin abrasion with palpable firm nodule underneath. No warmth or tenderness. No drainage on bandage. Heart: Regular rate and rhythm without murmur or rub. PMI not displaced. Abdomen: Not distended. Active sounds. Soft and nontender. Extremities: Warm and well perfused. Bilateral incisions healing well. Bilateral pretibial edema. Neuro: Moves all extremities x4 strong and equal.  No deficit appreciated. Robert Patel PA-C    PLEASE NOTE:  This document has been produced using voice recognition software. Unrecognized errors in transcription may be present. NOTE TO PATIENT:  The purpose of this note is to communicate optimally with the other providers involved in your care. It is written using standard medical terminology. If you have questions regarding details of the note please call my office at [phone] and make an appointment to discuss your concerns.

## 2022-08-03 ENCOUNTER — HOME CARE VISIT (OUTPATIENT)
Dept: SCHEDULING | Facility: HOME HEALTH | Age: 62
End: 2022-08-03
Payer: COMMERCIAL

## 2022-08-03 ENCOUNTER — TRANSCRIBE ORDER (OUTPATIENT)
Dept: SCHEDULING | Age: 62
End: 2022-08-03

## 2022-08-03 VITALS
HEART RATE: 78 BPM | DIASTOLIC BLOOD PRESSURE: 58 MMHG | TEMPERATURE: 97.9 F | OXYGEN SATURATION: 98 % | SYSTOLIC BLOOD PRESSURE: 118 MMHG | RESPIRATION RATE: 16 BRPM

## 2022-08-03 VITALS
RESPIRATION RATE: 18 BRPM | OXYGEN SATURATION: 98 % | HEART RATE: 74 BPM | TEMPERATURE: 97.3 F | SYSTOLIC BLOOD PRESSURE: 112 MMHG | DIASTOLIC BLOOD PRESSURE: 60 MMHG

## 2022-08-03 DIAGNOSIS — Z12.31 ENCOUNTER FOR SCREENING MAMMOGRAM FOR MALIGNANT NEOPLASM OF BREAST: Primary | ICD-10-CM

## 2022-08-03 DIAGNOSIS — N63.20 BREAST MASS, LEFT: Primary | ICD-10-CM

## 2022-08-03 DIAGNOSIS — N63.20 BREAST MASS, LEFT: ICD-10-CM

## 2022-08-03 PROCEDURE — G0299 HHS/HOSPICE OF RN EA 15 MIN: HCPCS

## 2022-08-03 PROCEDURE — G0157 HHC PT ASSISTANT EA 15: HCPCS

## 2022-08-03 PROCEDURE — G0152 HHCP-SERV OF OT,EA 15 MIN: HCPCS

## 2022-08-03 NOTE — CASE COMMUNICATION
Mrs. Bartolo Mccracken presents with good rehab potential to increase her strength, and endurance however appears at her maximal potential with occupational therapy with her current sternal precautions. She is able to complete ADLs and functional mobility with modified independence to supervision. She has family support as needed for IADLs and is able to complete simple meal prep. Pt agreeable to no further skilled home health occupational therapy  needs.      PLAN: D/C 1w1 with pt to discharge with HHPT and SN

## 2022-08-03 NOTE — HOME HEALTH
Clinical Condition per EPIC:  57 y.o. admitted to the hospital on 7/13/22 and underwent CABG x 3 on 7/16/22 by Dr. Aubree Troy. Per notes, \"She was extubated on the first night following surgery and was eventually up and ambulating well and taking a diet well. Wires and tubes were removed. \" Patient stabilized and discharged home on 7/25/22    Past Medical Hx: NSTEMI (non-ST elevated myocardial infarction) ([address] 7/13/2022 S/P CABG x 3    Medication Management: Patient manages own medications and reports she is taking HYDROcodone-acetaminophen (NORCO) 5-325 mg per tablet. Educated to continue as directed per MD.  PLOF: Pt lives in apartment on second floor with daughter. She previously was independent with ADLs and functional mobility with no assistive device    Caregiver Involvement: Pt daughter assist with medical appointments, IADLs and some ADL's     SUBJECTIVE: Pt reports slight discomfort in chest but presents with good understanding of her sternal precautions. Pt reports ROM in BUE with no concenrs and WFL. Her BUE ROM/MMT not tested due to sternal precautions   DME ORDERED/RECOMMENEDED: N/A    OBJECTIVE:  BATHING: Pt plans to take first shower today now that it is apporved bt MD. Pt modified independent with sponge bathing standing at sink following sternal precautions. Pt has tub shower unit with shower chair. TOILETING: Pt modified independent for toileting with all aspects from regular toilet  UB DRESSING: Pt modified independent for upper body dressing  LB DRESSING: Pt modified independent for lower body dressing using figure 4 dressing technique to lori/doff socks, shoes and pants.    GROOMING: Pt modified independent for grooming tasks for oral care, hair care, and washing face/hands  FEEDING: Pt independent for self feeding    Modified Aidan RPE 1/10 after performing ambulation, transfers, and I/ADL assessment     OT instructed/demonstrated pt the following with good understanding:     IADL: Pt daughter assists with cooking, cleaning, shopping, medications and transportation. Pt able to complete simple meal prep as needed. AMBULATION: Pt supervision for ambulating short house hold distances using no assisitice device  EOB/BED TRANSFER: Pt independent for bed mobility  COUCH: Pt modified independnece for sit to stand transfers using no assistive device  TOILET: Pt modified independent for toilet transfers using no assistive deivce  TUB SHOWER:Pt supeervision with tub shower transfers with shower chair. PATIENT RESPONSE TO TREATMENT: Pt responded well to home health occupational therapy assessment with good saftey awarness and awarness of her sternal precautions post CABG. PATIENT EDUCATION PROVIDED THIS VISIT: OT role, energy conservation, fall prevention/safety training ADL/IADL tasks, continue diet and medications as instructed per MD, consult MD or urgent care for medical assistance as opposed to ER unless situation emergent. PATIENT LEVEL OF UNDERSTANDING OF EDUCATION PROVIDED: Pt able to teach back role of OT with good understanding. Pt able to teach back energy conservation techniques with handout for reference. Pt able to teach back fall hazards including clothing on floor and agreeable to remove. Valeri Graves presents with good rehab potential to increase her strength, and endurance however appears at her maximal potenital with occupational therapy with her current sternal precautions. She is able to complete ADLs and functional mobility with modified independence to supervision. She has family support as needed for IADLs and is able to complete simple meal prep. Pt agreeable to no further skilled home health occupational therapy needs. HOME EXERCISE PROGRAM:N/A    ASSESSMENT: Pt presents with fair+ balance with pt ambulating around the home and demonstrating transfers with no assistive device or any loss of balance.  Pt MMT/ROM in her BUE not completly tested due to her sternal precautions however reports no BUE deficts or pain. Pt is able to complete her ADLs with modified independence to superviions. Pt has been sponge bathing but is able to cmoplete tub transfers with supervision and has shower chair for seated rest breaks. She presents with good endurance levels with modifed perez scale of 1/10 after ADL/functional mobility assessments. Pt with good understanding of pacing activities to avoid over fatigue. Pt with no home health skilled OT services at this time. Skilled Care Provided: Pt completed full occupational therapy assessment to include balance, coordination, strengthening, ADL education/training, functional mobility and home safety.     PLAN: D/C 1w1 with pt to discharge with HHPT and SN

## 2022-08-03 NOTE — Clinical Note
Therapy Functional Score Assessment  Question                                            Score   Grooming                            0       Upper Dressing   0      Lower Dressing   0      Bathing                 2      Toilet Transfer                   0    Transfer                0            Ambulation                         3   Dyspnea                     2       Pain Interfering with activity        3  Est number therapy visits      1    Mrs. Coker Fails presents with good rehab potential to increase her strength, and endurance however appears at her maximal potential with occupational therapy with her current sternal precautions. She is able to complete ADLs and functional mobility with modified independence to supervision. She has family support as needed for IADLs and is able to complete simple meal prep. Pt agreeable to no further skilled home health occupational therapy needs.      PLAN: D/C 1w1 with pt to discharge with HHPT and SN

## 2022-08-04 NOTE — HOME HEALTH
Skilled reason for visit: disease management/education    Caregiver involvement: daughter assists with IADL's and ADL's as needed. Medications reviewed and all medications are available in the home this visit. The following education was provided regarding medications:  clopidogrel and norco reviewed and educated on to include: name, dose, route, frequency, side effects and effectiveness. MD notified of any discrepancies/look a-like medications/medication interactions: none noted  Medications are effective at this time. Home health supplies by type and quantity ordered/delivered this visit include: n/a    Patient education provided this visit: see interventions    Sharps education provided: n/a    Patient level of understanding of education provided: see interventions    Skilled Care Performed this visit: Patient on couch at nursing arrival. Patient is alert and oriented x4, pleasant and cooperative. Patient staples removed by physician on Monday. Sites are well approximated and some scabbing. Pics taken and uploaded. Patient denies any pain. Vs wnl, lungs clear, heart rate regular, bowel sounds present x 4, med rec performed. Patient response to procedure performed:  tolerated assessment well    Agency Progress toward goals: progressing towards agency goals    Patient's Progress towards personal goals: progressing towards personal goals    Home exercise program: Report any s/sx of abnormal bleeding to MD immediately/seek medical treatment for any: black tary stools, dark/tea/blood, hemtoma, dizziness, frequent gum/nose bleeds, unusal brusing, or prolong bleeding.     Continued need for the following skills: Nursing and Physical Therapy    Plan for next visit: assessment, vitals, med rec, discipline d/c    Patient and/or caregiver notified and agrees to changes in the Plan of Care YES/NO/NA: N/A      The following discharge planning was discussed with the pt/caregiver: discharge when all goals met

## 2022-08-05 ENCOUNTER — HOME CARE VISIT (OUTPATIENT)
Dept: SCHEDULING | Facility: HOME HEALTH | Age: 62
End: 2022-08-05
Payer: COMMERCIAL

## 2022-08-05 VITALS
OXYGEN SATURATION: 100 % | DIASTOLIC BLOOD PRESSURE: 66 MMHG | TEMPERATURE: 97.7 F | HEART RATE: 65 BPM | SYSTOLIC BLOOD PRESSURE: 126 MMHG

## 2022-08-05 VITALS
DIASTOLIC BLOOD PRESSURE: 58 MMHG | OXYGEN SATURATION: 96 % | SYSTOLIC BLOOD PRESSURE: 118 MMHG | HEART RATE: 72 BPM | TEMPERATURE: 98.7 F

## 2022-08-05 PROCEDURE — G0157 HHC PT ASSISTANT EA 15: HCPCS

## 2022-08-05 NOTE — HOME HEALTH
S:  Pt states she is doing very well, no c/o pain pt has new prescripition for [facility] 5.325 as needed for pain.  Nursing present during tx will add Norco to med list       Pt is Ind with ADLs  O:  See Interventions  A:  EDUCATION: reviewed fall prevention       RESPONSE TO ED: pt able to reach back fall prevention       RESPONSE TO TX: Pt progressing well toward established goals, no pain or fatigue reported  P:  Next visit will address stair negotiation, gait outside on uneven surfaces

## 2022-08-05 NOTE — HOME HEALTH
S:  Pt doing well, no c/o pain, no changes in medications       Pt is Ind with Bathing and Dressing, family is assisting with meals  O:  See Interventions  Assessment and Summary of Care:  Patient's current functional status before discharge is as follows  Strength: 4/5 BLE  ROM:  WNL  Bed Mobility: IND  Transfers: Ind from multiple surfaces in home  Gait/WC mobility: 500ft outside on uneven surfaces Ind  Stairs: Negotiates 14 Steps Mod I with rail  Special Tests: TINETTI 27/28  Recommendations: Recommend DC next visit

## 2022-08-08 ENCOUNTER — HOME CARE VISIT (OUTPATIENT)
Dept: SCHEDULING | Facility: HOME HEALTH | Age: 62
End: 2022-08-08
Payer: COMMERCIAL

## 2022-08-08 PROCEDURE — G0157 HHC PT ASSISTANT EA 15: HCPCS

## 2022-08-09 ENCOUNTER — HOME CARE VISIT (OUTPATIENT)
Dept: HOME HEALTH SERVICES | Facility: HOME HEALTH | Age: 62
End: 2022-08-09
Payer: COMMERCIAL

## 2022-08-10 ENCOUNTER — HOME CARE VISIT (OUTPATIENT)
Dept: SCHEDULING | Facility: HOME HEALTH | Age: 62
End: 2022-08-10
Payer: COMMERCIAL

## 2022-08-10 PROCEDURE — G0299 HHS/HOSPICE OF RN EA 15 MIN: HCPCS

## 2022-08-11 NOTE — HOME HEALTH
S: No complaints of pain, no changes in medications, no falls reported      Ind ADLs  O: See intervention  A: Pt has progressed well toward established goals demonstrated improved balance evident by TU,TINETTI:  28/, MCCULLOUGH,FTSTS: 12, safely negotiates 1 flight of stairs, Ind transfers       from multiple surfaces in home, tolerated up to 500ft of amb on uneven surfaces no c/o fatigue or shortness of breath.    P: Next visit will address strength, Ind with HEP, pt has been prepared for DC next week

## 2022-08-15 ENCOUNTER — HOME CARE VISIT (OUTPATIENT)
Dept: SCHEDULING | Facility: HOME HEALTH | Age: 62
End: 2022-08-15
Payer: COMMERCIAL

## 2022-08-15 ENCOUNTER — OFFICE VISIT (OUTPATIENT)
Dept: CARDIOLOGY CLINIC | Age: 62
End: 2022-08-15
Payer: MEDICAID

## 2022-08-15 VITALS
OXYGEN SATURATION: 99 % | RESPIRATION RATE: 16 BRPM | DIASTOLIC BLOOD PRESSURE: 70 MMHG | HEART RATE: 76 BPM | TEMPERATURE: 97 F | SYSTOLIC BLOOD PRESSURE: 110 MMHG

## 2022-08-15 VITALS
RESPIRATION RATE: 16 BRPM | SYSTOLIC BLOOD PRESSURE: 94 MMHG | DIASTOLIC BLOOD PRESSURE: 64 MMHG | HEART RATE: 78 BPM | TEMPERATURE: 97.6 F | OXYGEN SATURATION: 99 %

## 2022-08-15 VITALS
BODY MASS INDEX: 20.06 KG/M2 | OXYGEN SATURATION: 100 % | DIASTOLIC BLOOD PRESSURE: 68 MMHG | HEIGHT: 62 IN | HEART RATE: 81 BPM | WEIGHT: 109 LBS | SYSTOLIC BLOOD PRESSURE: 116 MMHG

## 2022-08-15 DIAGNOSIS — I25.83 CORONARY ARTERY DISEASE DUE TO LIPID RICH PLAQUE: Primary | ICD-10-CM

## 2022-08-15 DIAGNOSIS — Z72.0 TOBACCO ABUSE: ICD-10-CM

## 2022-08-15 DIAGNOSIS — I25.10 CORONARY ARTERY DISEASE DUE TO LIPID RICH PLAQUE: Primary | ICD-10-CM

## 2022-08-15 DIAGNOSIS — E78.00 PURE HYPERCHOLESTEROLEMIA: ICD-10-CM

## 2022-08-15 PROCEDURE — G0151 HHCP-SERV OF PT,EA 15 MIN: HCPCS

## 2022-08-15 PROCEDURE — 99214 OFFICE O/P EST MOD 30 MIN: CPT | Performed by: INTERNAL MEDICINE

## 2022-08-15 NOTE — PROGRESS NOTES
Sameera Johnson presents today for   Chief Complaint   Patient presents with    Follow-up     2-3 week Jason 30 - had Bypass (07-20-22 : CABG x3)       Sameera Johnson preferred language for health care discussion is english/other. Is someone accompanying this pt? yes    Is the patient using any DME equipment during [address]? no    Depression Screening:  3 most recent PHQ Screens 8/15/2022   Little interest or pleasure in doing things Not at all   Feeling down, depressed, irritable, or hopeless Not at all   Total Score PHQ 2 0       Learning Assessment:  Learning Assessment 8/15/2022   PRIMARY LEARNER Patient   PRIMARY LANGUAGE ENGLISH   LEARNER PREFERENCE PRIMARY DEMONSTRATION   ANSWERED BY patient   RELATIONSHIP SELF       Abuse Screening:  Abuse Screening Questionnaire 8/15/2022   Do you ever feel afraid of your partner? N   Are you in a relationship with someone who physically or mentally threatens you? N   Is it safe for you to go home? Y             Pt currently taking Anticoagulant therapy? No    Pt currently taking Antiplatelet therapy ? Plavix 75 mg once a day and ASA 81 mg once a day      Coordination of Care:  1. Have you been to the ER, urgent care clinic since your last visit? Hospitalized since your last visit? yes    2. Have you seen or consulted any other health care providers outside of the [address] [place] [address] since your last visit? Include any pap smears or colon screening.  no

## 2022-08-15 NOTE — Clinical Note
Ms. Linda Patel has been clinically discharged and documentation finalized for completion of PT discharge. Caregiver involvement: Pt daughter is primary caregiver at this time and has been assisting with medical appointments and some ADL support  Medications reconciled and all medications are available in the home this visit. The following education was provided regarding medications, medication interactions, and look a like medications: NA.  Medications  are effective at this time. Home health supplies by type and quantity ordered/delivered this visit include: NA  Patient education provided this visit: safety with functional mobility  Current Functional Status and progress towards goals:  Strength: Pt. BLE strength is 4/5 which is an improvement from the initial evaluation strength of 3/5. This allows the patient increased functional independence and mobility  BED MOBILITY: Pt. is independent with all bed mobility  which demonstrates an improvement from the initial evaluation of min A. This allows for the patient to be functionally more independent. TRANSFERS: Pt. is independent with all transfers which demonstrates and improvement from the initial evaluation score of min A. This allows the patient increased functional independence and mobility  GAIT/WC MOBILITY: Pt. is able to ambulate >1000 feet outside over even and uneven surfaces with independence A with no AD. This represents an improvement from the initial evaluation of 25 feet with FWW AD on level surfaces with min A. STAIRS: 12+ stairs independently  BALANCE: Patient's final tinetti is 28/28 which is an improvement from the initial evaluation score of 15/28. This allows the patient to be functionally more independent and have a decreased fall risk  Progress toward goals: Patient has met all goals, see interventions for details. Pt. was able to return demonstrate all mobility training and HEP shown independently.    Patient response to treatment and education: Patient tolerated treatment well, no complaint of new pain noted post treatment. Home exercise program: Patient has been given a HEP and patient/caregiver understand the HEP. Patient is doing the HEP 1-2/day as able  Continued need for the following skills:  NA patient is final DC from PT today   The following discharge planning was discussed with the pt/caregiver: DC from agency    Thank you for your referral of this patient.     Sincerely,    Katy Olsen, JACKELIN  Physical Therapist

## 2022-08-15 NOTE — PROGRESS NOTES
[facility]    Daun Ormond is 49-year-old female with history of CAD status post CABG, hyperlipidemia and tobacco use disorder    Patient is here today for follow-up appointment for her CAD, hyperlipidemia  Patient was admitted to hospital in 07/2022 with unstable angina and non-STEMI. Cardiac catheterization was performed which showed severe three-vessel coronary disease. Patient eventually underwent CABG x3 with LIMA to LAD, reverse SVG to diagonal branch and OM branch in 07/2022    Patient is recovering well. She is denying any anginal symptoms. She denies any chest pain or chest tightness. She is taking her medication as prescribed. She denies lower extremity swelling or PND. She has appointment with CT surgery team in 2 weeks  Denies any nausea, vomiting, abdominal pain, fever, chills, sputum production. No hematuria or other bleeding complaints    Past Medical History:   Diagnosis Date    Arthritis        Review of Systems:  Cardiac symptoms as noted above in HPI. All others negative. Denies fatigue, malaise, skin rash, joint pain, blurring vision, photophobia, neck pain, hemoptysis, chronic cough, nausea, vomiting, hematuria, burning micturition, BRBPR, chronic headaches. Current Outpatient Medications   Medication Sig    aspirin delayed-release 81 mg tablet Take 1 Tablet by mouth in the morning. atorvastatin (LIPITOR) 40 mg tablet Take 1 Tablet by mouth nightly. clopidogreL (PLAVIX) 75 mg tab Take 1 Tablet by mouth in the morning. No current facility-administered medications for this visit.        Past Surgical History:   Procedure Laterality Date    HX PARTIAL HYSTERECTOMY         Allergies and Sensitivities:  No Known Allergies    Family History:  Family History   Problem Relation Age of Onset    Heart Disease Maternal Grandmother     Heart Attack Daughter        Social History:  Social History     Tobacco Use Smoking status: Former     Packs/day: 0.50     Types: Cigarettes     Quit date: 2022     Years since quittin.0     Passive exposure: Past    Smokeless tobacco: Never   Vaping Use    Vaping Use: Never used   Substance Use Topics    Alcohol use: No    Drug use: No     She  reports that she quit smoking about 4 weeks ago. Her smoking use included cigarettes. She smoked an average of .5 packs per day. She has been exposed to tobacco smoke. She has never used smokeless tobacco.  She  reports no history of alcohol use. Physical Exam:  BP Readings from Last 3 Encounters:   08/15/22 116/68   08/10/22 110/70   22 126/66         Pulse Readings from Last 3 Encounters:   08/15/22 81   08/10/22 76   22 65          Wt Readings from Last 3 Encounters:   08/15/22 49.4 kg (109 lb)   22 52.6 kg (116 lb)   22 55.8 kg (123 lb)       Constitutional: Oriented to person, place, and time. HENT: Head: Normocephalic and atraumatic. Eyes: Conjunctivae and extraocular motions are normal.   Neck: No JVD present. Carotid bruit is not appreciated. Cardiovascular: Regular rhythm. No murmur, gallop or rubs appreciated  Lung: Breath sounds normal. No respiratory distress. No ronchi or rales appreciated  Abdominal: No tenderness. No rebound and no guarding. Musculoskeletal: There is no lower extremity edema.  No cynosis    LABS:   @  Lab Results   Component Value Date/Time    WBC 8.6 2022 06:56 AM    Hemoglobin, POC 8.3 (L) 2022 04:55 AM    HGB 8.9 (L) 2022 06:56 AM    Hematocrit, POC 24 (L) 2022 04:55 AM    HCT 28.0 (L) 2022 06:56 AM    PLATELET 545  06:56 AM    MCV 97.6 2022 06:56 AM     Lab Results   Component Value Date/Time    Sodium 137 2022 05:34 AM    Potassium 4.3 2022 05:34 AM    Chloride 104 2022 05:34 AM    CO2 27 2022 05:34 AM    Glucose 105 (H) 2022 05:34 AM    BUN 13 2022 05:34 AM    Creatinine 0.66 2022 05:34 AM     Lipids Latest Ref Rng & Units 7/14/2022 7/13/2022   Chol, Total <200 MG/ 138   HDL 40 - 60 MG/DL 43 44   LDL 0 - 100 MG/DL 73.8 83.6   Trig <150 MG/DL 51 52   Chol/HDL Ratio 0 - 5.0   3.0 3.1   Some recent data might be hidden     Lab Results   Component Value Date/Time    ALT (SGPT) 21 07/17/2022 04:44 AM     Lab Results   Component Value Date/Time    Hemoglobin A1c 5.9 (H) 07/13/2022 10:31 AM     Lab Results   Component Value Date/Time    TSH 0.68 07/13/2022 04:52 PM       07/13/22    ECHO ADULT COMPLETE 07/13/2022 7/13/2022    Left Ventricle: Normal left ventricular systolic function with a visually estimated EF of 55 - 60%. Left ventricle size is normal. Normal wall thickness. Normal wall motion. Normal diastolic function. Mitral Valve: Mildly thickened leaflet. Mild regurgitation. CATH (07/2022)  Left Main   The vessel is angiographically normal.   Left Anterior Descending   Angiography calcification noted. Mid LAD has a diffuse up to 70-75% long disease. Distal vessel appeared approximately 2 mm in size without any significant obstructive disease. Diagonal branch: 99% ostial disease, bifurcating vessel. Approximately 1.5-1.75 mm vessel. Left Circumflex   Ostial and proximal 90% stenosis of native LCx. High OM branch is 95-99% stenosis with faint forward flow. This vessel appears very small caliber OM 2 and OM 3: Small caliber vessel with luminal irregularities   Right Coronary Artery   Caliber vessel. Proximal and mid diffuse disease. Distally severe disease with percent occlusion. There is evidence of left to right collateral.     Left Ventricle The left ventricular systolic function is normal. LV end diastolic pressure is normal. LV EDP is: 12. The estimated EF = grossly normal. There is no evidence of mitral regurgitation. Aortic Valve There is no aortic valve stenosis.      IMPRESSION & PLAN:  Ms. Eze Sosa is 68-year-old female    CAD:  Non-STEMI in 07/2022, cardiac catheterization as mentioned above with three-vessel CAD  Status post CABG x3 with LIMA to LAD, reverse SVG to diagonal and OM in 07/2022  Currently on aspirin, Plavix and statin. No angina. Would recommend outpatient cardiac rehabilitation therapy once okay by CT surgery team in 2 weeks follow-up appointment. Hyperlipidemia:  Currently on atorvastatin 40 mg daily. Recommend to continue same    Tobacco abuse disorder:  Patient just quit smoking after recent CABG. Patient used to smoke 20-pack-year before that  Patient educated for consequences/sequela and risk of smoking including but not limited to CAD/PAD/stroke/COPD/lung cancer/other cancer and death. Patient understood completely and expressed and verbalized understanding. Today I had a lengthy discussion with the patient for more than 5 minutes discussing about importance of smoking cessation. Counseling was offered. Patient is working towards that goal.      This plan was discussed with patient who is in agreement. Thank you for allowing me to participate in patient care. Please feel free to call me if you have any question or concern. Trista Sharif MD  Please note: This document has been produced using voice recognition software. Unrecognized errors in transcription may be present.

## 2022-08-15 NOTE — HOME HEALTH
DC ACTIONS NARRATIVE  Pt. clinically discharged and documentation finalized for completion of PT discharge. Caregiver involvement: Pt daughter is primary caregiver at this time and has been assisting with medical appointments and some ADL support  Medications reconciled and all medications are available in the home this visit. The following education was provided regarding medications, medication interactions, and look a like medications: NA.  Medications  are effective at this time. Home health supplies by type and quantity ordered/delivered this visit include: NA  Patient education provided this visit: safety with functional mobility  Current Functional Status and progress towards goals:  Strength: Pt. BLE strength is 4/5 which is an improvement from the initial evaluation strength of 3/5. This allows the patient increased functional independence and mobility  BED MOBILITY: Pt. is independent with all bed mobility  which demonstrates an improvement from the initial evaluation of min A. This allows for the patient to be functionally more independent. TRANSFERS: Pt. is independent with all transfers which demonstrates and improvement from the initial evaluation score of min A. This allows the patient increased functional independence and mobility  GAIT/WC MOBILITY: Pt. is able to ambulate >1000 feet outside over even and uneven surfaces with independence A with no AD. This represents an improvement from the initial evaluation of 25 feet with FWW AD on level surfaces with min A. STAIRS: 12+ stairs independently  BALANCE: Patient's final tinetti is 28/28 which is an improvement from the initial evaluation score of 15/28. This allows the patient to be functionally more independent and have a decreased fall risk  Progress toward goals: Patient has met all goals, see interventions for details. Pt. was able to return demonstrate all mobility training and HEP shown independently.    Patient response to treatment and education: Patient tolerated treatment well, no complaint of new pain noted post treatment. Home exercise program: Patient has been given a HEP and patient/caregiver understand the HEP.  Patient is doing the HEP 1-2/day as able  Continued need for the following skills:  NA patient is final DC from PT today   The following discharge planning was discussed with the pt/caregiver: DC from agency

## 2022-08-15 NOTE — HOME HEALTH
Caregiver: relative. Caregiver assists with COOKING, CLEANING AND TAKES PATIENT TO MD APPT. Medications reconciled and all medications are available in the home this visit. The following education was provided regarding medications, medication interactions, and look alike medications (specify): RECONCILED MEDICATIONS WITH PATIENT AND CAREGIVER. Medications  are effective at this time. High risk medication teaching regarding anticoagulants, hyperglycemic agents or opiod narcotics performed (specify) [address] [facility] EFFECTS   notified of any discrepancies/medication interactions NA    Home health supplies by type and quantity ordered/delivered this visit include: NA    Patient education provided this visit  to include: TAUGHT PATIENT S/S OF INFECTION AT SURGICAL SITE. INSTRUCTED ON MEDICATION AND DIETARY COMPLIANCY. INSTRUCTED ON WHEN TO NOTIFY MD OF MEDICAL CHANGES R/T CABG. INSTRUCTED ON S/S OF SOB. SN DISCHARGING PATIENT THIS VISIT, PHYSICAL THERAPY TO DC NEXT WEEK . SURGICAL SITES DRY AND INTACT. STERI STRIPS INTACT ON LT OUTER LEG. Patient/caregiver degree of understanding:good    Home exercise program/Homework provided: BREATHING EXERCISES TO HELP PREVENT PNEUMONIA. Lucila Pemberton Pt/Caregiver instructed on plan of care and are agreeable to plan of care at this time. Discharge planning discussed with patient and caregiver. Discharge planning as follows: PATIENT WILL BE DISCHARGED  THIS VISIT FROM NURSING. Pt/Caregiver did verbalize understanding of discharge planning.

## 2022-08-24 ENCOUNTER — HOSPITAL ENCOUNTER (OUTPATIENT)
Dept: MAMMOGRAPHY | Age: 62
Discharge: HOME | End: 2022-08-24
Attending: NURSE PRACTITIONER
Payer: COMMERCIAL

## 2022-08-24 ENCOUNTER — HOSPITAL ENCOUNTER (OUTPATIENT)
Dept: ULTRASOUND IMAGING | Age: 62
Discharge: HOME | End: 2022-08-24
Attending: NURSE PRACTITIONER
Payer: COMMERCIAL

## 2022-08-24 DIAGNOSIS — Z12.31 ENCOUNTER FOR SCREENING MAMMOGRAM FOR MALIGNANT NEOPLASM OF BREAST: ICD-10-CM

## 2022-08-24 DIAGNOSIS — N63.20 BREAST MASS, LEFT: ICD-10-CM

## 2022-08-24 PROCEDURE — 76642 ULTRASOUND BREAST LIMITED: CPT

## 2022-08-24 PROCEDURE — 77062 BREAST TOMOSYNTHESIS BI: CPT

## 2022-08-29 ENCOUNTER — OFFICE VISIT (OUTPATIENT)
Dept: CARDIOTHORACIC SURGERY | Age: 62
End: 2022-08-29
Payer: MEDICAID

## 2022-08-29 VITALS
SYSTOLIC BLOOD PRESSURE: 115 MMHG | DIASTOLIC BLOOD PRESSURE: 68 MMHG | HEART RATE: 72 BPM | OXYGEN SATURATION: 99 % | WEIGHT: 115 LBS | TEMPERATURE: 97.2 F | BODY MASS INDEX: 21.16 KG/M2 | HEIGHT: 62 IN

## 2022-08-29 DIAGNOSIS — Z95.1 S/P CABG X 3: Primary | ICD-10-CM

## 2022-08-29 PROCEDURE — 99024 POSTOP FOLLOW-UP VISIT: CPT | Performed by: PHYSICIAN ASSISTANT

## 2022-08-29 RX ORDER — METOPROLOL TARTRATE 25 MG/1
12.5 TABLET, FILM COATED ORAL 2 TIMES DAILY
Qty: 30 TABLET | Refills: 2 | Status: SHIPPED | OUTPATIENT
Start: 2022-08-29 | End: 2022-09-30 | Stop reason: SDUPTHER

## 2022-08-29 NOTE — LETTER
NOTIFICATION RETURN TO WORK / SCHOOL    8/29/2022 11:19 AM    Ms. Michael Paredes  [address] [facility] [address]      To Whom It May Concern:    Michael Paredes is currently under the care of [address]. She will return to work on 9/19/22. If there are questions or concerns please have the patient contact our office.         Sincerely,          Анна Andino PA-C

## 2022-08-29 NOTE — PROGRESS NOTES
Progress Note    Patient: Spencer Baker MRN: [id number]  SSN: [id number]    YOB: 1960  Age: 57 y.o. Sex: female    Follow-up S/P CABG  Tightness right lower leg no pain    Vitals:    08/29/22 1106   BP: 115/68   Pulse: 72   Temp: 97.2 °F (36.2 °C)   TempSrc: Temporal   SpO2: 99%   Weight: 52.2 kg (115 lb)   Height: 5' 2\" (1.575 m)        Intake and Output:. .  Signed By: Trish Hawk LPN     August 29, 1496

## 2022-08-29 NOTE — PATIENT INSTRUCTIONS
You may stop wearing the white sternal harness. Continue all of your medications as scheduled. You are cleared to start Cardiac Rehab, which we recommend for our patients who have heart vessel bypass surgery. We will make a referral and they will be calling you to discuss the program.    Please continue to abstain from smoking. You may begin regular exercise, working up over time to 30 minutes of vigorous each day. Please adhere to low-cholesterol, low-fat diet. If you need more information of what and what not to eat, please call our office and we will provide this for you. You are cleared to drive as of today. You may resume all physical activities as your stamina allows, but be careful for the next few weeks in lifting any heavy objects greater than 5 - 10 pounds. Your chest muscles may be weaker than before surgery and will take some time to build back up to their normal strength. Until then, lift slowly and with caution. Please follow-up with your Cardiologist and Primary Care Physician as scheduled. Although you have no regularly scheduled appointments to see us in the future, please feel free to call our office with any questions or concerns.

## 2022-08-29 NOTE — PROGRESS NOTES
CARDIAC SURGERY FOLLOW-UP NOTE    8/29/2022    Subjective:   Sameera Johnson returns for a follow-up visit following CABG x 3 on 7/16/22 by Dr. Sid Estrada. Assessment:  Overall she is doing excellent following open-heart surgery. Plans / Recommendations:   Start low dose BB - metoprolol 12.5mg. asked pt to obtain BP cuff reviewed s/s of hypotension. Has cardiology follow up in Nov.   Return to work note provided. Follow-up with Primary Care Provider and Cardiologist in the future as directed. Has no regularly scheduled appointment to see us, but may call with any questions or concerns. Recommend entrance into cardiac rehabilitation program.  Referral made. Adhere to low-cholesterol, low-fat diet. Begin regular exercise, preferably 30 minutes daily. Smoking abstention. Patient verbalizes understanding and agreement with the plan. Monie Barroso PA-C  [facility]  [phone]  _______________________________________________________________________________________________________________________________________________________  Subjective:  She feels well. Angina is present. Shortness of breath is present. Sternal pain is absent  Stamina  improving, able to perform many  daily activities without restriction. Eating well. Bowel movements regular. Smoking status: quit    Current medications include:  Current Outpatient Medications   Medication Sig Dispense Refill    metoprolol tartrate (LOPRESSOR) 25 mg tablet Take 0.5 Tablets by mouth two (2) times a day. 30 Tablet 2    aspirin delayed-release 81 mg tablet Take 1 Tablet by mouth in the morning. 30 Tablet 2    atorvastatin (LIPITOR) 40 mg tablet Take 1 Tablet by mouth nightly. 30 Tablet 2    clopidogreL (PLAVIX) 75 mg tab Take 1 Tablet by mouth in the morning.  30 Tablet 2       Objective:   Vital signs:    BP Readings from Last 3 Encounters:   08/29/22 115/68   08/15/22 116/68   08/15/22 94/64     Wt Readings from Last 3 Encounters:   08/29/22 52.2 kg (115 lb)   08/15/22 49.4 kg (109 lb)   08/02/22 52.6 kg (116 lb)     @TMAX(24)@  Wt Readings from Last 3 Encounters:   08/29/22 52.2 kg (115 lb)   08/15/22 49.4 kg (109 lb)   08/02/22 52.6 kg (116 lb)     Ht Readings from Last 3 Encounters:   08/29/22 5' 2\" (1.575 m)   08/15/22 5' 2\" (1.575 m)   08/02/22 5' 2\" (1.575 m)     Respiratory exam: clear to auscultation bilaterally. Cardiac exam: regular rate and rhythm   Sternum: stable. Sternal wound: clean, dry, healing. Leg wounds: clean, dry, healing. Pedal edema: absent, unchanged.

## 2022-09-07 ENCOUNTER — HOSPITAL ENCOUNTER (OUTPATIENT)
Dept: CARDIAC REHAB | Age: 62
Discharge: HOME | End: 2022-09-07
Payer: COMMERCIAL

## 2022-09-07 VITALS — BODY MASS INDEX: 21.22 KG/M2 | WEIGHT: 116 LBS

## 2022-09-07 PROCEDURE — 93798 PHYS/QHP OP CAR RHAB W/ECG: CPT

## 2022-09-07 NOTE — PROGRESS NOTES
CARDIAC REHAB INITIAL ITP FOR REVIEW AND SIGNATURE    Pradeep Ramirez 57 y.o. presented to cardiac rehab for an intake and a six minute walk test today with a primary diagnosis of CABG. Patient's EF is 60%. Pradeep Ramirez has a history of Hyperlipidemia, Coronary artery disease, and history of prior MI. Cardiac risk factors include smoking/ tobacco exposure, dyslipidemia and these were reviewed with patient. Pradeep Ramirez is not  and lives with lives alone. PHQ-9, depression score, is 0 and this is considered to be low. The result was discussed with patient who confirms score to be accurate and if above a 5, a copy of the results were sent to patient's PCP. Patient denied chest pain or SOB during 6 minute walk and the cardiac rhythm was in Normal Sinus Rhythm with peak HR of 73. Pradeep Ramirez will attend exercise and educational sessions 3 days a week in cardiac rehab for 36 sessions. Exceptions noted during intake include pt recently quit smoking. Goals for Rehab:    Patient name: Pradeep Ramirez : 1960       Goals Comments   1. Improve overall cardiac health and fitness by end of program   [x] initial  [] met                  [] not met  [] progressing Pt will be able to complete 3 machines x15 minutes for a total of 45 minutes of exercise without c/o fatigue or shortness of breath (SOB). 2.  Continue with Smoking Cessation and remain smoke-free by end of program    [x] initial  [] met                  [] not met  [] progressing Pt will verbalize remaining smoke-free during program and will utilize support resources when needed. 3. Maintain target heart rate (THR) during exercise by next recert period    [x] initial  [] met                  [] not met  [] progressing Pt will maintain a THR of  during exercise to maximize cardiac benefits.     4. Incorporate Heart Healthy Lifestyle by end of program    [x] initial  [] met                  [] not met  [] progressing Pt will verbalize making healthy lifestyle changes that include but not limited to eating a heart healthy diet which includes whole foods such as fresh fruits and vegetables, incorporating cardiovascular exercise at home and continued smoking cessation. Jose Romero RN 9/7/2022 4:19 PM       Cardiac ITP 09/07/22 1302   Treatment Diagnosis   Treatment Diagnosis 1 CABG   CABG Date 07/22/22   Referral Date 08/29/22   Significant Cardiovascular History Previous myocardial infarction  (Nstemi, CAD, HLD)   Co-morbidities   (Hyperlipidemia)   Oxygen Saturation / Titration    Stages of change  Preparation   OXYGEN INTERVENTION   Oxygen Use No   Individual Treatment Plan   ITP Visit Type Initial Assessment   1st Date of Exercise  09/07/22   ITP Next Review Date 10/04/22   Visit #/Total Visits 1/36   EF % 55 %   Risk Stratification High   ITP Exercise;Psychosocial;Tobacco;Nutrition;Education   Exercise    Stages of Change Preparation   Test Six minute walk test   Assisted Devices None   Data Measured Before Walk   Heart Rate 54   Blood Pressure 114/63   O2 Saturation 97   O2 Device Room air   RPD 0   RPE 0   Data Measured during Walk   Indicate Mode of RPE 6 minutes   RPE Data Measured During   Treadmill Speed 1.9 mph   Treadmill Grade 0 %   Any problems while exercising none   Do you have shortness of breath No   Peak RPE 12   Peak RPD 0   Data Measured Immediately After Walk   Distance Walked in  ft   [employer] (miles) 0.16   Distance Walked in Feet (calculated) 844.8 ft   Heart Rate 61   Blood Pressure 139/90   RPE 0   O2 Saturation 98   Data Measured at 5 Minutes After Walk   Heart Rate 54   Blood Pressure 136/62   SpO2 98 %   Exercise Prescription   Mode Treadmill;Bike;Stepper; Rower;Elliptical;Other (comment)   Frequency per week 2-3   Duration per session 31-55   Intensity  METS       2.2 - 4.2   RPE 11-13   Progression Initial   Target Heart Rate    Resistance Training Yes   Exercise Blood Pressures Resting /63   Peak /90   Is BP WDL? No  (Diastolic =09)   Exercise Activity at Home   Type   (walk, weights,)   Frequency 7   Duration 30   Resistance Training Yes   Exercise Education   Education Physically active; Warm up/Cool down;Equipment orientation;RPE scale;Signs/Symptoms to report; Exercise safety;Self pulse   Exercise Target Goal   Target Goal(s) Individual exercise RX;BP < 140/90 or < 130/80, if DM or CKD; Aerobic activity 30 + minutes/day  5 days/week   Patient Stated Exercise Goals   (increase endurance, healthier lifestyle)   Psychosocial   Stages of Change Preparation   Psychosocial Intervention   Interventions No intervention indicated   Currently Taking Psychotropic Meds No   Medication Changes No  (Initial)   Psychosocial Education   Education Advanced directives; Benefits of CPR completion;Cardiac meds; Environmental triggers; Coping techniques; Impact self care behaviors on health;Relaxation techniques;Signs/Symptoms of depression   Psychosocial Target Goals   Target Goal(s) Assess presence or absence of depression using a valid screening tool   Tobacco   Stages of Change Maintenance   Tobacco Use No   Quit Less than 6 month   Smokeless Tobacco Use No   Target Goal   Target Goal   (Continue cessation of tobacco products)   Nutrition   Stages of Change Preparation   Diabetes No   Lipids   Date Lipids Drawn 07/14/22   Total 127   HDL 43   LDL 73.8   Triglycerides 51   Lipid Med(s) Atorvastatin   Lipid Med Change(s) No  (Initial)   Weight Management   Waist Circumference  31\"   Alcohol None   Nutrition Assessment Tool Rate Your Plate   Weight  32.1 kg (116 lb)   Height  5' 2\" (1.575 m)   BMI 21.26   Nutrition Intervention   Dietitian Consult No   Nurse/Patient Discussion Yes   Nutrition Goals Heart healthy diet   Nutrition Education   Education Other (comment); Special diet; Healthy eating;Carb-controlled diet;DM & CAD relationship;Low fat & cholesterol diet   Nutrition Target Goals   Target Goals Waist size less than 40 inches males and less than 35 inches for females;BMI less than 25;Weight loss of 5-10%   Education   Learning Barrier Ready to learn   Education Intervention   Education Schedule Given Yes   Patient Education    Education CAD;Risk factors;Cardiac A&P; Med Compliance;Signs/Symptoms of Angina   Hypertension No   Is BP WDL? Yes   Med(s) Change No  (Initial)   BP Meds Lopressor; Plavix   ACE/ARB Prescribed No   ASA Prescribed Yes   BB Prescribed Yes   Statins Prescribed Yes   ASA Adherent Yes   BB Adherent Yes   Statin Intensity High   Statins Adherent Yes   Education Target Goals   Target Goals Understand target guidelines for B/P;Understand target guidelines for lipids; Risk factors; Medication compliance   Treatment Goals   Goals Exercise   Exercise Goal Healthier lifestyle   Exercise Goal Status Initial       Exercise Log  09/07/22 1302   Rehab Common Questions   Any problems changes since your last visit Other (comment)   Any symptoms while exercising denies   Psychosocial/Stress Level 0   Resting EKG rhythm SB   Tobacco Use Quit (add date in comments)   Enter O2 Saturation and Liter Flow 97   ITP Next Review Date 10/04/22   Visit Number/Total Visits 1/36   What is plan for next session exercsie   On Call Medical Director Immediately Available [facility]   Exercise Treatment Log   Target Heart Rate(Range)    Resting HR 54   Resting /63   Recovery HR 54   Recovery /62   Weight 52.6 kg (116 lb)   Exercise EKG Rhythm SR   Exercise Duration 6   Peak HR 73   Peak /90   Peak RPE 12   Peak Mets 2.2   SOB 0   Angina 0   Claudulation 0   Asymptomatic yes   O2 Saturation 99   Total Minutes 16

## 2022-09-12 ENCOUNTER — HOSPITAL ENCOUNTER (OUTPATIENT)
Dept: CARDIAC REHAB | Age: 62
Discharge: HOME | End: 2022-09-12
Payer: COMMERCIAL

## 2022-09-12 VITALS — BODY MASS INDEX: 21.58 KG/M2 | WEIGHT: 118 LBS

## 2022-09-12 PROCEDURE — 93798 PHYS/QHP OP CAR RHAB W/ECG: CPT

## 2022-09-14 ENCOUNTER — HOSPITAL ENCOUNTER (OUTPATIENT)
Dept: CARDIAC REHAB | Age: 62
Discharge: HOME | End: 2022-09-14
Payer: COMMERCIAL

## 2022-09-14 VITALS — WEIGHT: 118 LBS | BODY MASS INDEX: 21.58 KG/M2

## 2022-09-14 PROCEDURE — 93798 PHYS/QHP OP CAR RHAB W/ECG: CPT

## 2022-09-16 ENCOUNTER — HOSPITAL ENCOUNTER (OUTPATIENT)
Dept: CARDIAC REHAB | Age: 62
Discharge: HOME | End: 2022-09-16
Payer: COMMERCIAL

## 2022-09-16 VITALS — WEIGHT: 120 LBS | BODY MASS INDEX: 21.95 KG/M2

## 2022-09-16 PROCEDURE — 93798 PHYS/QHP OP CAR RHAB W/ECG: CPT

## 2022-09-19 ENCOUNTER — APPOINTMENT (OUTPATIENT)
Dept: CARDIAC REHAB | Age: 62
End: 2022-09-19
Payer: COMMERCIAL

## 2022-09-20 NOTE — PROGRESS NOTES
Non Routine Cardiac Rehab Discharge    [facility]  57 y.o. With diagnosis of CABG attended phase II cardiac rehab for 4 days. Pt verbalized she had to stop cardiac rehab [place]. Pt understands referal stays open for 1 year but still would have to do the intake process.      Donnie Jeffers RN  9/20/2022

## 2022-09-21 ENCOUNTER — APPOINTMENT (OUTPATIENT)
Dept: CARDIAC REHAB | Age: 62
End: 2022-09-21
Payer: COMMERCIAL

## 2022-09-23 ENCOUNTER — APPOINTMENT (OUTPATIENT)
Dept: CARDIAC REHAB | Age: 62
End: 2022-09-23
Payer: COMMERCIAL

## 2022-09-26 ENCOUNTER — APPOINTMENT (OUTPATIENT)
Dept: CARDIAC REHAB | Age: 62
End: 2022-09-26
Payer: COMMERCIAL

## 2022-09-28 ENCOUNTER — APPOINTMENT (OUTPATIENT)
Dept: CARDIAC REHAB | Age: 62
End: 2022-09-28
Payer: COMMERCIAL

## 2022-09-28 RX ORDER — METOPROLOL TARTRATE 25 MG/1
12.5 TABLET, FILM COATED ORAL 2 TIMES DAILY
Qty: 30 TABLET | Refills: 0 | OUTPATIENT
Start: 2022-09-28

## 2022-09-30 ENCOUNTER — APPOINTMENT (OUTPATIENT)
Dept: CARDIAC REHAB | Age: 62
End: 2022-09-30
Payer: COMMERCIAL

## 2022-09-30 NOTE — TELEPHONE ENCOUNTER
PCP: Dalton Christianson MD    Last appt: Visit date not found  Future Appointments   Date Time Provider Ese Shipman   11/7/2022  2:30 PM Diana Ramirez MD [facility] BS AMB       Requested Prescriptions     Pending Prescriptions Disp Refills    metoprolol tartrate (LOPRESSOR) 25 mg tablet 30 Tablet 2     Sig: Take 0.5 Tablets by mouth two (2) times a day.

## 2022-10-03 ENCOUNTER — APPOINTMENT (OUTPATIENT)
Dept: CARDIAC REHAB | Age: 62
End: 2022-10-03

## 2022-10-03 RX ORDER — METOPROLOL TARTRATE 25 MG/1
12.5 TABLET, FILM COATED ORAL 2 TIMES DAILY
Qty: 30 TABLET | Refills: 2 | Status: SHIPPED | OUTPATIENT
Start: 2022-10-03

## 2022-10-05 ENCOUNTER — APPOINTMENT (OUTPATIENT)
Dept: CARDIAC REHAB | Age: 62
End: 2022-10-05

## 2022-10-07 ENCOUNTER — APPOINTMENT (OUTPATIENT)
Dept: CARDIAC REHAB | Age: 62
End: 2022-10-07

## 2022-10-10 ENCOUNTER — APPOINTMENT (OUTPATIENT)
Dept: CARDIAC REHAB | Age: 62
End: 2022-10-10

## 2022-10-12 ENCOUNTER — APPOINTMENT (OUTPATIENT)
Dept: CARDIAC REHAB | Age: 62
End: 2022-10-12

## 2022-10-14 ENCOUNTER — APPOINTMENT (OUTPATIENT)
Dept: CARDIAC REHAB | Age: 62
End: 2022-10-14

## 2022-11-07 ENCOUNTER — OFFICE VISIT (OUTPATIENT)
Dept: CARDIOLOGY CLINIC | Age: 62
End: 2022-11-07
Payer: COMMERCIAL

## 2022-11-07 VITALS
DIASTOLIC BLOOD PRESSURE: 64 MMHG | SYSTOLIC BLOOD PRESSURE: 114 MMHG | HEIGHT: 62 IN | BODY MASS INDEX: 22.82 KG/M2 | HEART RATE: 54 BPM | WEIGHT: 124 LBS | OXYGEN SATURATION: 100 %

## 2022-11-07 DIAGNOSIS — E78.00 PURE HYPERCHOLESTEROLEMIA: Primary | ICD-10-CM

## 2022-11-07 DIAGNOSIS — I25.10 CORONARY ARTERY DISEASE DUE TO LIPID RICH PLAQUE: ICD-10-CM

## 2022-11-07 DIAGNOSIS — I25.83 CORONARY ARTERY DISEASE DUE TO LIPID RICH PLAQUE: ICD-10-CM

## 2022-11-07 DIAGNOSIS — I10 ESSENTIAL HYPERTENSION WITH GOAL BLOOD PRESSURE LESS THAN 140/90: ICD-10-CM

## 2022-11-07 PROCEDURE — 3078F DIAST BP <80 MM HG: CPT | Performed by: INTERNAL MEDICINE

## 2022-11-07 PROCEDURE — 3074F SYST BP LT 130 MM HG: CPT | Performed by: INTERNAL MEDICINE

## 2022-11-07 PROCEDURE — 99214 OFFICE O/P EST MOD 30 MIN: CPT | Performed by: INTERNAL MEDICINE

## 2022-11-07 RX ORDER — CLOPIDOGREL BISULFATE 75 MG/1
75 TABLET ORAL DAILY
Qty: 90 TABLET | Refills: 3 | Status: SHIPPED | OUTPATIENT
Start: 2022-11-07

## 2022-11-07 RX ORDER — NITROGLYCERIN 0.4 MG/1
0.4 TABLET SUBLINGUAL
Qty: 25 TABLET | Refills: 3 | Status: SHIPPED | OUTPATIENT
Start: 2022-11-07

## 2022-11-07 NOTE — PROGRESS NOTES
[facility] presents today for   Chief Complaint   Patient presents with    Follow-up     3 month follow up      Chest Pain     Occasional chest discomfort    Leg Swelling     Bilateral lower extremities and sometimes tingling sensation in legs          [facility] preferred language for health care discussion is english/other. Is someone accompanying this pt? no    Is the patient using any DME equipment during [address]? no    Depression Screening:  3 most recent PHQ Screens 11/7/2022   Little interest or pleasure in doing things Not at all   Feeling down, depressed, irritable, or hopeless Not at all   Total Score PHQ 2 0   Trouble falling or staying asleep, or sleeping too much -   Feeling tired or having little energy -   Poor appetite, weight loss, or overeating -   Feeling bad about yourself - or that you are a failure or have let yourself or your family down -   Trouble concentrating on things such as school, work, reading, or watching TV -   Moving or speaking so slowly that other people could have noticed; or the opposite being so fidgety that others notice -   Thoughts of being better off dead, or hurting yourself in some way -   PHQ 9 Score -       Learning Assessment:  Learning Assessment 8/15/2022   PRIMARY LEARNER Patient   PRIMARY LANGUAGE ENGLISH   LEARNER PREFERENCE PRIMARY DEMONSTRATION   ANSWERED BY patient   RELATIONSHIP SELF       Abuse Screening:  Abuse Screening Questionnaire 11/7/2022   Do you ever feel afraid of your partner? N   Are you in a relationship with someone who physically or mentally threatens you? N   Is it safe for you to go home? Y       Fall Risk  No flowsheet data found. Pt currently taking Anticoagulant therapy? no    Coordination of Care:  1. Have you been to the ER, urgent care clinic since your last visit? Hospitalized since your last visit? no    2.  Have you seen or consulted any other health care providers outside of the [address] [place] [address] since your last visit? Include any pap smears or colon screening.  no

## 2022-11-07 NOTE — PROGRESS NOTES
[facility]    Raudel Quinonez is 26-year-old female with history of CAD status post CABG, hyperlipidemia and tobacco use disorder    Patient is here today for follow-up appointment for her CAD, hyperlipidemia  Patient was admitted to hospital in 07/2022 with unstable angina and non-STEMI. Cardiac catheterization was performed which showed severe three-vessel coronary disease. Patient eventually underwent CABG x3 with LIMA to LAD, reverse SVG to diagonal branch and OM branch in 07/2022    Patient is doing well without any cardiac symptoms. She denies any chest pain or chest tightness to concern of angina. Continues to work. No side effect from the medication. Past Medical History:   Diagnosis Date    Arthritis     CAD (coronary artery disease)     HLD (hyperlipidemia)     Tobacco abuse        Review of Systems:  Cardiac symptoms as noted above in HPI. All others negative. Denies fatigue, malaise, skin rash, joint pain, blurring vision, photophobia, neck pain, hemoptysis, chronic cough, nausea, vomiting, hematuria, burning micturition, BRBPR, chronic headaches. Current Outpatient Medications   Medication Sig    metoprolol tartrate (LOPRESSOR) 25 mg tablet Take 0.5 Tablets by mouth two (2) times a day. aspirin delayed-release 81 mg tablet Take 1 Tablet by mouth in the morning. atorvastatin (LIPITOR) 40 mg tablet Take 1 Tablet by mouth nightly. clopidogreL (PLAVIX) 75 mg tab Take 1 Tablet by mouth in the morning. No current facility-administered medications for this visit.        Past Surgical History:   Procedure Laterality Date    HX PARTIAL HYSTERECTOMY         Allergies and Sensitivities:  No Known Allergies    Family History:  Family History   Problem Relation Age of Onset    Heart Disease Maternal Grandmother     Heart Attack Daughter        Social History:  Social History     Tobacco Use    Smoking status: Former Packs/day: 0.50     Types: Cigarettes     Quit date: 2022     Years since quittin.3     Passive exposure: Past    Smokeless tobacco: Never   Vaping Use    Vaping Use: Never used   Substance Use Topics    Alcohol use: No    Drug use: No     She  reports that she quit smoking about 3 months ago. Her smoking use included cigarettes. She smoked an average of .5 packs per day. She has been exposed to tobacco smoke. She has never used smokeless tobacco.  She  reports no history of alcohol use. Physical Exam:  BP Readings from Last 3 Encounters:   22 114/64   22 115/68   08/15/22 116/68         Pulse Readings from Last 3 Encounters:   22 (!) 54   22 72   08/15/22 81          Wt Readings from Last 3 Encounters:   22 56.2 kg (124 lb)   22 54.4 kg (120 lb)   22 53.5 kg (118 lb)       Constitutional: Oriented to person, place, and time. HENT: Head: Normocephalic and atraumatic. Eyes: Conjunctivae and extraocular motions are normal.   Neck: No JVD present. Carotid bruit is not appreciated. Cardiovascular: Regular rhythm. No murmur, gallop or rubs appreciated  Lung: Breath sounds normal. No respiratory distress. No ronchi or rales appreciated  Abdominal: No tenderness. No rebound and no guarding. Musculoskeletal: There is no lower extremity edema.  No cynosis    LABS:   @  Lab Results   Component Value Date/Time    WBC 8.6 2022 06:56 AM    Hemoglobin, POC 8.3 (L) 2022 04:55 AM    HGB 8.9 (L) 2022 06:56 AM    Hematocrit, POC 24 (L) 2022 04:55 AM    HCT 28.0 (L) 2022 06:56 AM    PLATELET 765  06:56 AM    MCV 97.6 2022 06:56 AM     Lab Results   Component Value Date/Time    Sodium 137 2022 05:34 AM    Potassium 4.3 2022 05:34 AM    Chloride 104 2022 05:34 AM    CO2 27 2022 05:34 AM    Glucose 105 (H) 2022 05:34 AM    BUN 13 2022 05:34 AM    Creatinine 0.66 2022 05:34 AM     Lipids Latest Ref Rng & Units 7/14/2022 7/13/2022   Chol, Total <200 MG/ 138   HDL 40 - 60 MG/DL 43 44   LDL 0 - 100 MG/DL 73.8 83.6   Trig <150 MG/DL 51 52   Chol/HDL Ratio 0 - 5.0   3.0 3.1   Some recent data might be hidden     Lab Results   Component Value Date/Time    ALT (SGPT) 21 07/17/2022 04:44 AM     Lab Results   Component Value Date/Time    Hemoglobin A1c 5.9 (H) 07/13/2022 10:31 AM     Lab Results   Component Value Date/Time    TSH 0.68 07/13/2022 04:52 PM       07/13/22    ECHO ADULT COMPLETE 07/13/2022 7/13/2022    Left Ventricle: Normal left ventricular systolic function with a visually estimated EF of 55 - 60%. Left ventricle size is normal. Normal wall thickness. Normal wall motion. Normal diastolic function. Mitral Valve: Mildly thickened leaflet. Mild regurgitation. CATH (07/2022)  Left Main   The vessel is angiographically normal.   Left Anterior Descending   Angiography calcification noted. Mid LAD has a diffuse up to 70-75% long disease. Distal vessel appeared approximately 2 mm in size without any significant obstructive disease. Diagonal branch: 99% ostial disease, bifurcating vessel. Approximately 1.5-1.75 mm vessel. Left Circumflex   Ostial and proximal 90% stenosis of native LCx. High OM branch is 95-99% stenosis with faint forward flow. This vessel appears very small caliber OM 2 and OM 3: Small caliber vessel with luminal irregularities   Right Coronary Artery   Caliber vessel. Proximal and mid diffuse disease. Distally severe disease with percent occlusion. There is evidence of left to right collateral.     Left Ventricle The left ventricular systolic function is normal. LV end diastolic pressure is normal. LV EDP is: 12. The estimated EF = grossly normal. There is no evidence of mitral regurgitation. Aortic Valve There is no aortic valve stenosis.      IMPRESSION & PLAN:  Ms. Alma Smiley is 57-year-old female    CAD:  Non-STEMI in 07/2022, cardiac catheterization as mentioned above with three-vessel CAD  Status post CABG x3 with LIMA to LAD, reverse SVG to diagonal and OM in 07/2022  Currently on aspirin, metoprolol Plavix and statin. No angina. Would consider stopping Plavix a year after CABG  No angina but will provide sublingual nitroglycerin for as needed use    Hyperlipidemia: Currently on atorvastatin 40 mg daily. Recommend to continue same    Tobacco abuse disorder:  Patient just quit smoking after recent CABG. Patient used to smoke 20-pack-year before that    This plan was discussed with patient who is in agreement. Thank you for allowing me to participate in patient care. Please feel free to call me if you have any question or concern. Deisy Toro MD  Please note: This document has been produced using voice recognition software. Unrecognized errors in transcription may be present.

## 2022-12-03 RX ORDER — METOPROLOL TARTRATE 25 MG/1
TABLET, FILM COATED ORAL
Qty: 30 TABLET | Refills: 2 | OUTPATIENT
Start: 2022-12-03

## 2023-01-31 DIAGNOSIS — Z12.31 ENCOUNTER FOR SCREENING MAMMOGRAM FOR MALIGNANT NEOPLASM OF BREAST: Primary | ICD-10-CM

## 2023-02-03 DIAGNOSIS — Z12.31 ENCOUNTER FOR SCREENING MAMMOGRAM FOR MALIGNANT NEOPLASM OF BREAST: Primary | ICD-10-CM

## 2023-05-08 ENCOUNTER — OFFICE VISIT (OUTPATIENT)
Age: 63
End: 2023-05-08
Payer: MEDICARE

## 2023-05-08 VITALS
HEART RATE: 59 BPM | DIASTOLIC BLOOD PRESSURE: 78 MMHG | WEIGHT: 126 LBS | BODY MASS INDEX: 23.19 KG/M2 | HEIGHT: 62 IN | SYSTOLIC BLOOD PRESSURE: 138 MMHG | OXYGEN SATURATION: 100 %

## 2023-05-08 DIAGNOSIS — I25.83 CORONARY ATHEROSCLEROSIS DUE TO LIPID RICH PLAQUE (CODE): Primary | ICD-10-CM

## 2023-05-08 DIAGNOSIS — I10 ESSENTIAL (PRIMARY) HYPERTENSION: ICD-10-CM

## 2023-05-08 DIAGNOSIS — E78.00 PURE HYPERCHOLESTEROLEMIA, UNSPECIFIED: ICD-10-CM

## 2023-05-08 PROBLEM — Z95.1 S/P CABG X 3: Status: ACTIVE | Noted: 2022-07-16

## 2023-05-08 PROCEDURE — G8427 DOCREV CUR MEDS BY ELIG CLIN: HCPCS | Performed by: NURSE PRACTITIONER

## 2023-05-08 PROCEDURE — 3017F COLORECTAL CA SCREEN DOC REV: CPT | Performed by: NURSE PRACTITIONER

## 2023-05-08 PROCEDURE — 99213 OFFICE O/P EST LOW 20 MIN: CPT | Performed by: NURSE PRACTITIONER

## 2023-05-08 PROCEDURE — 3075F SYST BP GE 130 - 139MM HG: CPT | Performed by: NURSE PRACTITIONER

## 2023-05-08 PROCEDURE — G8420 CALC BMI NORM PARAMETERS: HCPCS | Performed by: NURSE PRACTITIONER

## 2023-05-08 PROCEDURE — 3078F DIAST BP <80 MM HG: CPT | Performed by: NURSE PRACTITIONER

## 2023-05-08 PROCEDURE — 1036F TOBACCO NON-USER: CPT | Performed by: NURSE PRACTITIONER

## 2023-05-08 ASSESSMENT — ENCOUNTER SYMPTOMS
DIARRHEA: 0
WHEEZING: 0
COUGH: 0
CHEST TIGHTNESS: 0
VOMITING: 0
SHORTNESS OF BREATH: 0
ABDOMINAL DISTENTION: 0
BLOOD IN STOOL: 0
CONSTIPATION: 0
NAUSEA: 0

## 2023-05-08 ASSESSMENT — ANXIETY QUESTIONNAIRES
1. FEELING NERVOUS, ANXIOUS, OR ON EDGE: 0
4. TROUBLE RELAXING: 0
3. WORRYING TOO MUCH ABOUT DIFFERENT THINGS: 0
5. BEING SO RESTLESS THAT IT IS HARD TO SIT STILL: 0
2. NOT BEING ABLE TO STOP OR CONTROL WORRYING: 0
GAD7 TOTAL SCORE: 0
6. BECOMING EASILY ANNOYED OR IRRITABLE: 0
7. FEELING AFRAID AS IF SOMETHING AWFUL MIGHT HAPPEN: 0

## 2023-05-08 ASSESSMENT — PATIENT HEALTH QUESTIONNAIRE - PHQ9
SUM OF ALL RESPONSES TO PHQ QUESTIONS 1-9: 0
2. FEELING DOWN, DEPRESSED OR HOPELESS: 0
SUM OF ALL RESPONSES TO PHQ9 QUESTIONS 1 & 2: 0
1. LITTLE INTEREST OR PLEASURE IN DOING THINGS: 0

## 2023-05-08 NOTE — PROGRESS NOTES
Moris Part presents today for   Chief Complaint   Patient presents with    Follow-up     6 month    Shortness of Breath       Moris Part preferred language for health care discussion is english/other. Is someone accompanying this pt? no    Is the patient using any DME equipment during OV? no    Depression Screening:  Depression: Not at risk    PHQ-2 Score: 0        Learning Assessment:  Who is the primary learner? Patient    What is the preferred language for health care of the primary learner? ENGLISH    How does the primary learner prefer to learn new concepts? DEMONSTRATION    Answered By patient    Relationship to Learner SELF           Pt currently taking Anticoagulant therapy? no    Pt currently taking Antiplatelet therapy ? Aspirin 81 mg daily and plavix 75 mg daily      Coordination of Care:  1. Have you been to the ER, urgent care clinic since your last visit? Hospitalized since your last visit? no    2. Have you seen or consulted any other health care providers outside of the [address] [place] [address] since your last visit? Include any pap smears or colon screening.  no

## 2023-08-07 ENCOUNTER — OFFICE VISIT (OUTPATIENT)
Age: 63
End: 2023-08-07
Payer: MEDICARE

## 2023-08-07 VITALS
HEART RATE: 66 BPM | DIASTOLIC BLOOD PRESSURE: 70 MMHG | OXYGEN SATURATION: 99 % | BODY MASS INDEX: 23.78 KG/M2 | SYSTOLIC BLOOD PRESSURE: 124 MMHG | WEIGHT: 130 LBS

## 2023-08-07 DIAGNOSIS — I25.10 CORONARY ARTERY DISEASE DUE TO LIPID RICH PLAQUE: Primary | ICD-10-CM

## 2023-08-07 DIAGNOSIS — I25.83 CORONARY ARTERY DISEASE DUE TO LIPID RICH PLAQUE: Primary | ICD-10-CM

## 2023-08-07 DIAGNOSIS — E78.00 PURE HYPERCHOLESTEROLEMIA: ICD-10-CM

## 2023-08-07 DIAGNOSIS — I10 ESSENTIAL HYPERTENSION WITH GOAL BLOOD PRESSURE LESS THAN 140/90: ICD-10-CM

## 2023-08-07 PROCEDURE — G8428 CUR MEDS NOT DOCUMENT: HCPCS | Performed by: INTERNAL MEDICINE

## 2023-08-07 PROCEDURE — G8400 PT W/DXA NO RESULTS DOC: HCPCS | Performed by: INTERNAL MEDICINE

## 2023-08-07 PROCEDURE — 1123F ACP DISCUSS/DSCN MKR DOCD: CPT | Performed by: INTERNAL MEDICINE

## 2023-08-07 PROCEDURE — 3017F COLORECTAL CA SCREEN DOC REV: CPT | Performed by: INTERNAL MEDICINE

## 2023-08-07 PROCEDURE — 1090F PRES/ABSN URINE INCON ASSESS: CPT | Performed by: INTERNAL MEDICINE

## 2023-08-07 PROCEDURE — 3074F SYST BP LT 130 MM HG: CPT | Performed by: INTERNAL MEDICINE

## 2023-08-07 PROCEDURE — 1036F TOBACCO NON-USER: CPT | Performed by: INTERNAL MEDICINE

## 2023-08-07 PROCEDURE — G8420 CALC BMI NORM PARAMETERS: HCPCS | Performed by: INTERNAL MEDICINE

## 2023-08-07 PROCEDURE — 3078F DIAST BP <80 MM HG: CPT | Performed by: INTERNAL MEDICINE

## 2023-08-07 PROCEDURE — 99214 OFFICE O/P EST MOD 30 MIN: CPT | Performed by: INTERNAL MEDICINE

## 2023-08-07 RX ORDER — TIZANIDINE 2 MG/1
TABLET ORAL
COMMUNITY
Start: 2023-07-26

## 2023-08-07 NOTE — PROGRESS NOTES
[facility]    Lawrence Sharpe is 70 y.o. female with history of CAD status post CABG, hyperlipidemia and tobacco use disorder    Patient is here today for follow-up appointment for her CAD, hyperlipidemia  Patient was admitted to hospital in 07/2022 with unstable  angina and non-STEMI. Cardiac catheterization was performed which showed severe three-vessel coronary disease. Patient eventually underwent CABG x3 with LIMA to LAD, reverse SVG to diagonal branch and OM branch in 07/2022    Denies any resting or exertional chest pain or chest pressure to suggest angina or any dyspnea to suggest heart failure. No presyncope or syncope  Denies any PND or LE edema. Taking all medications regularly. Past Medical History:   Diagnosis Date    Arthritis     CAD (coronary artery disease)     HLD (hyperlipidemia)     Tobacco abuse        Review of Systems:  Cardiac symptoms as noted above in HPI. All others negative. Current Outpatient Medications   Medication Sig    aspirin 81 MG EC tablet Take 1 tablet by mouth daily    atorvastatin (LIPITOR) 40 MG tablet Take 1 tablet by mouth daily    clopidogrel (PLAVIX) 75 MG tablet Take 1 tablet by mouth daily    metoprolol tartrate (LOPRESSOR) 25 MG tablet Take 0.5 tablets by mouth 2 times daily    nitroGLYCERIN (NITROSTAT) 0.4 MG SL tablet Place 1 tablet under the tongue every 5 minutes as needed     No current facility-administered medications for this visit.        Past Surgical History:   Procedure Laterality Date    PARTIAL HYSTERECTOMY (CERVIX NOT REMOVED)         Allergies and Sensitivities:  No Known Allergies    Family History:  Family History   Problem Relation Age of Onset    Heart Disease Maternal Grandmother     Heart Attack Daughter        Social History:  Social History     Tobacco Use    Smoking status: Former     Packs/day: 0.50     Types: Cigarettes     Quit date: 7/13/2022     Years since

## 2023-08-31 ENCOUNTER — APPOINTMENT (OUTPATIENT)
Facility: HOSPITAL | Age: 63
End: 2023-08-31
Payer: MEDICARE

## 2023-08-31 ENCOUNTER — HOSPITAL ENCOUNTER (INPATIENT)
Facility: HOSPITAL | Age: 63
LOS: 1 days | Discharge: HOME | End: 2023-09-02
Attending: EMERGENCY MEDICINE | Admitting: INTERNAL MEDICINE
Payer: MEDICARE

## 2023-08-31 DIAGNOSIS — Z95.1 S/P CABG X 3: ICD-10-CM

## 2023-08-31 DIAGNOSIS — R09.02 HYPOXIA: Primary | ICD-10-CM

## 2023-08-31 DIAGNOSIS — I21.4 NSTEMI (NON-ST ELEVATED MYOCARDIAL INFARCTION) (HCC): ICD-10-CM

## 2023-08-31 DIAGNOSIS — I50.9 ACUTE HEART FAILURE, UNSPECIFIED HEART FAILURE TYPE (HCC): ICD-10-CM

## 2023-08-31 LAB
ALBUMIN SERPL-MCNC: 4.1 G/DL (ref 3.4–5)
ALBUMIN/GLOB SERPL: 1 (ref 0.8–1.7)
ALP SERPL-CCNC: 55 U/L (ref 45–117)
ALT SERPL-CCNC: 31 U/L (ref 13–56)
ANION GAP SERPL CALC-SCNC: 8 MMOL/L (ref 3–18)
AST SERPL-CCNC: 27 U/L (ref 10–38)
BASOPHILS # BLD: 0 K/UL (ref 0–0.1)
BASOPHILS NFR BLD: 0 % (ref 0–2)
BILIRUB SERPL-MCNC: 1.3 MG/DL (ref 0.2–1)
BUN SERPL-MCNC: 11 MG/DL (ref 7–18)
BUN/CREAT SERPL: 12 (ref 12–20)
CALCIUM SERPL-MCNC: 9 MG/DL (ref 8.5–10.1)
CHLORIDE SERPL-SCNC: 106 MMOL/L (ref 100–111)
CO2 SERPL-SCNC: 23 MMOL/L (ref 21–32)
CREAT SERPL-MCNC: 0.94 MG/DL (ref 0.6–1.3)
D DIMER PPP FEU-MCNC: 1.94 UG/ML(FEU)
DIFFERENTIAL METHOD BLD: ABNORMAL
EOSINOPHIL # BLD: 0 K/UL (ref 0–0.4)
EOSINOPHIL NFR BLD: 0 % (ref 0–5)
ERYTHROCYTE [DISTWIDTH] IN BLOOD BY AUTOMATED COUNT: 13.8 % (ref 11.6–14.5)
FLUAV RNA SPEC QL NAA+PROBE: NOT DETECTED
FLUBV RNA SPEC QL NAA+PROBE: NOT DETECTED
GLOBULIN SER CALC-MCNC: 4.3 G/DL (ref 2–4)
GLUCOSE SERPL-MCNC: 216 MG/DL (ref 74–99)
HCT VFR BLD AUTO: 35.4 % (ref 35–45)
HGB BLD-MCNC: 11.4 G/DL (ref 12–16)
IMM GRANULOCYTES # BLD AUTO: 0 K/UL (ref 0–0.04)
IMM GRANULOCYTES NFR BLD AUTO: 0 % (ref 0–0.5)
LYMPHOCYTES # BLD: 0.4 K/UL (ref 0.9–3.6)
LYMPHOCYTES NFR BLD: 5 % (ref 21–52)
MAGNESIUM SERPL-MCNC: 2 MG/DL (ref 1.6–2.6)
MCH RBC QN AUTO: 30 PG (ref 24–34)
MCHC RBC AUTO-ENTMCNC: 32.2 G/DL (ref 31–37)
MCV RBC AUTO: 93.2 FL (ref 78–100)
MONOCYTES # BLD: 0.4 K/UL (ref 0.05–1.2)
MONOCYTES NFR BLD: 5 % (ref 3–10)
NEUTS SEG # BLD: 7.3 K/UL (ref 1.8–8)
NEUTS SEG NFR BLD: 90 % (ref 40–73)
NRBC # BLD: 0 K/UL (ref 0–0.01)
NRBC BLD-RTO: 0 PER 100 WBC
NT PRO BNP: 2498 PG/ML (ref 0–900)
PLATELET # BLD AUTO: 196 K/UL (ref 135–420)
PMV BLD AUTO: 11.9 FL (ref 9.2–11.8)
POTASSIUM SERPL-SCNC: 3.9 MMOL/L (ref 3.5–5.5)
PROCALCITONIN SERPL-MCNC: <0.05 NG/ML
PROT SERPL-MCNC: 8.4 G/DL (ref 6.4–8.2)
RBC # BLD AUTO: 3.8 M/UL (ref 4.2–5.3)
SARS-COV-2 RNA RESP QL NAA+PROBE: NOT DETECTED
SODIUM SERPL-SCNC: 137 MMOL/L (ref 136–145)
TROPONIN I SERPL HS-MCNC: 168 NG/L (ref 0–54)
WBC # BLD AUTO: 8.1 K/UL (ref 4.6–13.2)

## 2023-08-31 PROCEDURE — 87636 SARSCOV2 & INF A&B AMP PRB: CPT

## 2023-08-31 PROCEDURE — 99285 EMERGENCY DEPT VISIT HI MDM: CPT

## 2023-08-31 PROCEDURE — 6360000004 HC RX CONTRAST MEDICATION: Performed by: EMERGENCY MEDICINE

## 2023-08-31 PROCEDURE — 85025 COMPLETE CBC W/AUTO DIFF WBC: CPT

## 2023-08-31 PROCEDURE — 71275 CT ANGIOGRAPHY CHEST: CPT

## 2023-08-31 PROCEDURE — 6370000000 HC RX 637 (ALT 250 FOR IP)

## 2023-08-31 PROCEDURE — 93005 ELECTROCARDIOGRAM TRACING: CPT | Performed by: EMERGENCY MEDICINE

## 2023-08-31 PROCEDURE — 85379 FIBRIN DEGRADATION QUANT: CPT

## 2023-08-31 PROCEDURE — 83735 ASSAY OF MAGNESIUM: CPT

## 2023-08-31 PROCEDURE — 83880 ASSAY OF NATRIURETIC PEPTIDE: CPT

## 2023-08-31 PROCEDURE — 80053 COMPREHEN METABOLIC PANEL: CPT

## 2023-08-31 PROCEDURE — 71045 X-RAY EXAM CHEST 1 VIEW: CPT

## 2023-08-31 PROCEDURE — 84145 PROCALCITONIN (PCT): CPT

## 2023-08-31 PROCEDURE — 84484 ASSAY OF TROPONIN QUANT: CPT

## 2023-08-31 PROCEDURE — 96374 THER/PROPH/DIAG INJ IV PUSH: CPT

## 2023-08-31 PROCEDURE — 94762 N-INVAS EAR/PLS OXIMTRY CONT: CPT

## 2023-08-31 RX ORDER — IPRATROPIUM BROMIDE AND ALBUTEROL SULFATE 2.5; .5 MG/3ML; MG/3ML
1 SOLUTION RESPIRATORY (INHALATION)
Status: COMPLETED | OUTPATIENT
Start: 2023-08-31 | End: 2023-08-31

## 2023-08-31 RX ORDER — FUROSEMIDE 10 MG/ML
10 INJECTION INTRAMUSCULAR; INTRAVENOUS
Status: COMPLETED | OUTPATIENT
Start: 2023-09-01 | End: 2023-09-01

## 2023-08-31 RX ADMIN — IPRATROPIUM BROMIDE AND ALBUTEROL SULFATE 1 DOSE: 2.5; .5 SOLUTION RESPIRATORY (INHALATION) at 22:00

## 2023-08-31 RX ADMIN — IOPAMIDOL 80 ML: 755 INJECTION, SOLUTION INTRAVENOUS at 21:52

## 2023-08-31 ASSESSMENT — PAIN - FUNCTIONAL ASSESSMENT: PAIN_FUNCTIONAL_ASSESSMENT: NONE - DENIES PAIN

## 2023-08-31 ASSESSMENT — ENCOUNTER SYMPTOMS
WHEEZING: 0
ABDOMINAL DISTENTION: 0
NAUSEA: 0
ABDOMINAL PAIN: 0
COUGH: 1
DIARRHEA: 0
VOMITING: 0
SINUS PRESSURE: 0
BACK PAIN: 0
SHORTNESS OF BREATH: 1
CHEST TIGHTNESS: 1
CONSTIPATION: 0
SORE THROAT: 0
SINUS PAIN: 0

## 2023-08-31 ASSESSMENT — LIFESTYLE VARIABLES
HOW MANY STANDARD DRINKS CONTAINING ALCOHOL DO YOU HAVE ON A TYPICAL DAY: 1 OR 2
HOW OFTEN DO YOU HAVE A DRINK CONTAINING ALCOHOL: MONTHLY OR LESS

## 2023-08-31 NOTE — ED NOTES
Introduced self to patient.  Denies needs at this time, respirations even and unlabored, CHET Beckwith RN  08/31/23 [id number]

## 2023-08-31 NOTE — ED NOTES
12 lead ECG done. Blood drawn. Report given to [facility].      Fadia Arora RN  08/31/23 [id number]

## 2023-08-31 NOTE — ED TRIAGE NOTES
.  Chief Complaint   Patient presents with    Shortness of Breath     Patient came in via EMS from [facility] due to shortness of breath for 3 weeks which worsened today. Patient reported that she is having dry cough for a long time. Patient had her regular check-up with her doctor this morning and was prescribed antibiotic, steroids and cough medicine.     Past Medical History:   Diagnosis Date    Arthritis     CAD (coronary artery disease)     HLD (hyperlipidemia)     Tobacco abuse

## 2023-08-31 NOTE — ED PROVIDER NOTES
surgical history, family history and social history. Vital Signs-Reviewed the patient's vital signs. EKG: Sinus rhythm w/ PACs, Pos LA enlargement, Septal infarct age undetermined      ED Course: Progress Notes, Reevaluation, and Consults:    Provider Notes (Medical Decision Making):   MDM    68-year-old female with past medical history of coronary artery disease status post CABG presenting with shortness of breath that developed this evening after 3 weeks of cough. Patient was given DuoNebs in route and placed on oxygen of 4 L.     CBC showed no leukocytosis. Slightly anemic at 71.5 metabolic panel within normal limits. BNP elevated at 2500 and troponin 168. Suspect related to cardiac history rather than ACS as patient not having any chest pain and no findings on EKG. D.Dimer was elevated. CTA showed moderate bilateral pulmonary effusions. Trailed patient on room air which she could not tolerate; dropped to 88 to 90% sitting in bed with minimal activity. Suspect effusions new development of heart failure. Giving IV Lasix. As patient unable to be weaned off of oxygen,  admit for supportive care and further work-up. Patient was given the following medications:  Medications   ipratropium 0.5 mg-albuterol 2.5 mg (DUONEB) nebulizer solution 1 Dose (1 Dose Inhalation Given 8/31/23 2200)   iopamidol (ISOVUE-370) 76 % injection 100 mL (80 mLs IntraVENous Given 8/31/23 2152)   furosemide (LASIX) injection 10 mg (10 mg IntraVENous Given 9/1/23 0004)       CONSULTS: (Who and What was discussed)  None    Chronic Conditions: CAD    Social Determinants affecting Dx or Tx: None    Records Reviewed (source and summary of external notes): Nursing Notes, Old Medical Records, and Previous Laboratory Studies    Procedures    Critical Care Time:       Diagnosis     Clinical Impression:   1. Hypoxia        Disposition: Admit for SOB    No follow-up provider specified.      Disclaimer: Sections of this note are dictated using utilizing voice recognition software. Minor typographical errors may be present. If questions arise, please do not hesitate to contact me or call our department.          Oksana Mireles MD  Resident  09/01/23 6359

## 2023-09-01 ENCOUNTER — APPOINTMENT (OUTPATIENT)
Facility: HOSPITAL | Age: 63
End: 2023-09-01
Payer: MEDICARE

## 2023-09-01 PROBLEM — J90 PLEURAL EFFUSION: Status: ACTIVE | Noted: 2023-09-01

## 2023-09-01 PROBLEM — I50.9 ACUTE HEART FAILURE, UNSPECIFIED HEART FAILURE TYPE (HCC): Status: ACTIVE | Noted: 2023-09-01

## 2023-09-01 PROBLEM — R09.02 HYPOXIA: Status: ACTIVE | Noted: 2023-09-01

## 2023-09-01 PROBLEM — J96.01 ACUTE RESPIRATORY FAILURE WITH HYPOXIA (HCC): Status: ACTIVE | Noted: 2023-09-01

## 2023-09-01 LAB
AMPHET UR QL SCN: NEGATIVE
ANION GAP SERPL CALC-SCNC: 6 MMOL/L (ref 3–18)
APPEARANCE UR: CLEAR
APTT PPP: 26.1 SEC (ref 23–36.4)
APTT PPP: 28.8 SEC (ref 23–36.4)
BACTERIA URNS QL MICRO: NEGATIVE /HPF
BARBITURATES UR QL SCN: NEGATIVE
BENZODIAZ UR QL: NEGATIVE
BILIRUB UR QL: NEGATIVE
BUN SERPL-MCNC: 10 MG/DL (ref 7–18)
BUN/CREAT SERPL: 12 (ref 12–20)
CALCIUM SERPL-MCNC: 9 MG/DL (ref 8.5–10.1)
CANNABINOIDS UR QL SCN: NEGATIVE
CHLORIDE SERPL-SCNC: 105 MMOL/L (ref 100–111)
CO2 SERPL-SCNC: 27 MMOL/L (ref 21–32)
COCAINE UR QL SCN: NEGATIVE
COLOR UR: YELLOW
CREAT SERPL-MCNC: 0.86 MG/DL (ref 0.6–1.3)
ECHO AO ASC DIAM: 2.2 CM
ECHO AO ASCENDING AORTA INDEX: 1.38 CM/M2
ECHO AO ROOT DIAM: 2.4 CM
ECHO AO ROOT INDEX: 1.51 CM/M2
ECHO BSA: 1.61 M2
ECHO BSA: 1.61 M2
ECHO EST RA PRESSURE: 8 MMHG
ECHO LV FRACTIONAL SHORTENING: 26 % (ref 28–44)
ECHO LV INTERNAL DIMENSION DIASTOLE INDEX: 2.64 CM/M2
ECHO LV INTERNAL DIMENSION DIASTOLIC: 4.2 CM (ref 3.9–5.3)
ECHO LV INTERNAL DIMENSION SYSTOLIC INDEX: 1.95 CM/M2
ECHO LV INTERNAL DIMENSION SYSTOLIC: 3.1 CM
ECHO LV IVSD: 0.8 CM (ref 0.6–0.9)
ECHO LV MASS 2D: 93 G (ref 67–162)
ECHO LV MASS INDEX 2D: 58.5 G/M2 (ref 43–95)
ECHO LV POSTERIOR WALL DIASTOLIC: 0.7 CM (ref 0.6–0.9)
ECHO LV RELATIVE WALL THICKNESS RATIO: 0.33
ECHO LVOT AREA: 2.3 CM2
ECHO LVOT DIAM: 1.7 CM
ECHO RIGHT VENTRICULAR SYSTOLIC PRESSURE (RVSP): 43 MMHG
ECHO RV FREE WALL PEAK S': 8 CM/S
ECHO RV TAPSE: 1.3 CM (ref 1.7–?)
ECHO TV REGURGITANT MAX VELOCITY: 2.97 M/S
ECHO TV REGURGITANT PEAK GRADIENT: 35 MMHG
EKG ATRIAL RATE: 88 BPM
EKG DIAGNOSIS: NORMAL
EKG P AXIS: 71 DEGREES
EKG P-R INTERVAL: 156 MS
EKG Q-T INTERVAL: 396 MS
EKG QRS DURATION: 102 MS
EKG QTC CALCULATION (BAZETT): 479 MS
EKG R AXIS: -17 DEGREES
EKG T AXIS: 81 DEGREES
EKG VENTRICULAR RATE: 88 BPM
ERYTHROCYTE [DISTWIDTH] IN BLOOD BY AUTOMATED COUNT: 13.7 % (ref 11.6–14.5)
GLUCOSE SERPL-MCNC: 127 MG/DL (ref 74–99)
GLUCOSE UR STRIP.AUTO-MCNC: NEGATIVE MG/DL
HCT VFR BLD AUTO: 33.7 % (ref 35–45)
HGB BLD-MCNC: 10.8 G/DL (ref 12–16)
HGB UR QL STRIP: ABNORMAL
KETONES UR QL STRIP.AUTO: NEGATIVE MG/DL
LEUKOCYTE ESTERASE UR QL STRIP.AUTO: NEGATIVE
Lab: NORMAL
MCH RBC QN AUTO: 29.6 PG (ref 24–34)
MCHC RBC AUTO-ENTMCNC: 32 G/DL (ref 31–37)
MCV RBC AUTO: 92.3 FL (ref 78–100)
METHADONE UR QL: NEGATIVE
NITRITE UR QL STRIP.AUTO: NEGATIVE
NRBC # BLD: 0 K/UL (ref 0–0.01)
NRBC BLD-RTO: 0 PER 100 WBC
OPIATES UR QL: NEGATIVE
PCP UR QL: NEGATIVE
PH UR STRIP: 5.5 (ref 5–8)
PLATELET # BLD AUTO: 213 K/UL (ref 135–420)
PMV BLD AUTO: 11.3 FL (ref 9.2–11.8)
POTASSIUM SERPL-SCNC: 4.3 MMOL/L (ref 3.5–5.5)
PROT UR STRIP-MCNC: NEGATIVE MG/DL
RBC # BLD AUTO: 3.65 M/UL (ref 4.2–5.3)
RBC #/AREA URNS HPF: NORMAL /HPF (ref 0–5)
SODIUM SERPL-SCNC: 138 MMOL/L (ref 136–145)
SP GR UR REFRACTOMETRY: 1.01 (ref 1–1.03)
T4 FREE SERPL-MCNC: 1.5 NG/DL (ref 0.7–1.5)
TROPONIN I SERPL HS-MCNC: 2241 NG/L (ref 0–54)
TROPONIN I SERPL HS-MCNC: 2557 NG/L (ref 0–54)
TSH SERPL DL<=0.05 MIU/L-ACNC: 0.68 UIU/ML (ref 0.36–3.74)
UROBILINOGEN UR QL STRIP.AUTO: 1 EU/DL (ref 0.2–1)
WBC # BLD AUTO: 10.4 K/UL (ref 4.6–13.2)
WBC URNS QL MICRO: NORMAL /HPF (ref 0–4)

## 2023-09-01 PROCEDURE — C1894 INTRO/SHEATH, NON-LASER: HCPCS | Performed by: INTERNAL MEDICINE

## 2023-09-01 PROCEDURE — 6360000004 HC RX CONTRAST MEDICATION: Performed by: INTERNAL MEDICINE

## 2023-09-01 PROCEDURE — 2500000003 HC RX 250 WO HCPCS: Performed by: INTERNAL MEDICINE

## 2023-09-01 PROCEDURE — 99222 1ST HOSP IP/OBS MODERATE 55: CPT | Performed by: INTERNAL MEDICINE

## 2023-09-01 PROCEDURE — 6360000002 HC RX W HCPCS

## 2023-09-01 PROCEDURE — 81001 URINALYSIS AUTO W/SCOPE: CPT

## 2023-09-01 PROCEDURE — 99153 MOD SED SAME PHYS/QHP EA: CPT | Performed by: INTERNAL MEDICINE

## 2023-09-01 PROCEDURE — 80307 DRUG TEST PRSMV CHEM ANLYZR: CPT

## 2023-09-01 PROCEDURE — 80048 BASIC METABOLIC PNL TOTAL CA: CPT

## 2023-09-01 PROCEDURE — C1713 ANCHOR/SCREW BN/BN,TIS/BN: HCPCS | Performed by: INTERNAL MEDICINE

## 2023-09-01 PROCEDURE — 93010 ELECTROCARDIOGRAM REPORT: CPT | Performed by: INTERNAL MEDICINE

## 2023-09-01 PROCEDURE — 6360000002 HC RX W HCPCS: Performed by: INTERNAL MEDICINE

## 2023-09-01 PROCEDURE — 84439 ASSAY OF FREE THYROXINE: CPT

## 2023-09-01 PROCEDURE — 85027 COMPLETE CBC AUTOMATED: CPT

## 2023-09-01 PROCEDURE — 36415 COLL VENOUS BLD VENIPUNCTURE: CPT

## 2023-09-01 PROCEDURE — 6370000000 HC RX 637 (ALT 250 FOR IP): Performed by: INTERNAL MEDICINE

## 2023-09-01 PROCEDURE — B2131ZZ FLUOROSCOPY OF MULTIPLE CORONARY ARTERY BYPASS GRAFTS USING LOW OSMOLAR CONTRAST: ICD-10-PCS | Performed by: INTERNAL MEDICINE

## 2023-09-01 PROCEDURE — C1769 GUIDE WIRE: HCPCS | Performed by: INTERNAL MEDICINE

## 2023-09-01 PROCEDURE — 93459 L HRT ART/GRFT ANGIO: CPT | Performed by: INTERNAL MEDICINE

## 2023-09-01 PROCEDURE — C1760 CLOSURE DEV, VASC: HCPCS | Performed by: INTERNAL MEDICINE

## 2023-09-01 PROCEDURE — 1100000003 HC PRIVATE W/ TELEMETRY

## 2023-09-01 PROCEDURE — 93308 TTE F-UP OR LMTD: CPT

## 2023-09-01 PROCEDURE — 99152 MOD SED SAME PHYS/QHP 5/>YRS: CPT | Performed by: INTERNAL MEDICINE

## 2023-09-01 PROCEDURE — 84443 ASSAY THYROID STIM HORMONE: CPT

## 2023-09-01 PROCEDURE — B2181ZZ FLUOROSCOPY OF LEFT INTERNAL MAMMARY BYPASS GRAFT USING LOW OSMOLAR CONTRAST: ICD-10-PCS | Performed by: INTERNAL MEDICINE

## 2023-09-01 PROCEDURE — 2709999900 HC NON-CHARGEABLE SUPPLY: Performed by: INTERNAL MEDICINE

## 2023-09-01 PROCEDURE — 2580000003 HC RX 258: Performed by: INTERNAL MEDICINE

## 2023-09-01 PROCEDURE — 76000 FLUOROSCOPY <1 HR PHYS/QHP: CPT | Performed by: INTERNAL MEDICINE

## 2023-09-01 PROCEDURE — B2111ZZ FLUOROSCOPY OF MULTIPLE CORONARY ARTERIES USING LOW OSMOLAR CONTRAST: ICD-10-PCS | Performed by: INTERNAL MEDICINE

## 2023-09-01 PROCEDURE — 85730 THROMBOPLASTIN TIME PARTIAL: CPT

## 2023-09-01 PROCEDURE — 84484 ASSAY OF TROPONIN QUANT: CPT

## 2023-09-01 PROCEDURE — 94640 AIRWAY INHALATION TREATMENT: CPT

## 2023-09-01 PROCEDURE — 4A023N7 MEASUREMENT OF CARDIAC SAMPLING AND PRESSURE, LEFT HEART, PERCUTANEOUS APPROACH: ICD-10-PCS | Performed by: INTERNAL MEDICINE

## 2023-09-01 RX ORDER — POLYETHYLENE GLYCOL 3350 17 G/17G
17 POWDER, FOR SOLUTION ORAL DAILY PRN
Status: DISCONTINUED | OUTPATIENT
Start: 2023-09-01 | End: 2023-09-02 | Stop reason: HOSPADM

## 2023-09-01 RX ORDER — SODIUM CHLORIDE 0.9 % (FLUSH) 0.9 %
5-40 SYRINGE (ML) INJECTION PRN
Status: DISCONTINUED | OUTPATIENT
Start: 2023-09-01 | End: 2023-09-02 | Stop reason: HOSPADM

## 2023-09-01 RX ORDER — CEFAZOLIN SODIUM 1 G/3ML
INJECTION, POWDER, FOR SOLUTION INTRAMUSCULAR; INTRAVENOUS PRN
Status: DISCONTINUED | OUTPATIENT
Start: 2023-09-01 | End: 2023-09-01 | Stop reason: HOSPADM

## 2023-09-01 RX ORDER — ASPIRIN 81 MG/1
81 TABLET ORAL DAILY
Status: DISCONTINUED | OUTPATIENT
Start: 2023-09-01 | End: 2023-09-02 | Stop reason: HOSPADM

## 2023-09-01 RX ORDER — MIDAZOLAM HYDROCHLORIDE 1 MG/ML
INJECTION INTRAMUSCULAR; INTRAVENOUS PRN
Status: DISCONTINUED | OUTPATIENT
Start: 2023-09-01 | End: 2023-09-01 | Stop reason: HOSPADM

## 2023-09-01 RX ORDER — SODIUM CHLORIDE 9 MG/ML
INJECTION, SOLUTION INTRAVENOUS PRN
Status: DISCONTINUED | OUTPATIENT
Start: 2023-09-01 | End: 2023-09-02 | Stop reason: HOSPADM

## 2023-09-01 RX ORDER — FUROSEMIDE 10 MG/ML
40 INJECTION INTRAMUSCULAR; INTRAVENOUS 2 TIMES DAILY
Status: DISCONTINUED | OUTPATIENT
Start: 2023-09-01 | End: 2023-09-02

## 2023-09-01 RX ORDER — ATORVASTATIN CALCIUM 40 MG/1
80 TABLET, FILM COATED ORAL DAILY
Status: DISCONTINUED | OUTPATIENT
Start: 2023-09-02 | End: 2023-09-02 | Stop reason: HOSPADM

## 2023-09-01 RX ORDER — ENOXAPARIN SODIUM 100 MG/ML
40 INJECTION SUBCUTANEOUS DAILY
Status: DISCONTINUED | OUTPATIENT
Start: 2023-09-01 | End: 2023-09-01

## 2023-09-01 RX ORDER — ACETAMINOPHEN 325 MG/1
650 TABLET ORAL EVERY 6 HOURS PRN
Status: DISCONTINUED | OUTPATIENT
Start: 2023-09-01 | End: 2023-09-02 | Stop reason: HOSPADM

## 2023-09-01 RX ORDER — DOXYCYCLINE 100 MG/1
100 CAPSULE ORAL 2 TIMES DAILY
Status: ON HOLD | COMMUNITY
Start: 2023-08-31 | End: 2023-09-02 | Stop reason: HOSPADM

## 2023-09-01 RX ORDER — HEPARIN SODIUM 1000 [USP'U]/ML
60 INJECTION, SOLUTION INTRAVENOUS; SUBCUTANEOUS ONCE
Status: DISCONTINUED | OUTPATIENT
Start: 2023-09-01 | End: 2023-09-01

## 2023-09-01 RX ORDER — METOPROLOL SUCCINATE 25 MG/1
25 TABLET, EXTENDED RELEASE ORAL DAILY
COMMUNITY
Start: 2023-08-31

## 2023-09-01 RX ORDER — NITROGLYCERIN 20 MG/100ML
INJECTION INTRAVENOUS PRN
Status: DISCONTINUED | OUTPATIENT
Start: 2023-09-01 | End: 2023-09-01 | Stop reason: HOSPADM

## 2023-09-01 RX ORDER — ONDANSETRON 4 MG/1
4 TABLET, ORALLY DISINTEGRATING ORAL EVERY 8 HOURS PRN
Status: DISCONTINUED | OUTPATIENT
Start: 2023-09-01 | End: 2023-09-02 | Stop reason: HOSPADM

## 2023-09-01 RX ORDER — SODIUM CHLORIDE 0.9 % (FLUSH) 0.9 %
5-40 SYRINGE (ML) INJECTION EVERY 12 HOURS SCHEDULED
Status: DISCONTINUED | OUTPATIENT
Start: 2023-09-01 | End: 2023-09-02 | Stop reason: HOSPADM

## 2023-09-01 RX ORDER — ALBUTEROL SULFATE 90 UG/1
2 AEROSOL, METERED RESPIRATORY (INHALATION) EVERY 4 HOURS PRN
COMMUNITY
Start: 2023-08-31

## 2023-09-01 RX ORDER — ATORVASTATIN CALCIUM 40 MG/1
40 TABLET, FILM COATED ORAL DAILY
Status: DISCONTINUED | OUTPATIENT
Start: 2023-09-01 | End: 2023-09-01

## 2023-09-01 RX ORDER — IPRATROPIUM BROMIDE AND ALBUTEROL SULFATE 2.5; .5 MG/3ML; MG/3ML
1 SOLUTION RESPIRATORY (INHALATION)
Status: DISCONTINUED | OUTPATIENT
Start: 2023-09-01 | End: 2023-09-02 | Stop reason: HOSPADM

## 2023-09-01 RX ORDER — CLOPIDOGREL BISULFATE 75 MG/1
75 TABLET ORAL DAILY
Status: DISCONTINUED | OUTPATIENT
Start: 2023-09-01 | End: 2023-09-02 | Stop reason: HOSPADM

## 2023-09-01 RX ORDER — DULOXETIN HYDROCHLORIDE 30 MG/1
30 CAPSULE, DELAYED RELEASE ORAL DAILY
COMMUNITY
Start: 2023-06-12

## 2023-09-01 RX ORDER — FAMOTIDINE 20 MG/1
20 TABLET, FILM COATED ORAL DAILY
Status: DISCONTINUED | OUTPATIENT
Start: 2023-09-01 | End: 2023-09-02 | Stop reason: HOSPADM

## 2023-09-01 RX ORDER — METOPROLOL SUCCINATE 25 MG/1
25 TABLET, EXTENDED RELEASE ORAL DAILY
Status: DISCONTINUED | OUTPATIENT
Start: 2023-09-01 | End: 2023-09-02 | Stop reason: HOSPADM

## 2023-09-01 RX ORDER — HEPARIN SODIUM 1000 [USP'U]/ML
60 INJECTION, SOLUTION INTRAVENOUS; SUBCUTANEOUS PRN
Status: DISCONTINUED | OUTPATIENT
Start: 2023-09-01 | End: 2023-09-01

## 2023-09-01 RX ORDER — ONDANSETRON 2 MG/ML
4 INJECTION INTRAMUSCULAR; INTRAVENOUS EVERY 6 HOURS PRN
Status: DISCONTINUED | OUTPATIENT
Start: 2023-09-01 | End: 2023-09-02 | Stop reason: HOSPADM

## 2023-09-01 RX ORDER — ACETAMINOPHEN 650 MG/1
650 SUPPOSITORY RECTAL EVERY 6 HOURS PRN
Status: DISCONTINUED | OUTPATIENT
Start: 2023-09-01 | End: 2023-09-02 | Stop reason: HOSPADM

## 2023-09-01 RX ORDER — HEPARIN SODIUM 1000 [USP'U]/ML
30 INJECTION, SOLUTION INTRAVENOUS; SUBCUTANEOUS PRN
Status: DISCONTINUED | OUTPATIENT
Start: 2023-09-01 | End: 2023-09-01

## 2023-09-01 RX ORDER — PREDNISONE 20 MG/1
20 TABLET ORAL DAILY
Status: ON HOLD | COMMUNITY
Start: 2023-08-31 | End: 2023-09-02 | Stop reason: HOSPADM

## 2023-09-01 RX ORDER — HEPARIN SODIUM 10000 [USP'U]/100ML
5-30 INJECTION, SOLUTION INTRAVENOUS CONTINUOUS
Status: DISCONTINUED | OUTPATIENT
Start: 2023-09-01 | End: 2023-09-01

## 2023-09-01 RX ORDER — CLOPIDOGREL BISULFATE 75 MG/1
150 TABLET ORAL
Status: COMPLETED | OUTPATIENT
Start: 2023-09-01 | End: 2023-09-01

## 2023-09-01 RX ORDER — FENTANYL CITRATE 50 UG/ML
INJECTION, SOLUTION INTRAMUSCULAR; INTRAVENOUS PRN
Status: DISCONTINUED | OUTPATIENT
Start: 2023-09-01 | End: 2023-09-01 | Stop reason: HOSPADM

## 2023-09-01 RX ADMIN — IPRATROPIUM BROMIDE AND ALBUTEROL SULFATE 1 DOSE: .5; 3 SOLUTION RESPIRATORY (INHALATION) at 08:34

## 2023-09-01 RX ADMIN — IPRATROPIUM BROMIDE AND ALBUTEROL SULFATE 1 DOSE: .5; 3 SOLUTION RESPIRATORY (INHALATION) at 20:21

## 2023-09-01 RX ADMIN — CLOPIDOGREL BISULFATE 75 MG: 75 TABLET ORAL at 08:35

## 2023-09-01 RX ADMIN — CLOPIDOGREL BISULFATE 150 MG: 75 TABLET ORAL at 15:37

## 2023-09-01 RX ADMIN — ASPIRIN 81 MG: 81 TABLET, COATED ORAL at 08:35

## 2023-09-01 RX ADMIN — ATORVASTATIN CALCIUM 40 MG: 40 TABLET, FILM COATED ORAL at 08:35

## 2023-09-01 RX ADMIN — FUROSEMIDE 10 MG: 10 INJECTION, SOLUTION INTRAMUSCULAR; INTRAVENOUS at 00:04

## 2023-09-01 RX ADMIN — METOPROLOL SUCCINATE 25 MG: 25 TABLET, EXTENDED RELEASE ORAL at 08:35

## 2023-09-01 RX ADMIN — FUROSEMIDE 40 MG: 10 INJECTION, SOLUTION INTRAMUSCULAR; INTRAVENOUS at 18:22

## 2023-09-01 RX ADMIN — FUROSEMIDE 40 MG: 10 INJECTION, SOLUTION INTRAMUSCULAR; INTRAVENOUS at 08:35

## 2023-09-01 RX ADMIN — SODIUM CHLORIDE, PRESERVATIVE FREE 5 ML: 5 INJECTION INTRAVENOUS at 21:14

## 2023-09-01 RX ADMIN — HEPARIN SODIUM 3540 UNITS: 1000 INJECTION INTRAVENOUS; SUBCUTANEOUS at 05:42

## 2023-09-01 RX ADMIN — FUROSEMIDE 40 MG: 10 INJECTION, SOLUTION INTRAMUSCULAR; INTRAVENOUS at 01:18

## 2023-09-01 RX ADMIN — HEPARIN SODIUM 12 UNITS/KG/HR: 10000 INJECTION, SOLUTION INTRAVENOUS at 05:46

## 2023-09-01 RX ADMIN — FAMOTIDINE 20 MG: 20 TABLET ORAL at 08:34

## 2023-09-01 ASSESSMENT — PAIN - FUNCTIONAL ASSESSMENT: PAIN_FUNCTIONAL_ASSESSMENT: NONE - DENIES PAIN

## 2023-09-01 NOTE — PROGRESS NOTES
TRANSFER - OUT REPORT:    Verbal report given to 1 macario Birmingham  being transferred to [place], [place] for routine progression of patient care       Report consisted of patient's Situation, Background, Assessment and   Recommendations(SBAR). Information from the following report(s) Nurse Handoff Report was reviewed with the receiving nurse. Lines:   Peripheral IV 08/31/23 Left Antecubital (Active)   Site Assessment Clean, dry & intact 09/01/23 1239   Line Status Blood return noted; Flushed 09/01/23 1239   Phlebitis Assessment No symptoms 09/01/23 1239   Infiltration Assessment 0 09/01/23 1239   Dressing Status Clean, dry & intact 09/01/23 1239   Dressing Type Transparent 09/01/23 1239       Peripheral IV 09/01/23 Right Antecubital (Active)   Site Assessment Clean, dry & intact 09/01/23 1239   Line Status Blood return noted; Flushed 09/01/23 1239   Phlebitis Assessment No symptoms 09/01/23 1239   Infiltration Assessment 0 09/01/23 1239   Dressing Status Clean, dry & intact 09/01/23 1239   Dressing Type Transparent 09/01/23 1239        Opportunity for questions and clarification was provided  . DEDE Melton RN    Patient transported with:  Tech: Transporter

## 2023-09-01 NOTE — ACP (ADVANCE CARE PLANNING)
Advance Care Planning     Advance Care Planning Inpatient Note  Spiritual Care Department    Today's Date: 9/1/2023  Unit: [facility] 2S TELEMETRY    Received request from admission screening. Upon review of chart and communication with care team, patient's decision making abilities are not in question. . Patient and Child/Children was/were present in the room during visit. Goals of ACP Conversation:  Discuss advance care planning documents    Health Care Decision Makers:     No healthcare decision makers have been documented. Click here to complete [address] including selection of the Healthcare Decision Maker Relationship (ie \"Primary\")  Summary:  Documented Next of Kin, per patient report  Verbally, Rosa Mascorro (daughter) was named as patient's nearest of kin. Patient was left with thorough information on Advance Medical Directives, its importance and benefits. Encouraged to think about it and call on the  for assistance. Advance Care Planning Documents (Patient Wishes):  None     Assessment:  Patient's ACP decisions will depend on outcome of conversation between her and her daughter. Will inform  for assistance if needed.   Interventions:  Provided education on documents for clarity and greater understanding  Discussed and provided education on state decision maker hierarchy  Reviewed but did not complete ACP document    Care Preferences Communicated:   No    Outcomes/Plan:  ACP Discussion: Completed    Electronically signed by Machelle Miller, [address] [place] [address] on 9/1/2023 at 5:34 PM

## 2023-09-01 NOTE — CONSULTS
[facility] - Consult Note    Cardiology consultation request from Dr. Abdoul Carmichael for evaluation and management/treatment of acute chf, elevated troponin     Date of  Admission: 8/31/2023  6:49 PM   Primary Care Physician:  No primary care provider on file. Attending Cardiologist: Dr. Noe Galloway:     -Acute hypoxic respiratory failure, in setting of below.   -Acute CHF with historically normal LV and diastolic function, SOB, orthopnea, elevated proBNP, pleural effusions on CT. Echo 7/13/2022:  EF 55-60% with normal wall motion, normal diastolic function  -Chest pain, atypical and differing from her prior anginal complaints. -NSTEMI, likely type 2 in setting of above but cannot r/o type 1 at this time. -CAD, s/p CABG x 3 (LIMA-LAD, SVG-diagonal, SVG-OM) by Dr. Wagner Mon 7/15/2022  -HTN, on lopressor. -HLD  -Hx Tobacco abuse. Primary cardiologist is Dr. Bettie Kelley:       I saw, evaluated, interviewed and examined the patient personally. With known history of CAD s/p CABG in 2022. Came in with slowly worsening dyspnea over last couple weeks however 2 days ago patient had a chest discomfort where she felt like chest tightness lasting for about 10 to 15 minutes. She came to hospital after instructed by primary care provider yesterday for possible CHF. Currently she has no chest pain. Dyspnea is improving. Hemodynamically stable. Exam with slightly decreased breath sound. No obvious murmur. No significant edema. Abdomen is soft  EKG reviewed with subtle ST depression but no significant other changes  Echocardiogram reviewed    Recommendation:  -Acute respite distress likely congestive heart failure. Echo with low normal EF. Agree with IV diuresis.   Able to lay flat  -Elevated troponin and non-STEMI type I versus type II in a patient with a history of CAD and also clinical congestive heart failure  -Aspirin, IV heparin, beta-blocker and other guideline Ventricular Rate 88    Atrial Rate 88    P-R Interval 156    QRS Duration 102    Q-T Interval 396    QTc Calculation (Bazett) 479    P Axis 71    R Axis -17    T Axis 81    Diagnosis      Sinus rhythm with premature atrial complexes  Possible Left atrial enlargement  Septal infarct (cited on or before 16-JUL-2022)  Abnormal ECG  When compared with ECG of 16-JUL-2022 16:23,  Sinus rhythm has replaced Junctional rhythm  RSR' pattern in V1 is no longer present  Criteria for Inferior infarct are no longer present  Questionable change in initial forces of Anteroseptal leads       No results found for this or any previous visit. No results found for this or any previous visit. No results found for this or any previous visit. Xray Result (most recent):  XR CHEST PORTABLE 08/31/2023    Narrative  Examination: Portable AP chest    History: Shortness of breath    Comparison: July 25, 2022    Findings: There is a small right pleural effusion. There is probable mild  pulmonary edema. No pneumothorax. Cardiomegaly with prior CABG. No acute osseous  abnormality. Impression  1. Small right pleural effusion with mild pulmonary edema. Superimposed  infiltrate is not excluded.       Signed By: Olga Aase, PA-C     September 1, 2023

## 2023-09-01 NOTE — ED NOTES
Room air trial unsuccessful, patient desat to 88%, placed back on 2L NC     Mp Oleary, [address]  08/31/23 3845

## 2023-09-01 NOTE — ED NOTES
Assumed care of patient from [facility] [address] [facility] [address], RN  09/01/23 0345

## 2023-09-01 NOTE — H&P
History & Physical    Patient: Johnson Bah MRN: [id number]  CSN: [id number]    YOB: 1960  Age: 70 y.o. Sex: female      DOA: 8/31/2023  CC: shortness of breath, cough    PCP: No primary care provider on file. HPI:     Johnson Bah is a 70 y.o. female with medical co-morbidities including HTN, CAD with CABG x3 (LIMA to LAD, reverse SVA to diagonal, and OM in 7/2022), hyperlipidemia, h/o tobacco smoking disorder, presented to the ER with worsened shortness of breath. She reported that her symptoms have been about 1 week. She noted worsened SOB with mild exertion. She slept with 2 pillows, could not lay flat. She has cough and congestion but could not produce sputum. She reported weight gain since last year. She has been eating more salting food recently. She was seen in [place] for cough and shortness of breath where she was prescribed Doxycycline, Prednisone, Albuterol, but her symptom worsened, hence she was sent to the ER for further evaluation. She no longer smoke tobacco     In the ER, she was found hypoxic with O2 saturated at 88% on room air. She required oxygen supplementation. Lab showed elevated ProBNP, HS-troponin, D-dimer. Negative rapid covid/flu. CTA chest was negative for PE. She has moderate right>left pleural effusion, faint patchy groundglass opacities. She was given Lasix 10mg IV by the ER team.           Review of Systems  GENERAL: No fever, No chill, + malaise   HEENT: No change in vision, no ear ache, no sore throat or sinus congestion. NECK: No pain or stiffness. PULMONARY: + shortness of breath, + cough or wheeze. Cardiovascular: no pnd / +orthopnea, no Chest Pain  GASTROINTESTINAL: No abd pain, No nausea/vomiting, No diarrhea, No melena or bright red blood per rectum. GENITOURINARY: No urinary frequency, No urgency or pain with urination. MUSCULOSKELETAL: No joint or muscle pain, no back pain, no recent trauma.    DERMATOLOGIC: No rash, no

## 2023-09-02 VITALS
BODY MASS INDEX: 21.94 KG/M2 | HEART RATE: 64 BPM | HEIGHT: 62 IN | WEIGHT: 119.2 LBS | RESPIRATION RATE: 18 BRPM | OXYGEN SATURATION: 96 % | SYSTOLIC BLOOD PRESSURE: 104 MMHG | DIASTOLIC BLOOD PRESSURE: 63 MMHG | TEMPERATURE: 98.5 F

## 2023-09-02 PROBLEM — I50.33 ACUTE ON CHRONIC HEART FAILURE WITH PRESERVED EJECTION FRACTION (HCC): Status: ACTIVE | Noted: 2023-09-01

## 2023-09-02 PROBLEM — I10 PRIMARY HYPERTENSION: Status: ACTIVE | Noted: 2023-09-02

## 2023-09-02 PROBLEM — E78.5 HLD (HYPERLIPIDEMIA): Status: ACTIVE | Noted: 2023-09-02

## 2023-09-02 PROBLEM — I24.89 DEMAND ISCHEMIA: Status: ACTIVE | Noted: 2023-09-02

## 2023-09-02 PROBLEM — I24.8 DEMAND ISCHEMIA (HCC): Status: ACTIVE | Noted: 2023-09-02

## 2023-09-02 LAB
ANION GAP SERPL CALC-SCNC: 7 MMOL/L (ref 3–18)
APTT PPP: 28.4 SEC (ref 23–36.4)
BASOPHILS # BLD: 0.1 K/UL (ref 0–0.1)
BASOPHILS NFR BLD: 1 % (ref 0–2)
BUN SERPL-MCNC: 19 MG/DL (ref 7–18)
BUN/CREAT SERPL: 18 (ref 12–20)
CALCIUM SERPL-MCNC: 9.5 MG/DL (ref 8.5–10.1)
CHLORIDE SERPL-SCNC: 102 MMOL/L (ref 100–111)
CO2 SERPL-SCNC: 28 MMOL/L (ref 21–32)
CREAT SERPL-MCNC: 1.08 MG/DL (ref 0.6–1.3)
DIFFERENTIAL METHOD BLD: ABNORMAL
EOSINOPHIL # BLD: 0.1 K/UL (ref 0–0.4)
EOSINOPHIL NFR BLD: 1 % (ref 0–5)
ERYTHROCYTE [DISTWIDTH] IN BLOOD BY AUTOMATED COUNT: 13.7 % (ref 11.6–14.5)
GLUCOSE SERPL-MCNC: 128 MG/DL (ref 74–99)
HCT VFR BLD AUTO: 35.6 % (ref 35–45)
HGB BLD-MCNC: 11.7 G/DL (ref 12–16)
IMM GRANULOCYTES # BLD AUTO: 0 K/UL (ref 0–0.04)
IMM GRANULOCYTES NFR BLD AUTO: 0 % (ref 0–0.5)
LDLC SERPL DIRECT ASSAY-MCNC: 19 MG/DL (ref 0–100)
LYMPHOCYTES # BLD: 1.3 K/UL (ref 0.9–3.6)
LYMPHOCYTES NFR BLD: 12 % (ref 21–52)
MAGNESIUM SERPL-MCNC: 2.2 MG/DL (ref 1.6–2.6)
MCH RBC QN AUTO: 30.2 PG (ref 24–34)
MCHC RBC AUTO-ENTMCNC: 32.9 G/DL (ref 31–37)
MCV RBC AUTO: 92 FL (ref 78–100)
MONOCYTES # BLD: 1.4 K/UL (ref 0.05–1.2)
MONOCYTES NFR BLD: 13 % (ref 3–10)
NEUTS SEG # BLD: 7.6 K/UL (ref 1.8–8)
NEUTS SEG NFR BLD: 73 % (ref 40–73)
NRBC # BLD: 0 K/UL (ref 0–0.01)
NRBC BLD-RTO: 0 PER 100 WBC
PLATELET # BLD AUTO: 233 K/UL (ref 135–420)
PMV BLD AUTO: 11.3 FL (ref 9.2–11.8)
POTASSIUM SERPL-SCNC: 4 MMOL/L (ref 3.5–5.5)
RBC # BLD AUTO: 3.87 M/UL (ref 4.2–5.3)
SODIUM SERPL-SCNC: 137 MMOL/L (ref 136–145)
WBC # BLD AUTO: 10.4 K/UL (ref 4.6–13.2)

## 2023-09-02 PROCEDURE — 85730 THROMBOPLASTIN TIME PARTIAL: CPT

## 2023-09-02 PROCEDURE — 2580000003 HC RX 258: Performed by: INTERNAL MEDICINE

## 2023-09-02 PROCEDURE — 6370000000 HC RX 637 (ALT 250 FOR IP): Performed by: INTERNAL MEDICINE

## 2023-09-02 PROCEDURE — 6370000000 HC RX 637 (ALT 250 FOR IP): Performed by: PHYSICIAN ASSISTANT

## 2023-09-02 PROCEDURE — 85025 COMPLETE CBC W/AUTO DIFF WBC: CPT

## 2023-09-02 PROCEDURE — 80048 BASIC METABOLIC PNL TOTAL CA: CPT

## 2023-09-02 PROCEDURE — 36415 COLL VENOUS BLD VENIPUNCTURE: CPT

## 2023-09-02 PROCEDURE — 83721 ASSAY OF BLOOD LIPOPROTEIN: CPT

## 2023-09-02 PROCEDURE — 83735 ASSAY OF MAGNESIUM: CPT

## 2023-09-02 PROCEDURE — 99239 HOSP IP/OBS DSCHRG MGMT >30: CPT | Performed by: PHYSICIAN ASSISTANT

## 2023-09-02 RX ORDER — FUROSEMIDE 20 MG/1
20 TABLET ORAL DAILY
Status: DISCONTINUED | OUTPATIENT
Start: 2023-09-03 | End: 2023-09-02

## 2023-09-02 RX ORDER — ISOSORBIDE MONONITRATE 30 MG/1
30 TABLET, EXTENDED RELEASE ORAL DAILY
Qty: 30 TABLET | Refills: 1 | Status: SHIPPED | OUTPATIENT
Start: 2023-09-02

## 2023-09-02 RX ORDER — ISOSORBIDE MONONITRATE 30 MG/1
30 TABLET, EXTENDED RELEASE ORAL DAILY
Status: DISCONTINUED | OUTPATIENT
Start: 2023-09-02 | End: 2023-09-02 | Stop reason: HOSPADM

## 2023-09-02 RX ORDER — FUROSEMIDE 20 MG/1
20 TABLET ORAL EVERY OTHER DAY
Qty: 60 TABLET | Refills: 1 | Status: SHIPPED | OUTPATIENT
Start: 2023-09-02

## 2023-09-02 RX ORDER — FUROSEMIDE 20 MG/1
20 TABLET ORAL EVERY OTHER DAY
Qty: 60 TABLET | Refills: 1 | Status: SHIPPED | OUTPATIENT
Start: 2023-09-02 | End: 2023-09-02 | Stop reason: SDUPTHER

## 2023-09-02 RX ORDER — ATORVASTATIN CALCIUM 80 MG/1
80 TABLET, FILM COATED ORAL DAILY
Qty: 30 TABLET | Refills: 1 | Status: SHIPPED | OUTPATIENT
Start: 2023-09-03

## 2023-09-02 RX ORDER — ATORVASTATIN CALCIUM 80 MG/1
80 TABLET, FILM COATED ORAL DAILY
Qty: 30 TABLET | Refills: 1 | Status: SHIPPED | OUTPATIENT
Start: 2023-09-03 | End: 2023-09-02 | Stop reason: SDUPTHER

## 2023-09-02 RX ORDER — ISOSORBIDE MONONITRATE 30 MG/1
30 TABLET, EXTENDED RELEASE ORAL DAILY
Qty: 30 TABLET | Refills: 1 | Status: SHIPPED | OUTPATIENT
Start: 2023-09-02 | End: 2023-09-02 | Stop reason: SDUPTHER

## 2023-09-02 RX ORDER — FUROSEMIDE 20 MG/1
20 TABLET ORAL EVERY OTHER DAY
Status: DISCONTINUED | OUTPATIENT
Start: 2023-09-02 | End: 2023-09-02 | Stop reason: HOSPADM

## 2023-09-02 RX ADMIN — SODIUM CHLORIDE, PRESERVATIVE FREE 5 ML: 5 INJECTION INTRAVENOUS at 08:59

## 2023-09-02 RX ADMIN — ASPIRIN 81 MG: 81 TABLET, COATED ORAL at 08:57

## 2023-09-02 RX ADMIN — FAMOTIDINE 20 MG: 20 TABLET ORAL at 08:58

## 2023-09-02 RX ADMIN — ATORVASTATIN CALCIUM 80 MG: 40 TABLET, FILM COATED ORAL at 08:58

## 2023-09-02 RX ADMIN — CLOPIDOGREL BISULFATE 75 MG: 75 TABLET ORAL at 08:57

## 2023-09-02 RX ADMIN — METOPROLOL SUCCINATE 25 MG: 25 TABLET, EXTENDED RELEASE ORAL at 08:57

## 2023-09-02 NOTE — PLAN OF CARE
Problem: Discharge Planning  Goal: Discharge to home or other facility with appropriate resources  Outcome: Completed  Flowsheets (Taken 9/2/2023 4719)  Discharge to home or other facility with appropriate resources: Identify barriers to discharge with patient and caregiver     Problem: ABCDS Injury Assessment  Goal: Absence of physical injury  Outcome: Completed

## 2023-09-02 NOTE — PLAN OF CARE
Problem: Discharge Planning  Goal: Discharge to home or other facility with appropriate resources  Outcome: Progressing  Flowsheets (Taken 9/1/2023 1015)  Discharge to home or other facility with appropriate resources: Identify barriers to discharge with patient and caregiver     Problem: ABCDS Injury Assessment  Goal: Absence of physical injury  Outcome: Progressing

## 2023-09-02 NOTE — PROGRESS NOTES
[facility] - [facility] Note    Admit Date: 8/31/2023  Attending Cardiologist: Dr. Gonzalez Forward     Assessment:     -Acute hypoxic respiratory failure, in setting of below. Improved. -Acute CHF with historically normal LV and diastolic function, SOB, orthopnea, elevated proBNP, pleural effusions on CT. Echo 7/13/2022:  EF 55-60% with normal wall motion, normal diastolic function  Echo this admission, EF 50-55%  -Chest pain, atypical and differing from her prior anginal complaints. -NSTEMI, likely type 2 in setting of above but cannot r/o type 1 at this time given underlying hx CAD. -CAD, s/p CABG x 3 (LIMA-LAD, SVG-diagonal, SVG-OM) by Dr. Tonia Rodarte 7/15/2022  S/p [facility] 9/1/23, Patient has a severe native vessel as well as vein graft vessel disease. All the critical stenosis appears to be very small caliber vessel, less than 2 mm, med mgmt. -HTN, on lopressor. -HLD  -moderate pulmonary hypertension by Echo.   -Hx Tobacco abuse. Primary cardiologist is Dr. Valdes Schooling:       I saw, evaluated, interviewed and examined the patient personally. Patient had catheterization yesterday. States that dyspnea has improved significantly. She would like to go home. No chest pain  Hemodynamically stable. On exam minimal Rales, no edema. Abdomen is soft. No obvious murmur  Labs reviewed    Recommendation:  -Cardiac catheterization with severe native vessel as well as graft disease beyond anastomosis  -Angiogram reviewed with other colleagues and patient's native coronary vessels are very small for percutaneous core intervention 1.5-1.75 mm in size  -Discussed with the patient cardiac catheterization finding in great detail. Discussed with the patient that probably medical management will be best option at this time. Patient verbalizes to understand  -Continue aspirin. Started Plavix  -Increase dose of atorvastatin to 80 mg daily  -Continue beta-blocker.   Start Imdur 30 mg daily  -Oral

## 2023-09-02 NOTE — PROGRESS NOTES
Notified by pts nurse that pt is in need of a PCP. Unable to find PCP at this time related to doctor offices being closed. Will notify case management office on Tuesday to please find pt a PCP.     [facility] RN CDCES  Case Management

## 2023-09-02 NOTE — CARE COORDINATION
Case Management Assessment  Initial Evaluation    Date/Time of Evaluation: 9/2/2023 10:31 AM  Assessment Completed by: Kasey Lance RN    If patient is discharged prior to next notation, then this note serves as note for discharge by case management. Patient Name: Lillian Trujillo                   YOB: 1960  Diagnosis: Hypoxia [R09.02]  NSTEMI (non-ST elevated myocardial infarction) [facility]) [I21.4]  Acute heart failure, unspecified heart failure type [facility]) [I50.9]                   Date / Time: 8/31/2023  6:49 PM    Patient Admission Status: Inpatient   Readmission Risk (Low < 19, Mod (19-27), High > 27): Readmission Risk Score: 8.3    Current PCP: No primary care provider on file. PCP verified by CM? (P) Yes (Dr. Geovany Cates with [facility])    Chart Reviewed: Yes      History Provided by: (P) Patient  Patient Orientation: (P) Alert and Oriented    Patient Cognition: (P) Alert    Hospitalization in the last 30 days (Readmission):  No    If yes, Readmission Assessment in CM Navigator will be completed. Advance Directives:      Code Status: Full Code   Patient's Primary Decision Maker is:        Discharge Planning:    Patient lives with: (P) Alone, Other (Comment) (rooming house) Type of Home: (P) House  Primary Care Giver: (P) Self  Patient Support Systems include: (P) Family Members, Children   Current Financial resources: (P) Medicare  Current community resources:    Current services prior to admission: None            Current DME:              Type of Home Care services:  (P) None    ADLS  Prior functional level: (P) Independent in ADLs/IADLs  Current functional level: (P) Independent in ADLs/IADLs    PT AM-PAC:   /24  OT AM-PAC:   /24    Family can provide assistance at DC: (P) Yes  Would you like Case Management to discuss the discharge plan with any other family members/significant others, and if so, who?     Plans to Return to Present Housing: (P) Yes  Other Identified Issues/Barriers to

## 2023-09-02 NOTE — PROGRESS NOTES
Spoke with RN regarding no urine output documented despite strict I&Os. Confirmed patient is urinating. Per RN, flowsheets will be updated shortly.

## 2023-09-02 NOTE — DISCHARGE SUMMARY
[facility] Hospitalist [facility]    Discharge Summary    Patient: Ethel Birmingham MRN: [id number]  [facility]: [id number]    YOB: 1960  Age: 70 y.o. Sex: female    DOA: 8/31/2023 LOS:  LOS: 1 day   Discharge Date:      Admission Diagnoses: Hypoxia [R09.02]  NSTEMI (non-ST elevated myocardial infarction) ([address]) [I21.4]  Acute heart failure, unspecified heart failure type ([address]) [I50.9]    Discharge Diagnoses:    Hospital Problems             Last Modified POA    * (Principal) Hypoxia 9/1/2023 Yes    S/P CABG x 3 9/2/2023 Yes    Overview Addendum 9/2/2023  2:12 PM by FRANCISCO J [address] 7/15/2022:  Triple Coronary Artery Bypass Surgery with LIMA to   LAD, Reverse Saphenous Vein Grafts to the Diagonal and Obtuse Marginal   Coronary Arteries         Acute on chronic heart failure with preserved ejection fraction ([address]) 9/2/2023 Yes    Acute respiratory failure with hypoxia ([address]) 9/1/2023 Yes    Pleural effusion 9/1/2023 Yes    Demand ischemia ([address]) 9/2/2023 Yes    Primary hypertension 9/2/2023 Yes    HLD (hyperlipidemia) 9/2/2023 Yes         Discharge Condition: Stable    Discharge To: Home    Consults: Cardiology    HPI: Per admitting provider, Ethel Birmingham is a 70 y.o. female with medical co-morbidities including HTN, CAD with CABG x3 (LIMA to LAD, reverse SVA to diagonal, and OM in 7/2022), hyperlipidemia, h/o tobacco smoking disorder, presented to the ER with worsened shortness of breath. She reported that her symptoms have been about 1 week. She noted worsened SOB with mild exertion. She slept with 2 pillows, could not lay flat. She has cough and congestion but could not produce sputum. She reported weight gain since last year. She has been eating more salting food recently. She was seen in [place] for cough and shortness of breath where she was prescribed Doxycycline, Prednisone, Albuterol, but her symptom worsened, hence she was sent to the ER for further evaluation.

## 2023-09-02 NOTE — PROGRESS NOTES
Discharge order noted for today. Orders received. No needs identified at this time. Case management remains available as needed. Patient states that her daughter will transport her home at time of discharge.     [facility] RN MEL  Case Management

## 2023-09-05 NOTE — CARE COORDINATION
Follow up call made to patient, patient stated that she goes to [facility], Dr. Janae Vega, she will call and schedule her own appointment. Call made to [facility], made office aware that patient was discharged from the hospital on 9/02/2023, office hospital discharge will contact patient with an appointment.

## 2023-10-02 ENCOUNTER — OFFICE VISIT (OUTPATIENT)
Age: 63
End: 2023-10-02
Payer: MEDICARE

## 2023-10-02 VITALS
BODY MASS INDEX: 21.77 KG/M2 | WEIGHT: 119 LBS | HEART RATE: 67 BPM | SYSTOLIC BLOOD PRESSURE: 120 MMHG | DIASTOLIC BLOOD PRESSURE: 60 MMHG | OXYGEN SATURATION: 99 %

## 2023-10-02 DIAGNOSIS — I10 ESSENTIAL HYPERTENSION WITH GOAL BLOOD PRESSURE LESS THAN 140/90: ICD-10-CM

## 2023-10-02 DIAGNOSIS — I25.83 CORONARY ARTERY DISEASE DUE TO LIPID RICH PLAQUE: Primary | ICD-10-CM

## 2023-10-02 DIAGNOSIS — E78.00 PURE HYPERCHOLESTEROLEMIA: ICD-10-CM

## 2023-10-02 DIAGNOSIS — I25.10 CORONARY ARTERY DISEASE DUE TO LIPID RICH PLAQUE: Primary | ICD-10-CM

## 2023-10-02 PROCEDURE — 3078F DIAST BP <80 MM HG: CPT | Performed by: INTERNAL MEDICINE

## 2023-10-02 PROCEDURE — G8400 PT W/DXA NO RESULTS DOC: HCPCS | Performed by: INTERNAL MEDICINE

## 2023-10-02 PROCEDURE — 1090F PRES/ABSN URINE INCON ASSESS: CPT | Performed by: INTERNAL MEDICINE

## 2023-10-02 PROCEDURE — 99214 OFFICE O/P EST MOD 30 MIN: CPT | Performed by: INTERNAL MEDICINE

## 2023-10-02 PROCEDURE — 3074F SYST BP LT 130 MM HG: CPT | Performed by: INTERNAL MEDICINE

## 2023-10-02 PROCEDURE — G8420 CALC BMI NORM PARAMETERS: HCPCS | Performed by: INTERNAL MEDICINE

## 2023-10-02 PROCEDURE — G8484 FLU IMMUNIZE NO ADMIN: HCPCS | Performed by: INTERNAL MEDICINE

## 2023-10-02 PROCEDURE — 1123F ACP DISCUSS/DSCN MKR DOCD: CPT | Performed by: INTERNAL MEDICINE

## 2023-10-02 PROCEDURE — G8428 CUR MEDS NOT DOCUMENT: HCPCS | Performed by: INTERNAL MEDICINE

## 2023-10-02 PROCEDURE — 1036F TOBACCO NON-USER: CPT | Performed by: INTERNAL MEDICINE

## 2023-10-02 PROCEDURE — 1111F DSCHRG MED/CURRENT MED MERGE: CPT | Performed by: INTERNAL MEDICINE

## 2023-10-02 PROCEDURE — 3017F COLORECTAL CA SCREEN DOC REV: CPT | Performed by: INTERNAL MEDICINE

## 2023-10-02 RX ORDER — METHOCARBAMOL 500 MG/1
TABLET, FILM COATED ORAL
COMMUNITY
Start: 2023-09-14

## 2023-10-02 RX ORDER — CARVEDILOL 3.12 MG/1
TABLET ORAL
COMMUNITY
Start: 2023-09-14

## 2023-10-02 NOTE — PROGRESS NOTES
[facility]    Adilene Martinez is 70 y.o. female with history of CAD status post CABG, hyperlipidemia and tobacco use disorder    Patient is here today for follow-up appointment for her CAD, hyperlipidemia  Patient was admitted to hospital in 07/2022 with unstable  angina and non-STEMI. Cardiac catheterization was performed which showed severe three-vessel coronary disease. Patient eventually underwent CABG x3 with LIMA to LAD, reverse SVG to diagonal branch and OM branch in 07/2022  Full admission in 09/2023 with significant dyspnea and slightly elevated troponin. LHC showed significant native vessel disease as well as graft disease which were too small to intervene upon so medical management was recommended    Denies any resting or exertional chest pain or chest pressure to suggest angina or any dyspnea to suggest heart failure. No presyncope or syncope  Since starting Lasix, her coughing, dyspnea has resolved. Denies any PND or LE edema. Taking all medications regularly. Past Medical History:   Diagnosis Date    Arthritis     CAD (coronary artery disease)     HLD (hyperlipidemia)     Tobacco abuse        Review of Systems:  Cardiac symptoms as noted above in HPI. All others negative.     Current Outpatient Medications   Medication Sig    carvedilol (COREG) 3.125 MG tablet     methocarbamol (ROBAXIN) 500 MG tablet     isosorbide mononitrate (IMDUR) 30 MG extended release tablet Take 1 tablet by mouth daily    atorvastatin (LIPITOR) 80 MG tablet Take 1 tablet by mouth daily    furosemide (LASIX) 20 MG tablet Take 1 tablet by mouth every other day    albuterol sulfate HFA (PROVENTIL;VENTOLIN;PROAIR) 108 (90 Base) MCG/ACT inhaler 2 puffs every 4 hours as needed for Wheezing or Shortness of Breath    DULoxetine (CYMBALTA) 30 MG extended release capsule Take 1 capsule by mouth daily    tiZANidine (ZANAFLEX) 2 MG tablet Take 1 tablet by mouth

## 2023-12-11 ENCOUNTER — TRANSCRIBE ORDERS (OUTPATIENT)
Facility: HOSPITAL | Age: 63
End: 2023-12-11

## 2023-12-11 DIAGNOSIS — Z12.31 ENCOUNTER FOR MAMMOGRAM TO ESTABLISH BASELINE MAMMOGRAM: Primary | ICD-10-CM

## 2024-01-02 ENCOUNTER — HOSPITAL ENCOUNTER (OUTPATIENT)
Facility: HOSPITAL | Age: 64
Discharge: HOME | End: 2024-01-05
Payer: MEDICARE

## 2024-01-02 VITALS — BODY MASS INDEX: 22.82 KG/M2 | WEIGHT: 124 LBS | HEIGHT: 62 IN

## 2024-01-02 DIAGNOSIS — Z12.31 ENCOUNTER FOR MAMMOGRAM TO ESTABLISH BASELINE MAMMOGRAM: ICD-10-CM

## 2024-01-02 PROCEDURE — 77067 SCR MAMMO BI INCL CAD: CPT

## 2024-08-12 ENCOUNTER — OFFICE VISIT (OUTPATIENT)
Age: 64
End: 2024-08-12

## 2024-08-12 VITALS
WEIGHT: 130 LBS | BODY MASS INDEX: 23.78 KG/M2 | DIASTOLIC BLOOD PRESSURE: 60 MMHG | SYSTOLIC BLOOD PRESSURE: 120 MMHG | HEART RATE: 72 BPM | OXYGEN SATURATION: 99 %

## 2024-08-12 DIAGNOSIS — E78.49 OTHER HYPERLIPIDEMIA: Primary | ICD-10-CM

## 2024-08-12 DIAGNOSIS — I21.4 NSTEMI (NON-ST ELEVATED MYOCARDIAL INFARCTION) (HCC): ICD-10-CM

## 2024-08-12 RX ORDER — RANOLAZINE 500 MG/1
500 TABLET, EXTENDED RELEASE ORAL 2 TIMES DAILY
Qty: 180 TABLET | Refills: 2 | Status: SHIPPED | OUTPATIENT
Start: 2024-08-12

## 2024-08-12 NOTE — PROGRESS NOTES
distress. No ronchi or rales appreciated  Abdominal: No tenderness. No rebound and no guarding.   Musculoskeletal: There is no lower extremity edema. No cynosis      LABS:   @  Lab Results   Component Value Date/Time    WBC 10.4 09/02/2023 03:51 AM    HGB 11.7 09/02/2023 03:51 AM    HCT 35.6 09/02/2023 03:51 AM     09/02/2023 03:51 AM    MCV 92.0 09/02/2023 03:51 AM     Lab Results   Component Value Date/Time     09/02/2023 03:51 AM    K 4.0 09/02/2023 03:51 AM     09/02/2023 03:51 AM    CO2 28 09/02/2023 03:51 AM    BUN 19 09/02/2023 03:51 AM    CREATININE 1.08 09/02/2023 03:51 AM    GLUCOSE 128 09/02/2023 03:51 AM    CALCIUM 9.5 09/02/2023 03:51 AM           Latest Ref Rng & Units 9/2/2023     3:51 AM 8/31/2023     7:00 PM 7/17/2022     4:44 AM 7/14/2022     5:39 AM 7/13/2022     4:52 PM   Lipids   Chol <200 MG/DL    127     HDL 40 - 60 MG/DL    43     LDL Calc 0 - 100 MG/DL    73.8     LDL 0 - 100 mg/dl 19        VLDL Calc MG/DL    10.2     Trig <150 MG/DL    51     Ratio 0 - 5.0    3.0     ALT 13 - 56 U/L  31  21   14    AST 10 - 38 U/L  27  125   38    LDL 0 - 100 MG/DL    73.8       Lab Results   Component Value Date/Time    ALT 31 08/31/2023 07:00 PM     Hemoglobin A1C   Date Value Ref Range Status   07/13/2022 5.9 (H) 4.2 - 5.6 % Final     Comment:     (NOTE)  HbA1C Interpretive Ranges  <5.7              Normal  5.7 - 6.4         Consider Prediabetes  >6.5              Consider Diabetes       Lab Results   Component Value Date    TSH 0.68 09/01/2023 08/31/23    ECHO (TTE) LIMITED (CONTRAST/BUBBLE/3D PRN) 09/01/2023 11:01 AM (Final)    Interpretation Summary    Left Ventricle: Low normal left ventricular systolic function with a visually estimated EF of 50 - 55%. Left ventricle size is normal. Normal wall thickness. Mild global hypokinesis present.    Right Ventricle: Reduced systolic function. TAPSE is abnormal. TDI systolic excursion is abnormal.    Mitral Valve: Mild to moderate

## 2024-08-26 ENCOUNTER — HOSPITAL ENCOUNTER (OUTPATIENT)
Facility: HOSPITAL | Age: 64
Discharge: HOME | End: 2024-08-29
Payer: MEDICARE

## 2024-08-26 DIAGNOSIS — E78.49 OTHER HYPERLIPIDEMIA: ICD-10-CM

## 2024-08-26 LAB
CHOLEST SERPL-MCNC: 92 MG/DL
HDLC SERPL-MCNC: 44 MG/DL (ref 40–60)
HDLC SERPL: 2.1 (ref 0–5)
LDLC SERPL CALC-MCNC: 36.4 MG/DL (ref 0–100)
LIPID PANEL: NORMAL
TRIGL SERPL-MCNC: 58 MG/DL
VLDLC SERPL CALC-MCNC: 11.6 MG/DL

## 2024-08-26 PROCEDURE — 36415 COLL VENOUS BLD VENIPUNCTURE: CPT

## 2024-08-26 PROCEDURE — 80061 LIPID PANEL: CPT

## 2025-02-04 RX ORDER — RANOLAZINE 500 MG/1
500 TABLET, EXTENDED RELEASE ORAL 2 TIMES DAILY
Qty: 180 TABLET | Refills: 1 | Status: SHIPPED | OUTPATIENT
Start: 2025-02-04

## 2025-03-03 ENCOUNTER — OFFICE VISIT (OUTPATIENT)
Age: 65
End: 2025-03-03
Payer: MEDICARE

## 2025-03-03 VITALS
WEIGHT: 143 LBS | OXYGEN SATURATION: 99 % | DIASTOLIC BLOOD PRESSURE: 60 MMHG | BODY MASS INDEX: 26.31 KG/M2 | SYSTOLIC BLOOD PRESSURE: 110 MMHG | HEIGHT: 62 IN | HEART RATE: 61 BPM

## 2025-03-03 DIAGNOSIS — I25.10 CORONARY ARTERY DISEASE DUE TO LIPID RICH PLAQUE: Primary | ICD-10-CM

## 2025-03-03 DIAGNOSIS — I25.83 CORONARY ARTERY DISEASE DUE TO LIPID RICH PLAQUE: Primary | ICD-10-CM

## 2025-03-03 DIAGNOSIS — I10 ESSENTIAL HYPERTENSION WITH GOAL BLOOD PRESSURE LESS THAN 140/90: ICD-10-CM

## 2025-03-03 DIAGNOSIS — E78.5 HYPERLIPIDEMIA, UNSPECIFIED HYPERLIPIDEMIA TYPE: ICD-10-CM

## 2025-03-03 PROCEDURE — 99213 OFFICE O/P EST LOW 20 MIN: CPT | Performed by: PHYSICIAN ASSISTANT

## 2025-03-03 PROCEDURE — 3078F DIAST BP <80 MM HG: CPT | Performed by: PHYSICIAN ASSISTANT

## 2025-03-03 PROCEDURE — G8428 CUR MEDS NOT DOCUMENT: HCPCS | Performed by: PHYSICIAN ASSISTANT

## 2025-03-03 PROCEDURE — 1126F AMNT PAIN NOTED NONE PRSNT: CPT | Performed by: PHYSICIAN ASSISTANT

## 2025-03-03 PROCEDURE — 3074F SYST BP LT 130 MM HG: CPT | Performed by: PHYSICIAN ASSISTANT

## 2025-03-03 PROCEDURE — G8400 PT W/DXA NO RESULTS DOC: HCPCS | Performed by: PHYSICIAN ASSISTANT

## 2025-03-03 PROCEDURE — G8419 CALC BMI OUT NRM PARAM NOF/U: HCPCS | Performed by: PHYSICIAN ASSISTANT

## 2025-03-03 PROCEDURE — 3017F COLORECTAL CA SCREEN DOC REV: CPT | Performed by: PHYSICIAN ASSISTANT

## 2025-03-03 PROCEDURE — 1123F ACP DISCUSS/DSCN MKR DOCD: CPT | Performed by: PHYSICIAN ASSISTANT

## 2025-03-03 PROCEDURE — 1036F TOBACCO NON-USER: CPT | Performed by: PHYSICIAN ASSISTANT

## 2025-03-03 PROCEDURE — 1090F PRES/ABSN URINE INCON ASSESS: CPT | Performed by: PHYSICIAN ASSISTANT

## 2025-03-03 NOTE — PROGRESS NOTES
[facility]    Kailey Bond is 64 y.o. female with history of CAD status post CABG, HLD and tobacco use disorder    Pt is followed by Dr. Yannick Ly.  Patient was admitted to hospital in 07/2022 with unstable  angina and non-STEMI.  Cardiac catheterization was performed which showed severe three-vessel coronary disease.  Patient eventually underwent CABG x3 with LIMA to LAD, reverse SVG to diagonal branch and OM branch in 07/2022.  Hospital admission 09/2023 with NSTEMI, LHC showed significant native vessel disease as well as graft disease which were too small to intervene upon so medical management was recommended.    She is here today for follow-up.  She says that she has been doing overall well.  She denies any chest pain/discomfort or shortness of breath.   No recent SL NTG use.  No palpitations or syncope.  No PND or LE edema.  No bleeding complaints.  She is compliant with medications.        Past Medical History:   Diagnosis Date    Arthritis     CAD (coronary artery disease)     HLD (hyperlipidemia)     Tobacco abuse        Review of Systems:  Cardiac symptoms as noted above in HPI. All others negative.    Current Outpatient Medications   Medication Sig    ranolazine (RANEXA) 500 MG extended release tablet TAKE 1 TABLET BY MOUTH 2 TIMES DAILY    carvedilol (COREG) 3.125 MG tablet     methocarbamol (ROBAXIN) 500 MG tablet     isosorbide mononitrate (IMDUR) 30 MG extended release tablet Take 1 tablet by mouth daily    atorvastatin (LIPITOR) 80 MG tablet Take 1 tablet by mouth daily    furosemide (LASIX) 20 MG tablet Take 1 tablet by mouth every other day    albuterol sulfate HFA (PROVENTIL;VENTOLIN;PROAIR) 108 (90 Base) MCG/ACT inhaler 2 puffs every 4 hours as needed for Wheezing or Shortness of Breath    DULoxetine (CYMBALTA) 30 MG extended release capsule Take 1 capsule by mouth daily    tiZANidine (ZANAFLEX) 2 MG tablet Take 1 tablet by mouth every 8 hours as needed

## 2025-03-14 ENCOUNTER — HOSPITAL ENCOUNTER (OUTPATIENT)
Facility: HOSPITAL | Age: 65
Discharge: HOME | End: 2025-03-17
Payer: MEDICARE

## 2025-03-14 VITALS — HEIGHT: 62 IN | BODY MASS INDEX: 25.21 KG/M2 | WEIGHT: 137 LBS

## 2025-03-14 DIAGNOSIS — Z12.31 VISIT FOR SCREENING MAMMOGRAM: ICD-10-CM

## 2025-03-14 PROCEDURE — 77063 BREAST TOMOSYNTHESIS BI: CPT

## 2025-05-06 RX ORDER — RANOLAZINE 500 MG/1
500 TABLET, EXTENDED RELEASE ORAL 2 TIMES DAILY
Qty: 180 TABLET | Refills: 3 | Status: SHIPPED | OUTPATIENT
Start: 2025-05-06

## 2025-05-29 ENCOUNTER — OFFICE VISIT (OUTPATIENT)
Age: 65
End: 2025-05-29

## 2025-05-29 VITALS — HEIGHT: 62 IN | WEIGHT: 142.64 LBS | BODY MASS INDEX: 26.25 KG/M2

## 2025-05-29 DIAGNOSIS — M19.042 PRIMARY OSTEOARTHRITIS OF BOTH HANDS: ICD-10-CM

## 2025-05-29 DIAGNOSIS — G56.03 BILATERAL CARPAL TUNNEL SYNDROME: Primary | ICD-10-CM

## 2025-05-29 DIAGNOSIS — M65.331 TRIGGER MIDDLE FINGER OF RIGHT HAND: ICD-10-CM

## 2025-05-29 DIAGNOSIS — M19.041 PRIMARY OSTEOARTHRITIS OF BOTH HANDS: ICD-10-CM

## 2025-05-29 RX ORDER — OMEPRAZOLE 20 MG/1
CAPSULE, DELAYED RELEASE ORAL
COMMUNITY
Start: 2025-05-28

## 2025-05-29 RX ORDER — DULOXETIN HYDROCHLORIDE 60 MG/1
CAPSULE, DELAYED RELEASE ORAL
COMMUNITY
Start: 2025-05-19

## 2025-05-29 RX ORDER — LIDOCAINE HYDROCHLORIDE 10 MG/ML
0.5 INJECTION, SOLUTION INFILTRATION; PERINEURAL ONCE
Status: COMPLETED | OUTPATIENT
Start: 2025-05-29 | End: 2025-05-29

## 2025-05-29 RX ORDER — IBUPROFEN 800 MG/1
TABLET, FILM COATED ORAL
COMMUNITY
Start: 2025-05-19

## 2025-05-29 RX ADMIN — LIDOCAINE HYDROCHLORIDE 0.5 ML: 10 INJECTION, SOLUTION INFILTRATION; PERINEURAL at 11:03

## 2025-05-29 NOTE — PROGRESS NOTES
Kailey Bond is a 65 y.o. female right handed retiree.  Worker's Compensation and legal considerations: none    Chief Complaint   Patient presents with    Hand Pain     Bilateral     Pain Score:   7    Subjective:     Initial HPI: Patient presents today with complaints of bilateral hand pain as well as pain and difficulty moving her right middle finger.  She also reports numbness and tingling in bilateral hands.    Date of onset: Indeterminate  Injury: No  Prior Treatment:  No  Contributory history: None    ROS: Review of Systems - General ROS: negative except HPI    Past Medical History:   Diagnosis Date    Arthritis     CAD (coronary artery disease)     HLD (hyperlipidemia)     Tobacco abuse        Past Surgical History:   Procedure Laterality Date    CARDIAC PROCEDURE N/A 09/01/2023    Left heart cath / coronary angiography performed by Yannick QURESHI MD at [facility] CARDIAC CATH LAB    CARDIAC PROCEDURE N/A 09/01/2023    Left heart cath w coronary bypass graft performed by Yannick QURESHI MD at [facility] CARDIAC CATH LAB    HYSTERECTOMY (CERVIX STATUS UNKNOWN)      PARTIAL HYSTERECTOMY (CERVIX NOT REMOVED)          Current Outpatient Medications   Medication Sig Dispense Refill    omeprazole (PRILOSEC) 20 MG delayed release capsule       ibuprofen (ADVIL;MOTRIN) 800 MG tablet       DULoxetine (CYMBALTA) 60 MG extended release capsule       ranolazine (RANEXA) 500 MG extended release tablet Take 1 tablet by mouth 2 times daily 180 tablet 3    carvedilol (COREG) 3.125 MG tablet       methocarbamol (ROBAXIN) 500 MG tablet       isosorbide mononitrate (IMDUR) 30 MG extended release tablet Take 1 tablet by mouth daily 30 tablet 1    atorvastatin (LIPITOR) 80 MG tablet Take 1 tablet by mouth daily 30 tablet 1    furosemide (LASIX) 20 MG tablet Take 1 tablet by mouth every other day 60 tablet 1    albuterol sulfate HFA (PROVENTIL;VENTOLIN;PROAIR) 108 (90 Base) MCG/ACT inhaler 2 puffs every 4 hours as needed for Wheezing or

## 2025-07-16 ENCOUNTER — PROCEDURE VISIT (OUTPATIENT)
Age: 65
End: 2025-07-16
Payer: MEDICARE

## 2025-07-16 VITALS
WEIGHT: 140.4 LBS | BODY MASS INDEX: 25.83 KG/M2 | SYSTOLIC BLOOD PRESSURE: 124 MMHG | DIASTOLIC BLOOD PRESSURE: 65 MMHG | TEMPERATURE: 97.9 F | HEIGHT: 62 IN | OXYGEN SATURATION: 99 % | HEART RATE: 69 BPM

## 2025-07-16 DIAGNOSIS — G56.03 BILATERAL CARPAL TUNNEL SYNDROME: ICD-10-CM

## 2025-07-16 DIAGNOSIS — R20.2 NUMBNESS AND TINGLING IN BOTH HANDS: Primary | ICD-10-CM

## 2025-07-16 DIAGNOSIS — R20.0 NUMBNESS AND TINGLING IN BOTH HANDS: Primary | ICD-10-CM

## 2025-07-16 DIAGNOSIS — R94.131 ABNORMAL EMG: ICD-10-CM

## 2025-07-16 PROCEDURE — 95886 MUSC TEST DONE W/N TEST COMP: CPT | Performed by: PHYSICAL MEDICINE & REHABILITATION

## 2025-07-16 PROCEDURE — 95912 NRV CNDJ TEST 11-12 STUDIES: CPT | Performed by: PHYSICAL MEDICINE & REHABILITATION

## 2025-07-16 NOTE — PROGRESS NOTES
[facility]  [facility] [facility], [address]  [place] [address]  Phone: [phone]  Fax: [phone]    Kailey Bond  : 1960  PCP: Paola Harper, JONNATHAN - CNP  2025    ELECTROMYOGRAPHY AND NERVE CONDUCTION STUDIES    Kailey Bond was referred by Dr. Mosquera for electrodiagnostic evaluation of numbness/tingling of both hands.    NCV & EMG Findings:  Evaluation of the right median (APB) motor nerve showed prolonged distal onset latency (7.0 ms), reduced amplitude (2.7 mV), and decreased conduction velocity (49 m/s).  The left ulnar (ADM) motor nerve showed decreased conduction velocity ([place]-Wrist, 50 m/s).  The right median sensory nerve showed prolonged distal onset latency (4.2 ms), prolonged distal peak latency (5.0 ms), and reduced amplitude (5 µV).  The left median-ulnar (dig IV) sensory nerve showed abnormal peak latency difference ((Median Wrist)-(Ulnar Wrist), 1.0 ms).  All remaining nerves (as indicated in the following tables) were within normal limits    All examined muscles (as indicated in the following table) showed no evidence of electrical instability     INTERPRETATION  This is an abnormal electrodiagnostic examination. These findings may be consistent with:  Moderate median mononeuropathy at the right wrist (carpal tunnel syndrome)   Mild median mononeuropathy at the left wrist (carpal tunnel syndrome)     There are no electrodiagnostic findings consistent with cervical radiculopathy, brachial plexopathy, myopathy, or any other mononeuropathy.        CLINICAL INTERPRETATION  The electrodiagnostic findings of median mononeuropathy appear consistent with her hand symptoms.    HISTORY OF PRESENT ILLNESS  Kailey Bond is a 65 y.o. female.    Pt presents today with BUE EMG evaluation for both hands.    PAST MEDICAL HISTORY   Past Medical History:   Diagnosis Date    Arthritis     CAD (coronary artery disease)     HLD (hyperlipidemia)

## 2025-07-17 ENCOUNTER — OFFICE VISIT (OUTPATIENT)
Age: 65
End: 2025-07-17

## 2025-07-17 VITALS — HEIGHT: 62 IN | BODY MASS INDEX: 25.76 KG/M2 | WEIGHT: 140 LBS

## 2025-07-17 DIAGNOSIS — M65.331 TRIGGER MIDDLE FINGER OF RIGHT HAND: ICD-10-CM

## 2025-07-17 DIAGNOSIS — G56.03 BILATERAL CARPAL TUNNEL SYNDROME: Primary | ICD-10-CM

## 2025-07-17 RX ORDER — LIDOCAINE HYDROCHLORIDE 10 MG/ML
1 INJECTION, SOLUTION INFILTRATION; PERINEURAL ONCE
Status: COMPLETED | OUTPATIENT
Start: 2025-07-17 | End: 2025-07-17

## 2025-07-17 RX ADMIN — LIDOCAINE HYDROCHLORIDE 1 ML: 10 INJECTION, SOLUTION INFILTRATION; PERINEURAL at 10:00

## 2025-07-17 NOTE — PROGRESS NOTES
Kailey Bond is a 65 y.o. female right handed retiree.  Worker's Compensation and legal considerations: none    Chief Complaint   Patient presents with    Follow-up     Bilateral hand      Pain Score:   0 - No pain    Subjective:     7/17/2025 HPI: Patient presents today for follow-up of bilateral upper extremity EMGs.  At her last visit she received a right middle trigger finger injection that she reports has resolved her symptoms.  She is still having a slightly flexed posture of the middle finger however minimal to no pain.  She does report continued numbness and tingling.    Initial HPI: Patient presents today with complaints of bilateral hand pain as well as pain and difficulty moving her right middle finger.  She also reports numbness and tingling in bilateral hands.    Date of onset: Indeterminate  Injury: No  Prior Treatment:  Yes: Comment: Right middle trigger finger injection  Contributory history: None    ROS: Review of Systems - General ROS: negative except HPI    Past Medical History:   Diagnosis Date    Arthritis     CAD (coronary artery disease)     HLD (hyperlipidemia)     Tobacco abuse        Past Surgical History:   Procedure Laterality Date    CARDIAC PROCEDURE N/A 09/01/2023    Left heart cath / coronary angiography performed by Yannick QURESHI MD at [facility] CARDIAC CATH LAB    CARDIAC PROCEDURE N/A 09/01/2023    Left heart cath w coronary bypass graft performed by Yannick QURESHI MD at [facility] CARDIAC CATH LAB    HYSTERECTOMY (CERVIX STATUS UNKNOWN)      PARTIAL HYSTERECTOMY (CERVIX NOT REMOVED)          Current Outpatient Medications   Medication Sig Dispense Refill    omeprazole (PRILOSEC) 20 MG delayed release capsule       ibuprofen (ADVIL;MOTRIN) 800 MG tablet       DULoxetine (CYMBALTA) 60 MG extended release capsule       ranolazine (RANEXA) 500 MG extended release tablet Take 1 tablet by mouth 2 times daily 180 tablet 3    carvedilol (COREG) 3.125 MG tablet       methocarbamol (ROBAXIN)

## 2025-09-05 ENCOUNTER — OFFICE VISIT (OUTPATIENT)
Age: 65
End: 2025-09-05
Payer: MEDICARE

## 2025-09-05 VITALS
SYSTOLIC BLOOD PRESSURE: 106 MMHG | WEIGHT: 141 LBS | DIASTOLIC BLOOD PRESSURE: 62 MMHG | HEIGHT: 62 IN | OXYGEN SATURATION: 98 % | HEART RATE: 69 BPM | BODY MASS INDEX: 25.95 KG/M2

## 2025-09-05 DIAGNOSIS — I25.10 CORONARY ARTERY DISEASE DUE TO LIPID RICH PLAQUE: ICD-10-CM

## 2025-09-05 DIAGNOSIS — E78.00 PURE HYPERCHOLESTEROLEMIA: ICD-10-CM

## 2025-09-05 DIAGNOSIS — I25.83 CORONARY ARTERY DISEASE DUE TO LIPID RICH PLAQUE: ICD-10-CM

## 2025-09-05 DIAGNOSIS — I10 ESSENTIAL HYPERTENSION WITH GOAL BLOOD PRESSURE LESS THAN 140/90: Primary | ICD-10-CM

## 2025-09-05 PROCEDURE — 3078F DIAST BP <80 MM HG: CPT | Performed by: INTERNAL MEDICINE

## 2025-09-05 PROCEDURE — 3017F COLORECTAL CA SCREEN DOC REV: CPT | Performed by: INTERNAL MEDICINE

## 2025-09-05 PROCEDURE — G8419 CALC BMI OUT NRM PARAM NOF/U: HCPCS | Performed by: INTERNAL MEDICINE

## 2025-09-05 PROCEDURE — G8428 CUR MEDS NOT DOCUMENT: HCPCS | Performed by: INTERNAL MEDICINE

## 2025-09-05 PROCEDURE — 3074F SYST BP LT 130 MM HG: CPT | Performed by: INTERNAL MEDICINE

## 2025-09-05 PROCEDURE — 1126F AMNT PAIN NOTED NONE PRSNT: CPT | Performed by: INTERNAL MEDICINE

## 2025-09-05 PROCEDURE — 1090F PRES/ABSN URINE INCON ASSESS: CPT | Performed by: INTERNAL MEDICINE

## 2025-09-05 PROCEDURE — 1036F TOBACCO NON-USER: CPT | Performed by: INTERNAL MEDICINE

## 2025-09-05 PROCEDURE — 1123F ACP DISCUSS/DSCN MKR DOCD: CPT | Performed by: INTERNAL MEDICINE

## 2025-09-05 PROCEDURE — G8400 PT W/DXA NO RESULTS DOC: HCPCS | Performed by: INTERNAL MEDICINE

## 2025-09-05 PROCEDURE — 99214 OFFICE O/P EST MOD 30 MIN: CPT | Performed by: INTERNAL MEDICINE

## (undated) DEVICE — SUT SLK 2 60IN TIE MP BLK --

## (undated) DEVICE — PROCEDURE KIT FLUID MGMT 10 FR CUST MAINFOLD

## (undated) DEVICE — 450 ML BOTTLE OF 0.05% CHLORHEXIDINE GLUCONATE IN 99.95% STERILE WATER FOR IRRIGATION, USP AND APPLICATOR.: Brand: IRRISEPT ANTIMICROBIAL WOUND LAVAGE

## (undated) DEVICE — PROCEDURE KIT FLUID MGMT CUST MAINFOLD STRL

## (undated) DEVICE — SUTURE VCRL SZ 2-0 L27IN ABSRB UD L26MM CT-2 1/2 CIR J269H

## (undated) DEVICE — STERILE POLYISOPRENE POWDER-FREE SURGICAL GLOVES: Brand: PROTEXIS

## (undated) DEVICE — GAUZE,SPONGE,8"X4",12PLY,XRAY,STRL,LF: Brand: MEDLINE

## (undated) DEVICE — SYS VSL HARV HEMOPRO2 VASOVIEW -- HARV SYS MINIMUM ORDER 5/EA

## (undated) DEVICE — EZ GLIDE AORTIC CANNULA: Brand: EDWARDS LIFESCIENCES EZ GLIDE AORTIC CANNULA

## (undated) DEVICE — SUTURE MCRYL SZ 4 0 L18IN ABSRB VLT PS 1 L24MM 3 8 CIR REV Y682H

## (undated) DEVICE — CATHETER THOR 28FR L23CM DIA9.3MM POLYVI CHL TAPR CONN TIP

## (undated) DEVICE — PENCIL ES L3M BTTN SWCH S STL HEX LOK BLDE ELECTRD HOLSTER

## (undated) DEVICE — SURGICAL PROCEDURE KIT LT HRT CUST

## (undated) DEVICE — ANGIO-SEAL VIP VASCULAR CLOSURE DEVICE: Brand: ANGIO-SEAL

## (undated) DEVICE — PRESSURE MONITORING SET: Brand: TRUWAVE

## (undated) DEVICE — GOWN,NON-REINFORCED,3XL: Brand: MEDLINE

## (undated) DEVICE — MEDI-VAC TUBING CONNECTOR 5-IN-1 STRAIGHT: Brand: CARDINAL HEALTH

## (undated) DEVICE — SUT PROL 4-0 36IN RB1 DA BLU --

## (undated) DEVICE — SYR LR LCK 1ML GRAD NSAF 30ML --

## (undated) DEVICE — GOWN,SIRUS,NONRNF,SETINSLV,XL,20/CS: Brand: MEDLINE

## (undated) DEVICE — SUTURE SZ 7 L18IN NONABSORBABLE SIL CCS L48MM 1/2 CIR STRNM M655G

## (undated) DEVICE — SUTURE PROL SZ 7-0 L24IN NONABSORBABLE BLU L8MM BV175-6 3/8 8735H

## (undated) DEVICE — STANDARD SURGICAL GOWN, L: Brand: CONVERTORS

## (undated) DEVICE — ZINACTIVE USE 2641837 CLIP LIG M BLU TI HRT SHP WIRE HORZ 600 PER BX

## (undated) DEVICE — PAD,NON-ADHERENT,3X8,STERILE,LF,1/PK: Brand: MEDLINE

## (undated) DEVICE — GLOVE SURG SZ 7.5 L11.73IN FNGR THK9.8MIL STRW LTX POLYMER

## (undated) DEVICE — SUT PROL 6-0 30IN C1 DA BLU --

## (undated) DEVICE — BAND COMPR L21CM SHT CLR PLAS HEMSTAT EXT HK AND LOOP RETEN

## (undated) DEVICE — LINER,SOFT,SUCTION CANISTER,1500CC: Brand: MEDLINE

## (undated) DEVICE — INTRODUCER SHTH 5FR CANN L11CM DIL TIP 25MM GRY TUNGSTEN

## (undated) DEVICE — SPONGE DRAIN NONWOVEN 4X4IN -- 2/PK

## (undated) DEVICE — TOWEL SURG W16XL26IN WHT NONFENESTRATED ST 4 PER PK

## (undated) DEVICE — SUTURE MCRYL SZ 4-0 L18IN ABSRB UD L19MM PS-2 3/8 CIR PRIM Y496G

## (undated) DEVICE — SUT SLK 0 30IN FSL BLK --

## (undated) DEVICE — FOGARTY SPRING CLIPS 6MM: Brand: FOGARTY SOFTJAW

## (undated) DEVICE — GAUZE,SPONGE,4"X4",16PLY,STRL,LF,10/TRAY: Brand: MEDLINE

## (undated) DEVICE — SET CATH 20GA L1.75IN RAD ART POLYUR RADPQ W/ INTEGR

## (undated) DEVICE — NDL PRT INJ NSAF BLNT 18GX1.5 --

## (undated) DEVICE — SUTURE PERMAHAND SZ 2-0 L18IN NONABSORBABLE BLK L26MM SH C012D

## (undated) DEVICE — DRAPE,ANGIO,BRACH,STERILE,38X44: Brand: MEDLINE

## (undated) DEVICE — INSERT SUT HLD F/OCTBS RETRCTR --

## (undated) DEVICE — CATHETER DIAG AD 5FR L100CM COR GRY HYDRPHLC NYL IM W/O

## (undated) DEVICE — SUT PROL 4-0 30IN SH1 DA BLU --

## (undated) DEVICE — HEART II: Brand: MEDLINE INDUSTRIES, INC.

## (undated) DEVICE — CATHETER ART 20 GAX4 CM 22 GA RADIAL FEP

## (undated) DEVICE — DR LANCEY ON PUMP PACK: Brand: MEDLINE INDUSTRIES, INC.

## (undated) DEVICE — WAX SURG 2.5GM HEMSTAT BNE BEESWAX PARAFFIN ISO PALMITATE

## (undated) DEVICE — PACK PROCEDURE SURG VASC CATH 161 MMC LF

## (undated) DEVICE — TRAY CATHETER 16FR W URIN M STATLOK STBL DEV FOR LUBRI-SIL 2 TEMP

## (undated) DEVICE — GDWIRE ANGIO DUO FLX 0.018X25 --

## (undated) DEVICE — STERNUM BLADE, OFFSET (31.7 X 0.64 X 6.3MM)

## (undated) DEVICE — CATHETER ANGIO 5FR L100CM GRY S STL NYL JR4 3 SEG BRAID L

## (undated) DEVICE — BRUSH SCRB 4% CHG RED DISP --

## (undated) DEVICE — SUTURE ETHBND EXCEL SZ 2-0 L36IN NONABSORBABLE GRN SH-1 X763H

## (undated) DEVICE — GUIDEWIRE VASC L260CM DIA0035IN TIP L3MM PTFE J STD TAPR FIX

## (undated) DEVICE — SET FLD ADMIN 3 W STPCOCK FIX FEM L BOR 1IN

## (undated) DEVICE — APPLIER CLP L L13IN TI MULT RNG HNDL 20 CLP STR LIGACLP

## (undated) DEVICE — X-RAY SPONGES,12 PLY: Brand: DERMACEA

## (undated) DEVICE — Device

## (undated) DEVICE — PROBE VASC 8MHZ WTRPRF

## (undated) DEVICE — CATHETER ANGIO JL4 0.045 INX5 FRX100 CM THRULUMEN EXPO

## (undated) DEVICE — PREP SKN CHLRAPRP APL 26ML STR --

## (undated) DEVICE — PAD,ABDOMINAL,8"X10",ST,LF: Brand: MEDLINE

## (undated) DEVICE — ADAPTER CARDPLG SET MGMT FEM LUER W/ CLR CODE CLMP DLP

## (undated) DEVICE — SUT SLK 0 30IN SH BLK --

## (undated) DEVICE — DECANTER BAG 9": Brand: MEDLINE INDUSTRIES, INC.

## (undated) DEVICE — SUT SLK 1 30IN TIE MP BLK --

## (undated) DEVICE — SUTURE PROL SZ 7-0 L30IN NONABSORBABLE BLU L9.3MM BV-1 1/2 8703H

## (undated) DEVICE — SWAN-GANZ CCOMBO V THERMODILUTION CATHETER: Brand: SWAN-GANZ CCOMBO V

## (undated) DEVICE — REM POLYHESIVE ADULT PATIENT RETURN ELECTRODE: Brand: VALLEYLAB

## (undated) DEVICE — SOLUTION IV 1000ML 0.9% SOD CHL

## (undated) DEVICE — KIT CATH 7FR L16CM CTRL VEN POLYUR 3 LUMN PRSS INJ BLU

## (undated) DEVICE — SUTURE VCRL SZ 0 L36IN ABSRB UD L36MM CT-1 1/2 CIR J946H

## (undated) DEVICE — SPONGE HEMOSTAT CELLULS 4X8IN -- SURGICEL

## (undated) DEVICE — NEEDLE HYPO 25GA L0.625IN BLU POLYPR HUB S STL REG BVL STR

## (undated) DEVICE — Y BARBED CONNECTOR POLYPROPYLENE, LARGE: Brand: ARGYLE

## (undated) DEVICE — AVID DUAL STAGE VENOUS DRAINAGE CANNULA: Brand: AVID DUAL STAGE VENOUS DRAINAGE CANNULA

## (undated) DEVICE — GLIDESHEATH SLENDER STAINLESS STEEL KIT: Brand: GLIDESHEATH SLENDER

## (undated) DEVICE — SET ANGIO L6.5IN L BOR 3 W STPCOCK SPIK TBNG

## (undated) DEVICE — RADIFOCUS OPTITORQUE ANGIOGRAPHIC CATHETER: Brand: OPTITORQUE

## (undated) DEVICE — ARGYLE FRAZIER SURGICAL SUCTION INSTRUMENT 8 FR/CH (2.7 MM): Brand: ARGYLE

## (undated) DEVICE — ROTATING SURGICAL PUNCHES, 1 PER POUCH: Brand: A&E MEDICAL / ROTATING SURGICAL PUNCHES

## (undated) DEVICE — BARD® PTFE FELT PLEDGETS, (RECTANGLE), 4.8 MM X 6 MM: Brand: BARD® PTFE FELT PLEDGETS

## (undated) DEVICE — DRAIN SURG 24FR L5/16IN DIA8MM SIL RND HUBLESS FULL FLUT

## (undated) DEVICE — MEDI-TRACE CADENCE ADULT, DEFIBRILLATION ELECTRODE -RTS  (10 PR/PK) - PHYSIO-CONTROL: Brand: MEDI-TRACE CADENCE